# Patient Record
Sex: FEMALE | Race: WHITE | NOT HISPANIC OR LATINO | Employment: OTHER | URBAN - METROPOLITAN AREA
[De-identification: names, ages, dates, MRNs, and addresses within clinical notes are randomized per-mention and may not be internally consistent; named-entity substitution may affect disease eponyms.]

---

## 2017-01-03 ENCOUNTER — HISTORICAL (OUTPATIENT)
Dept: SURGERY | Facility: HOSPITAL | Age: 72
End: 2017-01-03

## 2017-01-06 ENCOUNTER — HISTORICAL (OUTPATIENT)
Dept: ANESTHESIOLOGY | Facility: HOSPITAL | Age: 72
End: 2017-01-06

## 2017-01-06 ENCOUNTER — HISTORICAL (OUTPATIENT)
Dept: ADMINISTRATIVE | Facility: HOSPITAL | Age: 72
End: 2017-01-06

## 2017-05-12 ENCOUNTER — HISTORICAL (OUTPATIENT)
Dept: INFUSION THERAPY | Facility: HOSPITAL | Age: 72
End: 2017-05-12

## 2017-05-12 LAB
ABS NEUT (OLG): 5.1 X10(3)/MCL (ref 1.5–6.9)
ALBUMIN SERPL-MCNC: 3.4 GM/DL (ref 3.4–5)
ALBUMIN/GLOB SERPL: 0.9 RATIO
ALP SERPL-CCNC: 137 UNIT/L (ref 30–113)
ALT SERPL-CCNC: 19 UNIT/L (ref 10–45)
AST SERPL-CCNC: 12 UNIT/L (ref 15–37)
BASOPHILS # BLD AUTO: 0.1 X10(3)/MCL (ref 0–0.1)
BASOPHILS NFR BLD AUTO: 1 % (ref 0–1)
BILIRUB SERPL-MCNC: 0.3 MG/DL (ref 0.1–0.9)
BILIRUBIN DIRECT+TOT PNL SERPL-MCNC: 0.1 MG/DL (ref 0–0.3)
BILIRUBIN DIRECT+TOT PNL SERPL-MCNC: 0.2 MG/DL
BUN SERPL-MCNC: 24 MG/DL (ref 10–20)
CALCIUM SERPL-MCNC: 8.6 MG/DL (ref 8–10.5)
CHLORIDE SERPL-SCNC: 103 MMOL/L (ref 100–108)
CO2 SERPL-SCNC: 27 MMOL/L (ref 21–35)
CREAT SERPL-MCNC: 1.65 MG/DL (ref 0.7–1.3)
EOSINOPHIL # BLD AUTO: 0.4 X10(3)/MCL (ref 0–0.6)
EOSINOPHIL NFR BLD AUTO: 5 % (ref 0–5)
ERYTHROCYTE [DISTWIDTH] IN BLOOD BY AUTOMATED COUNT: 15.1 % (ref 11.5–17)
GLOBULIN SER-MCNC: 3.7 GM/DL
GLUCOSE SERPL-MCNC: 144 MG/DL (ref 75–116)
HCT VFR BLD AUTO: 34 % (ref 36–48)
HGB BLD-MCNC: 10.7 GM/DL (ref 12–16)
LYMPHOCYTES # BLD AUTO: 2 X10(3)/MCL (ref 0.5–4.1)
LYMPHOCYTES NFR BLD AUTO: 24.8 % (ref 15–40)
MCH RBC QN AUTO: 28 PG (ref 27–34)
MCHC RBC AUTO-ENTMCNC: 32 GM/DL (ref 31–36)
MCV RBC AUTO: 89 FL (ref 80–99)
MONOCYTES # BLD AUTO: 0.5 X10(3)/MCL (ref 0–1.1)
MONOCYTES NFR BLD AUTO: 7 % (ref 4–12)
NEUTROPHILS # BLD AUTO: 5.1 X10(3)/MCL (ref 1.5–6.9)
NEUTROPHILS NFR BLD AUTO: 63 % (ref 43–75)
PLATELET # BLD AUTO: 260 X10(3)/MCL (ref 140–400)
PMV BLD AUTO: 10.3 FL (ref 6.8–10)
POTASSIUM SERPL-SCNC: 3.6 MMOL/L (ref 3.6–5.2)
PROT SERPL-MCNC: 7.1 GM/DL (ref 6.4–8.2)
RBC # BLD AUTO: 3.82 X10(6)/MCL (ref 4.2–5.4)
SODIUM SERPL-SCNC: 140 MMOL/L (ref 135–145)
WBC # SPEC AUTO: 8.2 X10(3)/MCL (ref 4.5–11.5)

## 2017-05-30 ENCOUNTER — HISTORICAL (OUTPATIENT)
Dept: RADIOLOGY | Facility: HOSPITAL | Age: 72
End: 2017-05-30

## 2017-06-13 ENCOUNTER — HISTORICAL (OUTPATIENT)
Dept: NEUROSURGERY | Facility: CLINIC | Age: 72
End: 2017-06-13

## 2017-06-13 ENCOUNTER — HISTORICAL (OUTPATIENT)
Dept: ADMINISTRATIVE | Facility: HOSPITAL | Age: 72
End: 2017-06-13

## 2017-06-13 LAB
ABS NEUT (OLG): 4.09 X10(3)/MCL (ref 2.1–9.2)
APTT PPP: 31.7 SECOND(S) (ref 20.6–36)
BASOPHILS # BLD AUTO: 0.1 X10(3)/MCL (ref 0–0.2)
BASOPHILS NFR BLD AUTO: 1 %
BUN SERPL-MCNC: 18 MG/DL (ref 7–18)
CALCIUM SERPL-MCNC: 8.8 MG/DL (ref 8.5–10.1)
CHLORIDE SERPL-SCNC: 107 MMOL/L (ref 98–107)
CO2 SERPL-SCNC: 26 MMOL/L (ref 21–32)
CREAT SERPL-MCNC: 1.48 MG/DL (ref 0.55–1.02)
EOSINOPHIL # BLD AUTO: 0.3 X10(3)/MCL (ref 0–0.9)
EOSINOPHIL NFR BLD AUTO: 4 %
ERYTHROCYTE [DISTWIDTH] IN BLOOD BY AUTOMATED COUNT: 15.8 % (ref 11.5–17)
GLUCOSE SERPL-MCNC: 107 MG/DL (ref 74–106)
HCT VFR BLD AUTO: 33.8 % (ref 37–47)
HGB BLD-MCNC: 10.4 GM/DL (ref 12–16)
INR PPP: 1.14 (ref 0–1.27)
LYMPHOCYTES # BLD AUTO: 1.5 X10(3)/MCL (ref 0.6–4.6)
LYMPHOCYTES NFR BLD AUTO: 23 %
MCH RBC QN AUTO: 27.8 PG (ref 27–31)
MCHC RBC AUTO-ENTMCNC: 30.8 GM/DL (ref 33–36)
MCV RBC AUTO: 90.4 FL (ref 80–94)
MONOCYTES # BLD AUTO: 0.4 X10(3)/MCL (ref 0.1–1.3)
MONOCYTES NFR BLD AUTO: 7 %
NEUTROPHILS # BLD AUTO: 4.09 X10(3)/MCL (ref 2.1–9.2)
NEUTROPHILS NFR BLD AUTO: 64 %
PLATELET # BLD AUTO: 226 X10(3)/MCL (ref 130–400)
PMV BLD AUTO: 10.5 FL (ref 9.4–12.4)
POTASSIUM SERPL-SCNC: 3.9 MMOL/L (ref 3.5–5.1)
PROTHROMBIN TIME: 14.4 SECOND(S) (ref 12.1–14.2)
RBC # BLD AUTO: 3.74 X10(6)/MCL (ref 4.2–5.4)
SODIUM SERPL-SCNC: 140 MMOL/L (ref 136–145)
WBC # SPEC AUTO: 6.4 X10(3)/MCL (ref 4.5–11.5)

## 2017-07-14 ENCOUNTER — HISTORICAL (OUTPATIENT)
Dept: INFUSION THERAPY | Facility: HOSPITAL | Age: 72
End: 2017-07-14

## 2017-08-08 ENCOUNTER — HISTORICAL (OUTPATIENT)
Dept: NEUROSURGERY | Facility: CLINIC | Age: 72
End: 2017-08-08

## 2017-08-08 ENCOUNTER — HISTORICAL (OUTPATIENT)
Dept: ADMINISTRATIVE | Facility: HOSPITAL | Age: 72
End: 2017-08-08

## 2017-08-08 LAB
ABS NEUT (OLG): 4.13 X10(3)/MCL (ref 2.1–9.2)
APTT PPP: 32.4 SECOND(S) (ref 20.6–36)
BASOPHILS # BLD AUTO: 0.1 X10(3)/MCL (ref 0–0.2)
BASOPHILS NFR BLD AUTO: 1 %
BUN SERPL-MCNC: 29 MG/DL (ref 7–18)
CALCIUM SERPL-MCNC: 8.7 MG/DL (ref 8.5–10.1)
CHLORIDE SERPL-SCNC: 107 MMOL/L (ref 98–107)
CO2 SERPL-SCNC: 29 MMOL/L (ref 21–32)
CREAT SERPL-MCNC: 1.63 MG/DL (ref 0.55–1.02)
EOSINOPHIL # BLD AUTO: 0.2 X10(3)/MCL (ref 0–0.9)
EOSINOPHIL NFR BLD AUTO: 3 %
ERYTHROCYTE [DISTWIDTH] IN BLOOD BY AUTOMATED COUNT: 15.5 % (ref 11.5–17)
GLUCOSE SERPL-MCNC: 108 MG/DL (ref 74–106)
HCT VFR BLD AUTO: 33.7 % (ref 37–47)
HGB BLD-MCNC: 10.4 GM/DL (ref 12–16)
INR PPP: 1.11 (ref 0–1.27)
LYMPHOCYTES # BLD AUTO: 2 X10(3)/MCL (ref 0.6–4.6)
LYMPHOCYTES NFR BLD AUTO: 28 %
MCH RBC QN AUTO: 27.6 PG (ref 27–31)
MCHC RBC AUTO-ENTMCNC: 30.9 GM/DL (ref 33–36)
MCV RBC AUTO: 89.4 FL (ref 80–94)
MONOCYTES # BLD AUTO: 0.5 X10(3)/MCL (ref 0.1–1.3)
MONOCYTES NFR BLD AUTO: 7 %
NEUTROPHILS # BLD AUTO: 4.13 X10(3)/MCL (ref 2.1–9.2)
NEUTROPHILS NFR BLD AUTO: 60 %
PLATELET # BLD AUTO: 233 X10(3)/MCL (ref 130–400)
PMV BLD AUTO: 9.9 FL (ref 9.4–12.4)
POTASSIUM SERPL-SCNC: 4.8 MMOL/L (ref 3.5–5.1)
PROTHROMBIN TIME: 14.1 SECOND(S) (ref 12.1–14.2)
RBC # BLD AUTO: 3.77 X10(6)/MCL (ref 4.2–5.4)
SODIUM SERPL-SCNC: 143 MMOL/L (ref 136–145)
WBC # SPEC AUTO: 6.8 X10(3)/MCL (ref 4.5–11.5)

## 2017-08-23 ENCOUNTER — HISTORICAL (OUTPATIENT)
Dept: SURGERY | Facility: HOSPITAL | Age: 72
End: 2017-08-23

## 2017-08-23 LAB
APTT PPP: 24.4 SECOND(S) (ref 25–35)
INR PPP: 1 (ref 0–1.2)
PROTHROMBIN TIME: 10.7 SECOND(S) (ref 9–12)

## 2017-08-25 ENCOUNTER — HISTORICAL (OUTPATIENT)
Dept: ANESTHESIOLOGY | Facility: HOSPITAL | Age: 72
End: 2017-08-25

## 2017-09-28 ENCOUNTER — HISTORICAL (OUTPATIENT)
Dept: HEMATOLOGY/ONCOLOGY | Facility: CLINIC | Age: 72
End: 2017-09-28

## 2017-09-28 ENCOUNTER — HISTORICAL (OUTPATIENT)
Dept: INFUSION THERAPY | Facility: HOSPITAL | Age: 72
End: 2017-09-28

## 2017-09-28 LAB
ABS NEUT (OLG): 6.5 X10(3)/MCL (ref 1.5–6.9)
ALBUMIN SERPL-MCNC: 3.5 GM/DL (ref 3.4–5)
ALBUMIN/GLOB SERPL: 0.9 RATIO
ALP SERPL-CCNC: 128 UNIT/L (ref 30–113)
ALT SERPL-CCNC: 20 UNIT/L (ref 10–45)
AST SERPL-CCNC: 12 UNIT/L (ref 15–37)
BASOPHILS # BLD AUTO: 0.1 X10(3)/MCL (ref 0–0.1)
BASOPHILS NFR BLD AUTO: 1 % (ref 0–1)
BILIRUB SERPL-MCNC: 0.3 MG/DL (ref 0.1–0.9)
BILIRUBIN DIRECT+TOT PNL SERPL-MCNC: 0.1 MG/DL (ref 0–0.3)
BILIRUBIN DIRECT+TOT PNL SERPL-MCNC: 0.2 MG/DL
BUN SERPL-MCNC: 21 MG/DL (ref 10–20)
CALCIUM SERPL-MCNC: 8.4 MG/DL (ref 8–10.5)
CHLORIDE SERPL-SCNC: 104 MMOL/L (ref 100–108)
CO2 SERPL-SCNC: 29 MMOL/L (ref 21–35)
CREAT SERPL-MCNC: 1.75 MG/DL (ref 0.7–1.3)
EOSINOPHIL # BLD AUTO: 0.3 X10(3)/MCL (ref 0–0.6)
EOSINOPHIL NFR BLD AUTO: 3 % (ref 0–5)
ERYTHROCYTE [DISTWIDTH] IN BLOOD BY AUTOMATED COUNT: 14.6 % (ref 11.5–17)
GLOBULIN SER-MCNC: 3.7 GM/DL
GLUCOSE SERPL-MCNC: 106 MG/DL (ref 75–116)
HCT VFR BLD AUTO: 35.2 % (ref 36–48)
HGB BLD-MCNC: 10.9 GM/DL (ref 12–16)
LDH SERPL-CCNC: 231 UNIT/L (ref 112–240)
LYMPHOCYTES # BLD AUTO: 2.2 X10(3)/MCL (ref 0.5–4.1)
LYMPHOCYTES NFR BLD AUTO: 22.4 % (ref 15–40)
MCH RBC QN AUTO: 28 PG (ref 27–34)
MCHC RBC AUTO-ENTMCNC: 31 GM/DL (ref 31–36)
MCV RBC AUTO: 90 FL (ref 80–99)
MONOCYTES # BLD AUTO: 0.8 X10(3)/MCL (ref 0–1.1)
MONOCYTES NFR BLD AUTO: 8 % (ref 4–12)
NEUTROPHILS # BLD AUTO: 6.5 X10(3)/MCL (ref 1.5–6.9)
NEUTROPHILS NFR BLD AUTO: 66 % (ref 43–75)
PLATELET # BLD AUTO: 292 X10(3)/MCL (ref 140–400)
PMV BLD AUTO: 10 FL (ref 6.8–10)
POTASSIUM SERPL-SCNC: 4 MMOL/L (ref 3.6–5.2)
PROT SERPL-MCNC: 7.2 GM/DL (ref 6.4–8.2)
RBC # BLD AUTO: 3.89 X10(6)/MCL (ref 4.2–5.4)
SODIUM SERPL-SCNC: 141 MMOL/L (ref 135–145)
WBC # SPEC AUTO: 9.8 X10(3)/MCL (ref 4.5–11.5)

## 2017-12-07 ENCOUNTER — HISTORICAL (OUTPATIENT)
Dept: NEUROSURGERY | Facility: CLINIC | Age: 72
End: 2017-12-07

## 2017-12-07 ENCOUNTER — HISTORICAL (OUTPATIENT)
Dept: ADMINISTRATIVE | Facility: HOSPITAL | Age: 72
End: 2017-12-07

## 2017-12-07 LAB
ABS NEUT (OLG): 3.94 X10(3)/MCL (ref 2.1–9.2)
APTT PPP: 30.9 SECOND(S) (ref 24.8–36.9)
BASOPHILS # BLD AUTO: 0.1 X10(3)/MCL (ref 0–0.2)
BASOPHILS NFR BLD AUTO: 1 %
BUN SERPL-MCNC: 23 MG/DL (ref 7–18)
CALCIUM SERPL-MCNC: 8.8 MG/DL (ref 8.5–10.1)
CHLORIDE SERPL-SCNC: 106 MMOL/L (ref 98–107)
CO2 SERPL-SCNC: 23 MMOL/L (ref 21–32)
CREAT SERPL-MCNC: 1.62 MG/DL (ref 0.55–1.02)
EOSINOPHIL # BLD AUTO: 0.2 X10(3)/MCL (ref 0–0.9)
EOSINOPHIL NFR BLD AUTO: 4 %
ERYTHROCYTE [DISTWIDTH] IN BLOOD BY AUTOMATED COUNT: 14.6 % (ref 11.5–17)
GLUCOSE SERPL-MCNC: 88 MG/DL (ref 74–106)
HCT VFR BLD AUTO: 34.9 % (ref 37–47)
HGB BLD-MCNC: 10.5 GM/DL (ref 12–16)
INR PPP: 1.09 (ref 0–1.27)
LYMPHOCYTES # BLD AUTO: 1.6 X10(3)/MCL (ref 0.6–4.6)
LYMPHOCYTES NFR BLD AUTO: 25 %
MCH RBC QN AUTO: 26.7 PG (ref 27–31)
MCHC RBC AUTO-ENTMCNC: 30.1 GM/DL (ref 33–36)
MCV RBC AUTO: 88.8 FL (ref 80–94)
MONOCYTES # BLD AUTO: 0.5 X10(3)/MCL (ref 0.1–1.3)
MONOCYTES NFR BLD AUTO: 7 %
NEUTROPHILS # BLD AUTO: 3.94 X10(3)/MCL (ref 1.4–7.9)
NEUTROPHILS NFR BLD AUTO: 62 %
PLATELET # BLD AUTO: 237 X10(3)/MCL (ref 130–400)
PMV BLD AUTO: 10.5 FL (ref 9.4–12.4)
POTASSIUM SERPL-SCNC: 3.8 MMOL/L (ref 3.5–5.1)
PROTHROMBIN TIME: 14.4 SECOND(S) (ref 12.2–14.7)
RBC # BLD AUTO: 3.93 X10(6)/MCL (ref 4.2–5.4)
SODIUM SERPL-SCNC: 142 MMOL/L (ref 136–145)
WBC # SPEC AUTO: 6.3 X10(3)/MCL (ref 4.5–11.5)

## 2018-05-17 ENCOUNTER — HISTORICAL (OUTPATIENT)
Dept: LAB | Facility: HOSPITAL | Age: 73
End: 2018-05-17

## 2018-05-17 LAB
ABS NEUT (OLG): 4.7 X10(3)/MCL (ref 1.5–6.9)
ALBUMIN SERPL-MCNC: 3.3 GM/DL (ref 3.4–5)
ALBUMIN/GLOB SERPL: 0.9 RATIO
ALP SERPL-CCNC: 140 UNIT/L (ref 30–113)
ALT SERPL-CCNC: 26 UNIT/L (ref 10–45)
AST SERPL-CCNC: 17 UNIT/L (ref 15–37)
BASOPHILS # BLD AUTO: 0.1 X10(3)/MCL (ref 0–0.1)
BASOPHILS NFR BLD AUTO: 1 % (ref 0–1)
BILIRUB SERPL-MCNC: 0.3 MG/DL (ref 0.1–0.9)
BILIRUBIN DIRECT+TOT PNL SERPL-MCNC: 0.1 MG/DL (ref 0–0.3)
BILIRUBIN DIRECT+TOT PNL SERPL-MCNC: 0.2 MG/DL
BUN SERPL-MCNC: 24 MG/DL (ref 10–20)
CALCIUM SERPL-MCNC: 8.8 MG/DL (ref 8–10.5)
CHLORIDE SERPL-SCNC: 107 MMOL/L (ref 100–108)
CO2 SERPL-SCNC: 28 MMOL/L (ref 21–35)
CREAT SERPL-MCNC: 1.54 MG/DL (ref 0.7–1.3)
EOSINOPHIL # BLD AUTO: 0.3 X10(3)/MCL (ref 0–0.6)
EOSINOPHIL NFR BLD AUTO: 5 % (ref 0–5)
ERYTHROCYTE [DISTWIDTH] IN BLOOD BY AUTOMATED COUNT: 15.6 % (ref 11.5–17)
GLOBULIN SER-MCNC: 3.5 GM/DL
GLUCOSE SERPL-MCNC: 109 MG/DL (ref 75–116)
HCT VFR BLD AUTO: 33.8 % (ref 36–48)
HGB BLD-MCNC: 10.3 GM/DL (ref 12–16)
LDH SERPL-CCNC: 197 UNIT/L (ref 112–240)
LYMPHOCYTES # BLD AUTO: 1.6 X10(3)/MCL (ref 0.5–4.1)
LYMPHOCYTES NFR BLD AUTO: 21.9 % (ref 15–40)
MCH RBC QN AUTO: 27 PG (ref 27–34)
MCHC RBC AUTO-ENTMCNC: 30 GM/DL (ref 31–36)
MCV RBC AUTO: 89 FL (ref 80–99)
MONOCYTES # BLD AUTO: 0.4 X10(3)/MCL (ref 0–1.1)
MONOCYTES NFR BLD AUTO: 6 % (ref 4–12)
NEUTROPHILS # BLD AUTO: 4.7 X10(3)/MCL (ref 1.5–6.9)
NEUTROPHILS NFR BLD AUTO: 66 % (ref 43–75)
PLATELET # BLD AUTO: 273 X10(3)/MCL (ref 140–400)
PMV BLD AUTO: 10.3 FL (ref 6.8–10)
POTASSIUM SERPL-SCNC: 4 MMOL/L (ref 3.6–5.2)
PROT SERPL-MCNC: 6.8 GM/DL (ref 6.4–8.2)
RBC # BLD AUTO: 3.81 X10(6)/MCL (ref 4.2–5.4)
SODIUM SERPL-SCNC: 143 MMOL/L (ref 135–145)
WBC # SPEC AUTO: 7.1 X10(3)/MCL (ref 4.5–11.5)

## 2018-06-04 ENCOUNTER — HISTORICAL (OUTPATIENT)
Dept: SURGERY | Facility: HOSPITAL | Age: 73
End: 2018-06-04

## 2018-06-04 LAB
APTT PPP: 25.7 SECOND(S) (ref 25–35)
INR PPP: 1 (ref 0–1.2)
PROTHROMBIN TIME: 10.7 SECOND(S) (ref 9–12)

## 2018-06-08 ENCOUNTER — HISTORICAL (OUTPATIENT)
Dept: ANESTHESIOLOGY | Facility: HOSPITAL | Age: 73
End: 2018-06-08

## 2018-06-08 LAB — CRC RECOMMENDATION EXT: NORMAL

## 2018-06-18 ENCOUNTER — HISTORICAL (OUTPATIENT)
Dept: RADIOLOGY | Facility: HOSPITAL | Age: 73
End: 2018-06-18

## 2018-07-11 ENCOUNTER — HISTORICAL (OUTPATIENT)
Dept: CARDIOLOGY | Facility: HOSPITAL | Age: 73
End: 2018-07-11

## 2018-07-11 LAB
ABS NEUT (OLG): 4.01 X10(3)/MCL (ref 2.1–9.2)
APTT PPP: 33.1 SECOND(S) (ref 24.8–36.9)
BASOPHILS # BLD AUTO: 0.1 X10(3)/MCL (ref 0–0.2)
BASOPHILS NFR BLD AUTO: 1 %
EOSINOPHIL # BLD AUTO: 0.3 X10(3)/MCL (ref 0–0.9)
EOSINOPHIL NFR BLD AUTO: 5 %
ERYTHROCYTE [DISTWIDTH] IN BLOOD BY AUTOMATED COUNT: 15.5 % (ref 11.5–17)
HCT VFR BLD AUTO: 32.1 % (ref 37–47)
HGB BLD-MCNC: 9.9 GM/DL (ref 12–16)
INR PPP: 1.09 (ref 0–1.27)
LYMPHOCYTES # BLD AUTO: 1.7 X10(3)/MCL (ref 0.6–4.6)
LYMPHOCYTES NFR BLD AUTO: 26 %
MCH RBC QN AUTO: 27.8 PG (ref 27–31)
MCHC RBC AUTO-ENTMCNC: 30.8 GM/DL (ref 33–36)
MCV RBC AUTO: 90.2 FL (ref 80–94)
MONOCYTES # BLD AUTO: 0.6 X10(3)/MCL (ref 0.1–1.3)
MONOCYTES NFR BLD AUTO: 8 %
NEUTROPHILS # BLD AUTO: 4.01 X10(3)/MCL (ref 1.4–7.9)
NEUTROPHILS NFR BLD AUTO: 60 %
PLATELET # BLD AUTO: 259 X10(3)/MCL (ref 130–400)
PMV BLD AUTO: 10.4 FL (ref 9.4–12.4)
PROTHROMBIN TIME: 14.4 SECOND(S) (ref 12.2–14.7)
RBC # BLD AUTO: 3.56 X10(6)/MCL (ref 4.2–5.4)
WBC # SPEC AUTO: 6.7 X10(3)/MCL (ref 4.5–11.5)

## 2018-09-26 ENCOUNTER — HISTORICAL (OUTPATIENT)
Dept: RESPIRATORY THERAPY | Facility: HOSPITAL | Age: 73
End: 2018-09-26

## 2018-10-22 ENCOUNTER — HISTORICAL (OUTPATIENT)
Dept: LAB | Facility: HOSPITAL | Age: 73
End: 2018-10-22

## 2018-10-22 LAB
ABS NEUT (OLG): 4.1 X10(3)/MCL (ref 1.5–6.9)
ALBUMIN SERPL-MCNC: 3.4 GM/DL (ref 3.4–5)
ALBUMIN/GLOB SERPL: 0.9 RATIO
ALP SERPL-CCNC: 126 UNIT/L (ref 30–113)
ALT SERPL-CCNC: 23 UNIT/L (ref 10–45)
ANISOCYTOSIS BLD QL SMEAR: ABNORMAL
AST SERPL-CCNC: 15 UNIT/L (ref 15–37)
BASOPHILS # BLD AUTO: 0.1 X10(3)/MCL (ref 0–0.1)
BASOPHILS NFR BLD AUTO: 1 % (ref 0–1)
BILIRUB SERPL-MCNC: 0.4 MG/DL (ref 0.1–0.9)
BILIRUBIN DIRECT+TOT PNL SERPL-MCNC: 0.1 MG/DL (ref 0–0.3)
BILIRUBIN DIRECT+TOT PNL SERPL-MCNC: 0.3 MG/DL
BUN SERPL-MCNC: 37 MG/DL (ref 10–20)
CALCIUM SERPL-MCNC: 8.7 MG/DL (ref 8–10.5)
CHLORIDE SERPL-SCNC: 104 MMOL/L (ref 100–108)
CO2 SERPL-SCNC: 32 MMOL/L (ref 21–35)
CREAT SERPL-MCNC: 1.83 MG/DL (ref 0.7–1.3)
CREAT UR-MCNC: 17 MG/DL
EOSINOPHIL # BLD AUTO: 0.3 X10(3)/MCL (ref 0–0.6)
EOSINOPHIL NFR BLD AUTO: 4 % (ref 0–5)
ERYTHROCYTE [DISTWIDTH] IN BLOOD BY AUTOMATED COUNT: 15.8 % (ref 11.5–17)
FERRITIN SERPL-MCNC: 13 NG/ML (ref 3–252)
GLOBULIN SER-MCNC: 3.6 GM/DL
GLUCOSE SERPL-MCNC: 126 MG/DL (ref 75–116)
HCT VFR BLD AUTO: 37.2 % (ref 36–48)
HGB BLD-MCNC: 11.1 GM/DL (ref 12–16)
HYPOCHROMIA BLD QL SMEAR: ABNORMAL
IMM GRANULOCYTES # BLD AUTO: 0.01 10*3/UL (ref 0–0.02)
IMM GRANULOCYTES NFR BLD AUTO: 0.1 % (ref 0–0.43)
IRON SATN MFR SERPL: 12 % (ref 15–55)
IRON SERPL-MCNC: 44 MCG/DL (ref 50–170)
LDH SERPL-CCNC: 198 UNIT/L (ref 112–240)
LYMPHOCYTES # BLD AUTO: 2 X10(3)/MCL (ref 0.5–4.1)
LYMPHOCYTES NFR BLD AUTO: 28 % (ref 15–40)
MCH RBC QN AUTO: 26 PG (ref 27–34)
MCHC RBC AUTO-ENTMCNC: 30 GM/DL (ref 31–36)
MCV RBC AUTO: 89 FL (ref 80–99)
MONOCYTES # BLD AUTO: 0.5 X10(3)/MCL (ref 0–1.1)
MONOCYTES NFR BLD AUTO: 8 % (ref 4–12)
NEUTROPHILS # BLD AUTO: 4.1 X10(3)/MCL (ref 1.5–6.9)
NEUTROPHILS NFR BLD AUTO: 59 % (ref 43–75)
PHOSPHATE SERPL-MCNC: 4.1 MG/DL (ref 2.6–4.7)
PLATELET # BLD AUTO: 312 X10(3)/MCL (ref 140–400)
PLATELET # BLD EST: ADEQUATE 10*3/UL
PMV BLD AUTO: 10.2 FL (ref 6.8–10)
POTASSIUM SERPL-SCNC: 4.3 MMOL/L (ref 3.6–5.2)
PROT SERPL-MCNC: 7 GM/DL (ref 6.4–8.2)
PROT UR STRIP-MCNC: 10 MG/DL (ref 0–10)
PTH-INTACT SERPL-MCNC: 243.4 PG/ML (ref 18.4–80.1)
RBC # BLD AUTO: 4.2 X10(6)/MCL (ref 4.2–5.4)
RBC MORPH BLD: ABNORMAL
SODIUM SERPL-SCNC: 143 MMOL/L (ref 135–145)
TIBC SERPL-MCNC: 330 MCG/DL (ref 150–375)
TIBC SERPL-MCNC: 374 MCG/DL (ref 250–450)
WBC # SPEC AUTO: 7 X10(3)/MCL (ref 4.5–11.5)

## 2018-11-30 ENCOUNTER — HISTORICAL (OUTPATIENT)
Dept: RADIOLOGY | Facility: HOSPITAL | Age: 73
End: 2018-11-30

## 2018-12-07 ENCOUNTER — HISTORICAL (OUTPATIENT)
Dept: RADIOLOGY | Facility: HOSPITAL | Age: 73
End: 2018-12-07

## 2019-04-23 ENCOUNTER — HISTORICAL (OUTPATIENT)
Dept: LAB | Facility: HOSPITAL | Age: 74
End: 2019-04-23

## 2019-04-23 LAB
ABS NEUT (OLG): 5.8 X10(3)/MCL (ref 1.5–6.9)
ALBUMIN SERPL-MCNC: 3.7 GM/DL (ref 3.4–5)
ALBUMIN/GLOB SERPL: 1 RATIO
ALP SERPL-CCNC: 124 UNIT/L (ref 30–113)
ALT SERPL-CCNC: 21 UNIT/L (ref 10–45)
AST SERPL-CCNC: 16 UNIT/L (ref 15–37)
BASOPHILS # BLD AUTO: 0.1 X10(3)/MCL (ref 0–0.1)
BASOPHILS NFR BLD AUTO: 1 % (ref 0–1)
BILIRUB SERPL-MCNC: 0.3 MG/DL (ref 0.1–0.9)
BILIRUBIN DIRECT+TOT PNL SERPL-MCNC: 0.1 MG/DL (ref 0–0.3)
BILIRUBIN DIRECT+TOT PNL SERPL-MCNC: 0.2 MG/DL
BUN SERPL-MCNC: 60 MG/DL (ref 10–20)
CALCIUM SERPL-MCNC: 9.2 MG/DL (ref 8–10.5)
CHLORIDE SERPL-SCNC: 100 MMOL/L (ref 100–108)
CO2 SERPL-SCNC: 33 MMOL/L (ref 21–35)
CREAT SERPL-MCNC: 1.86 MG/DL (ref 0.7–1.3)
EOSINOPHIL # BLD AUTO: 0.6 X10(3)/MCL (ref 0–0.6)
EOSINOPHIL NFR BLD AUTO: 6 % (ref 0–5)
ERYTHROCYTE [DISTWIDTH] IN BLOOD BY AUTOMATED COUNT: 15.9 % (ref 11.5–17)
GLOBULIN SER-MCNC: 3.8 GM/DL
GLUCOSE SERPL-MCNC: 110 MG/DL (ref 75–116)
HCT VFR BLD AUTO: 38.8 % (ref 36–48)
HGB BLD-MCNC: 11.7 GM/DL (ref 12–16)
IMM GRANULOCYTES # BLD AUTO: 0.03 10*3/UL (ref 0–0.02)
IMM GRANULOCYTES NFR BLD AUTO: 0.3 % (ref 0–0.43)
LDH SERPL-CCNC: 237 UNIT/L (ref 112–240)
LYMPHOCYTES # BLD AUTO: 2.8 X10(3)/MCL (ref 0.5–4.1)
LYMPHOCYTES NFR BLD AUTO: 27 % (ref 15–40)
MCH RBC QN AUTO: 28 PG (ref 27–34)
MCHC RBC AUTO-ENTMCNC: 30 GM/DL (ref 31–36)
MCV RBC AUTO: 91 FL (ref 80–99)
MONOCYTES # BLD AUTO: 0.8 X10(3)/MCL (ref 0–1.1)
MONOCYTES NFR BLD AUTO: 8 % (ref 4–12)
NEUTROPHILS # BLD AUTO: 5.8 X10(3)/MCL (ref 1.5–6.9)
NEUTROPHILS NFR BLD AUTO: 57 % (ref 43–75)
PLATELET # BLD AUTO: 306 X10(3)/MCL (ref 140–400)
PMV BLD AUTO: 10.2 FL (ref 6.8–10)
POTASSIUM SERPL-SCNC: 4 MMOL/L (ref 3.6–5.2)
PROT SERPL-MCNC: 7.5 GM/DL (ref 6.4–8.2)
RBC # BLD AUTO: 4.25 X10(6)/MCL (ref 4.2–5.4)
SODIUM SERPL-SCNC: 141 MMOL/L (ref 135–145)
WBC # SPEC AUTO: 10.1 X10(3)/MCL (ref 4.5–11.5)

## 2019-07-26 ENCOUNTER — HISTORICAL (OUTPATIENT)
Dept: RADIOLOGY | Facility: HOSPITAL | Age: 74
End: 2019-07-26

## 2019-07-26 LAB
ABS NEUT (OLG): 5.93 X10(3)/MCL (ref 2.1–9.2)
ALBUMIN SERPL-MCNC: 3.8 GM/DL (ref 3.4–5)
ALBUMIN/GLOB SERPL: 1.2 {RATIO}
ALP SERPL-CCNC: 133 UNIT/L (ref 38–126)
ALT SERPL-CCNC: 20 UNIT/L (ref 12–78)
AST SERPL-CCNC: 16 UNIT/L (ref 15–37)
BASOPHILS # BLD AUTO: 0.1 X10(3)/MCL (ref 0–0.2)
BASOPHILS NFR BLD AUTO: 1 %
BILIRUB SERPL-MCNC: 0.4 MG/DL (ref 0.2–1)
BILIRUBIN DIRECT+TOT PNL SERPL-MCNC: 0.1 MG/DL (ref 0–0.2)
BILIRUBIN DIRECT+TOT PNL SERPL-MCNC: 0.3 MG/DL (ref 0–0.8)
BUN SERPL-MCNC: 61 MG/DL (ref 7–18)
CALCIUM SERPL-MCNC: 9.4 MG/DL (ref 8.5–10.1)
CHLORIDE SERPL-SCNC: 97 MMOL/L (ref 98–107)
CO2 SERPL-SCNC: 34 MMOL/L (ref 21–32)
CREAT SERPL-MCNC: 2.43 MG/DL (ref 0.55–1.02)
EOSINOPHIL # BLD AUTO: 0.3 X10(3)/MCL (ref 0–0.9)
EOSINOPHIL NFR BLD AUTO: 4 %
ERYTHROCYTE [DISTWIDTH] IN BLOOD BY AUTOMATED COUNT: 15.1 % (ref 11.5–17)
GLOBULIN SER-MCNC: 3.1 GM/DL (ref 2.4–3.5)
GLUCOSE SERPL-MCNC: 104 MG/DL (ref 74–106)
HCT VFR BLD AUTO: 37.2 % (ref 37–47)
HGB BLD-MCNC: 11.2 GM/DL (ref 12–16)
LDH SERPL-CCNC: 232 UNIT/L (ref 84–246)
LYMPHOCYTES # BLD AUTO: 2 X10(3)/MCL (ref 0.6–4.6)
LYMPHOCYTES NFR BLD AUTO: 22 %
MCH RBC QN AUTO: 26.7 PG (ref 27–31)
MCHC RBC AUTO-ENTMCNC: 30.1 GM/DL (ref 33–36)
MCV RBC AUTO: 88.6 FL (ref 80–94)
MONOCYTES # BLD AUTO: 0.7 X10(3)/MCL (ref 0.1–1.3)
MONOCYTES NFR BLD AUTO: 8 %
NEUTROPHILS # BLD AUTO: 5.93 X10(3)/MCL (ref 2.1–9.2)
NEUTROPHILS NFR BLD AUTO: 65 %
PLATELET # BLD AUTO: 306 X10(3)/MCL (ref 130–400)
PMV BLD AUTO: 10.5 FL (ref 9.4–12.4)
POTASSIUM SERPL-SCNC: 3.6 MMOL/L (ref 3.5–5.1)
PROT SERPL-MCNC: 6.9 GM/DL (ref 6.4–8.2)
RBC # BLD AUTO: 4.2 X10(6)/MCL (ref 4.2–5.4)
SODIUM SERPL-SCNC: 140 MMOL/L (ref 136–145)
WBC # SPEC AUTO: 9.1 X10(3)/MCL (ref 4.5–11.5)

## 2020-01-06 ENCOUNTER — HISTORICAL (OUTPATIENT)
Dept: RADIOLOGY | Facility: HOSPITAL | Age: 75
End: 2020-01-06

## 2020-01-06 LAB
ABS NEUT (OLG): 7.12 X10(3)/MCL (ref 2.1–9.2)
ALBUMIN SERPL-MCNC: 3.7 GM/DL (ref 3.4–5)
ALBUMIN/GLOB SERPL: 1.1 {RATIO}
ALP SERPL-CCNC: 116 UNIT/L (ref 38–126)
ALT SERPL-CCNC: 23 UNIT/L (ref 12–78)
AST SERPL-CCNC: 15 UNIT/L (ref 15–37)
BASOPHILS # BLD AUTO: 0.1 X10(3)/MCL (ref 0–0.2)
BASOPHILS NFR BLD AUTO: 1 %
BILIRUB SERPL-MCNC: 0.7 MG/DL (ref 0.2–1)
BILIRUBIN DIRECT+TOT PNL SERPL-MCNC: 0.1 MG/DL (ref 0–0.2)
BILIRUBIN DIRECT+TOT PNL SERPL-MCNC: 0.6 MG/DL (ref 0–0.8)
BUN SERPL-MCNC: 74 MG/DL (ref 7–18)
CALCIUM SERPL-MCNC: 9.4 MG/DL (ref 8.5–10.1)
CHLORIDE SERPL-SCNC: 100 MMOL/L (ref 98–107)
CO2 SERPL-SCNC: 30 MMOL/L (ref 21–32)
CREAT SERPL-MCNC: 2.36 MG/DL (ref 0.55–1.02)
EOSINOPHIL # BLD AUTO: 0.3 X10(3)/MCL (ref 0–0.9)
EOSINOPHIL NFR BLD AUTO: 3 %
ERYTHROCYTE [DISTWIDTH] IN BLOOD BY AUTOMATED COUNT: 15.2 % (ref 11.5–17)
GLOBULIN SER-MCNC: 3.4 GM/DL (ref 2.4–3.5)
GLUCOSE SERPL-MCNC: 92 MG/DL (ref 74–106)
HCT VFR BLD AUTO: 40.6 % (ref 37–47)
HGB BLD-MCNC: 12.1 GM/DL (ref 12–16)
LDH SERPL-CCNC: 235 UNIT/L (ref 84–246)
LYMPHOCYTES # BLD AUTO: 2.2 X10(3)/MCL (ref 0.6–4.6)
LYMPHOCYTES NFR BLD AUTO: 22 %
MCH RBC QN AUTO: 28 PG (ref 27–31)
MCHC RBC AUTO-ENTMCNC: 29.8 GM/DL (ref 33–36)
MCV RBC AUTO: 94 FL (ref 80–94)
MONOCYTES # BLD AUTO: 0.6 X10(3)/MCL (ref 0.1–1.3)
MONOCYTES NFR BLD AUTO: 6 %
NEUTROPHILS # BLD AUTO: 7.12 X10(3)/MCL (ref 2.1–9.2)
NEUTROPHILS NFR BLD AUTO: 68 %
PLATELET # BLD AUTO: 299 X10(3)/MCL (ref 130–400)
PMV BLD AUTO: 10.2 FL (ref 9.4–12.4)
POTASSIUM SERPL-SCNC: 3.8 MMOL/L (ref 3.5–5.1)
PROT SERPL-MCNC: 7.1 GM/DL (ref 6.4–8.2)
RBC # BLD AUTO: 4.32 X10(6)/MCL (ref 4.2–5.4)
SODIUM SERPL-SCNC: 141 MMOL/L (ref 136–145)
WBC # SPEC AUTO: 10.4 X10(3)/MCL (ref 4.5–11.5)

## 2020-05-13 ENCOUNTER — HISTORICAL (OUTPATIENT)
Dept: RADIOLOGY | Facility: HOSPITAL | Age: 75
End: 2020-05-13

## 2020-07-22 ENCOUNTER — HISTORICAL (OUTPATIENT)
Dept: LAB | Facility: HOSPITAL | Age: 75
End: 2020-07-22

## 2020-07-22 LAB
ABS NEUT (OLG): 8.6 X10(3)/MCL (ref 1.5–6.9)
ALBUMIN SERPL-MCNC: 3.2 GM/DL (ref 3.4–4.8)
ALBUMIN/GLOB SERPL: 0.9 RATIO (ref 1.1–2)
ALP SERPL-CCNC: 87 UNIT/L (ref 40–150)
ALT SERPL-CCNC: 24 UNIT/L (ref 0–55)
AST SERPL-CCNC: 21 UNIT/L (ref 5–34)
BASOPHILS # BLD AUTO: 0.1 X10(3)/MCL (ref 0–0.1)
BASOPHILS NFR BLD AUTO: 1 % (ref 0–1)
BILIRUB SERPL-MCNC: 0.2 MG/DL
BILIRUBIN DIRECT+TOT PNL SERPL-MCNC: 0.1 MG/DL (ref 0–0.5)
BILIRUBIN DIRECT+TOT PNL SERPL-MCNC: 0.1 MG/DL (ref 0–0.8)
BUN SERPL-MCNC: 113 MG/DL (ref 9.8–20.1)
CALCIUM SERPL-MCNC: 9.5 MG/DL (ref 8.4–10.2)
CHLORIDE SERPL-SCNC: 99 MMOL/L (ref 98–107)
CO2 SERPL-SCNC: 30 MMOL/L (ref 23–31)
CREAT SERPL-MCNC: 3.75 MG/DL (ref 0.55–1.02)
EOSINOPHIL # BLD AUTO: 0.4 X10(3)/MCL (ref 0–0.6)
EOSINOPHIL NFR BLD AUTO: 3 % (ref 0–5)
ERYTHROCYTE [DISTWIDTH] IN BLOOD BY AUTOMATED COUNT: 14.9 % (ref 11.5–17)
GLOBULIN SER-MCNC: 3.4 GM/DL (ref 2.4–3.5)
GLUCOSE SERPL-MCNC: 168 MG/DL (ref 82–115)
HCT VFR BLD AUTO: 27.4 % (ref 36–48)
HGB BLD-MCNC: 8.4 GM/DL (ref 12–16)
IMM GRANULOCYTES # BLD AUTO: 0.06 10*3/UL (ref 0–0.02)
IMM GRANULOCYTES NFR BLD AUTO: 0.5 % (ref 0–0.43)
LDH SERPL-CCNC: 271 UNIT/L (ref 140–271)
LYMPHOCYTES # BLD AUTO: 1.9 X10(3)/MCL (ref 0.5–4.1)
LYMPHOCYTES NFR BLD AUTO: 16 % (ref 15–40)
MCH RBC QN AUTO: 28 PG (ref 27–34)
MCHC RBC AUTO-ENTMCNC: 31 GM/DL (ref 31–36)
MCV RBC AUTO: 91 FL (ref 80–99)
MONOCYTES # BLD AUTO: 0.8 X10(3)/MCL (ref 0–1.1)
MONOCYTES NFR BLD AUTO: 7 % (ref 4–12)
NEUTROPHILS # BLD AUTO: 8.6 X10(3)/MCL (ref 1.5–6.9)
NEUTROPHILS NFR BLD AUTO: 73 % (ref 43–75)
PLATELET # BLD AUTO: 270 X10(3)/MCL (ref 140–400)
PMV BLD AUTO: 10.7 FL (ref 6.8–10)
POTASSIUM SERPL-SCNC: 4.2 MMOL/L (ref 3.5–5.1)
PROT SERPL-MCNC: 6.6 GM/DL (ref 5.8–7.6)
RBC # BLD AUTO: 3 X10(6)/MCL (ref 4.2–5.4)
SODIUM SERPL-SCNC: 143 MMOL/L (ref 136–145)
WBC # SPEC AUTO: 11.8 X10(3)/MCL (ref 4.5–11.5)

## 2020-09-29 ENCOUNTER — HISTORICAL (OUTPATIENT)
Dept: RADIOLOGY | Facility: HOSPITAL | Age: 75
End: 2020-09-29

## 2020-10-19 ENCOUNTER — HISTORICAL (OUTPATIENT)
Dept: RADIOLOGY | Facility: HOSPITAL | Age: 75
End: 2020-10-19

## 2020-11-22 ENCOUNTER — HISTORICAL (OUTPATIENT)
Dept: ADMINISTRATIVE | Facility: HOSPITAL | Age: 75
End: 2020-11-22

## 2020-11-22 LAB
BUN SERPL-MCNC: 71 MG/DL (ref 9.8–20.1)
CALCIUM SERPL-MCNC: 8.7 MG/DL (ref 8.4–10.2)
CHLORIDE SERPL-SCNC: 103 MMOL/L (ref 98–107)
CO2 SERPL-SCNC: 27 MMOL/L (ref 23–31)
CREAT SERPL-MCNC: 2.04 MG/DL (ref 0.55–1.02)
CREAT/UREA NIT SERPL: 35
GLUCOSE SERPL-MCNC: 125 MG/DL (ref 82–115)
POTASSIUM SERPL-SCNC: 3.9 MMOL/L (ref 3.5–5.1)
SODIUM SERPL-SCNC: 145 MMOL/L (ref 136–145)

## 2021-01-07 ENCOUNTER — HOSPITAL ENCOUNTER (OUTPATIENT)
Dept: MEDSURG UNIT | Facility: HOSPITAL | Age: 76
End: 2021-01-12
Attending: FAMILY MEDICINE | Admitting: FAMILY MEDICINE

## 2021-01-07 LAB
ABS NEUT (OLG): 6.3 X10(3)/MCL (ref 1.5–6.9)
ALBUMIN SERPL-MCNC: 3.5 GM/DL (ref 3.4–4.8)
ALBUMIN/GLOB SERPL: 1.1 RATIO (ref 1.1–2)
ALP SERPL-CCNC: 114 UNIT/L (ref 40–150)
ALT SERPL-CCNC: 17 UNIT/L (ref 0–55)
APPEARANCE, UA: CLEAR
AST SERPL-CCNC: 14 UNIT/L (ref 5–34)
BASOPHILS # BLD AUTO: 0 X10(3)/MCL (ref 0–0.1)
BASOPHILS NFR BLD AUTO: 0 % (ref 0–1)
BILIRUB SERPL-MCNC: 0.4 MG/DL
BILIRUB UR QL STRIP: NEGATIVE
BILIRUBIN DIRECT+TOT PNL SERPL-MCNC: 0.2 MG/DL (ref 0–0.5)
BILIRUBIN DIRECT+TOT PNL SERPL-MCNC: 0.2 MG/DL (ref 0–0.8)
BUN SERPL-MCNC: 134 MG/DL (ref 9.8–20.1)
CALCIUM SERPL-MCNC: 10.3 MG/DL (ref 8.4–10.2)
CHLORIDE SERPL-SCNC: 100 MMOL/L (ref 98–107)
CO2 SERPL-SCNC: 28 MMOL/L (ref 23–31)
COLOR UR: YELLOW
CREAT SERPL-MCNC: 3.38 MG/DL (ref 0.55–1.02)
EOSINOPHIL # BLD AUTO: 0.3 X10(3)/MCL (ref 0–0.6)
EOSINOPHIL NFR BLD AUTO: 3 % (ref 0–5)
ERYTHROCYTE [DISTWIDTH] IN BLOOD BY AUTOMATED COUNT: 18.9 % (ref 11.5–17)
GLOBULIN SER-MCNC: 3.2 GM/DL (ref 2.4–3.5)
GLUCOSE (UA): NEGATIVE MG/DL
GLUCOSE SERPL-MCNC: 103 MG/DL (ref 82–115)
HCT VFR BLD AUTO: 31.7 % (ref 36–48)
HGB BLD-MCNC: 9.5 GM/DL (ref 12–16)
HGB UR QL STRIP: NEGATIVE
IMM GRANULOCYTES # BLD AUTO: 0.02 10*3/UL (ref 0–0.02)
IMM GRANULOCYTES NFR BLD AUTO: 0.2 % (ref 0–0.43)
KETONES UR QL STRIP: NEGATIVE MG/DL
LEUKOCYTE ESTERASE UR QL STRIP: NEGATIVE
LYMPHOCYTES # BLD AUTO: 1.6 X10(3)/MCL (ref 0.5–4.1)
LYMPHOCYTES NFR BLD AUTO: 18 % (ref 15–40)
MCH RBC QN AUTO: 24 PG (ref 27–34)
MCHC RBC AUTO-ENTMCNC: 30 GM/DL (ref 31–36)
MCV RBC AUTO: 79 FL (ref 80–99)
MONOCYTES # BLD AUTO: 0.7 X10(3)/MCL (ref 0–1.1)
MONOCYTES NFR BLD AUTO: 8 % (ref 4–12)
NEUTROPHILS # BLD AUTO: 6.3 X10(3)/MCL (ref 1.5–6.9)
NEUTROPHILS NFR BLD AUTO: 70 % (ref 43–75)
NITRITE UR QL STRIP: NEGATIVE
PH UR STRIP: 6 [PH] (ref 4.6–8)
PLATELET # BLD AUTO: 299 X10(3)/MCL (ref 140–400)
PMV BLD AUTO: 9.2 FL (ref 6.8–10)
POTASSIUM SERPL-SCNC: 4.3 MMOL/L (ref 3.5–5.1)
PROT SERPL-MCNC: 6.7 GM/DL (ref 5.8–7.6)
PROT UR QL STRIP: NEGATIVE MG/DL
RBC # BLD AUTO: 3.99 X10(6)/MCL (ref 4.2–5.4)
SODIUM SERPL-SCNC: 141 MMOL/L (ref 136–145)
SP GR UR STRIP: 1.01 (ref 1–1.03)
UROBILINOGEN UR STRIP-ACNC: 0.2 EU/DL
WBC # SPEC AUTO: 9 X10(3)/MCL (ref 4.5–11.5)

## 2021-01-08 LAB
ALBUMIN SERPL-MCNC: 3.3 GM/DL (ref 3.4–4.8)
BUN SERPL-MCNC: 118 MG/DL (ref 9.8–20.1)
CALCIUM SERPL-MCNC: 9.8 MG/DL (ref 8.4–10.2)
CHLORIDE SERPL-SCNC: 101 MMOL/L (ref 98–107)
CO2 SERPL-SCNC: 30 MMOL/L (ref 23–31)
CREAT SERPL-MCNC: 3.37 MG/DL (ref 0.55–1.02)
GLUCOSE SERPL-MCNC: 154 MG/DL (ref 82–115)
PHOSPHATE SERPL-MCNC: 4.8 MG/DL (ref 2.3–4.7)
POTASSIUM SERPL-SCNC: 3.9 MMOL/L (ref 3.5–5.1)
SODIUM SERPL-SCNC: 142 MMOL/L (ref 136–145)

## 2021-01-09 LAB
ABS NEUT (OLG): 7.4 X10(3)/MCL (ref 1.5–6.9)
ALBUMIN SERPL-MCNC: 3.5 GM/DL (ref 3.4–4.8)
ALBUMIN/GLOB SERPL: 1.2 RATIO (ref 1.1–2)
ALP SERPL-CCNC: 102 UNIT/L (ref 40–150)
ALT SERPL-CCNC: 15 UNIT/L (ref 0–55)
AST SERPL-CCNC: 13 UNIT/L (ref 5–34)
BASOPHILS # BLD AUTO: 0 X10(3)/MCL (ref 0–0.1)
BASOPHILS NFR BLD AUTO: 0 % (ref 0–1)
BILIRUB SERPL-MCNC: 0.5 MG/DL
BILIRUBIN DIRECT+TOT PNL SERPL-MCNC: 0.2 MG/DL (ref 0–0.8)
BILIRUBIN DIRECT+TOT PNL SERPL-MCNC: 0.3 MG/DL (ref 0–0.5)
BUN SERPL-MCNC: 108 MG/DL (ref 9.8–20.1)
CALCIUM SERPL-MCNC: 9.5 MG/DL (ref 8.4–10.2)
CHLORIDE SERPL-SCNC: 97 MMOL/L (ref 98–107)
CO2 SERPL-SCNC: 29 MMOL/L (ref 23–31)
CREAT SERPL-MCNC: 2.78 MG/DL (ref 0.55–1.02)
EOSINOPHIL # BLD AUTO: 0.4 X10(3)/MCL (ref 0–0.6)
EOSINOPHIL NFR BLD AUTO: 3 % (ref 0–5)
ERYTHROCYTE [DISTWIDTH] IN BLOOD BY AUTOMATED COUNT: 19.1 % (ref 11.5–17)
GLOBULIN SER-MCNC: 2.9 GM/DL (ref 2.4–3.5)
GLUCOSE SERPL-MCNC: 145 MG/DL (ref 82–115)
HCT VFR BLD AUTO: 29.4 % (ref 36–48)
HGB BLD-MCNC: 9 GM/DL (ref 12–16)
IMM GRANULOCYTES # BLD AUTO: 0.01 10*3/UL (ref 0–0.02)
IMM GRANULOCYTES NFR BLD AUTO: 0.1 % (ref 0–0.43)
LYMPHOCYTES # BLD AUTO: 1.7 X10(3)/MCL (ref 0.5–4.1)
LYMPHOCYTES NFR BLD AUTO: 17 % (ref 15–40)
MAGNESIUM SERPL-MCNC: 2.1 MG/DL (ref 1.6–2.6)
MCH RBC QN AUTO: 24 PG (ref 27–34)
MCHC RBC AUTO-ENTMCNC: 31 GM/DL (ref 31–36)
MCV RBC AUTO: 77 FL (ref 80–99)
MONOCYTES # BLD AUTO: 0.9 X10(3)/MCL (ref 0–1.1)
MONOCYTES NFR BLD AUTO: 8 % (ref 4–12)
NEUTROPHILS # BLD AUTO: 7.4 X10(3)/MCL (ref 1.5–6.9)
NEUTROPHILS NFR BLD AUTO: 71 % (ref 43–75)
PHOSPHATE SERPL-MCNC: 4.1 MG/DL (ref 2.3–4.7)
PLATELET # BLD AUTO: 285 X10(3)/MCL (ref 140–400)
PMV BLD AUTO: 9.8 FL (ref 6.8–10)
POTASSIUM SERPL-SCNC: 3.5 MMOL/L (ref 3.5–5.1)
PROT SERPL-MCNC: 6.4 GM/DL (ref 5.8–7.6)
RBC # BLD AUTO: 3.8 X10(6)/MCL (ref 4.2–5.4)
SODIUM SERPL-SCNC: 139 MMOL/L (ref 136–145)
WBC # SPEC AUTO: 10.5 X10(3)/MCL (ref 4.5–11.5)

## 2021-01-10 LAB
ABS NEUT (OLG): 8.7 X10(3)/MCL (ref 1.5–6.9)
ALBUMIN SERPL-MCNC: 3.9 GM/DL (ref 3.4–4.8)
ALBUMIN/GLOB SERPL: 1.1 RATIO (ref 1.1–2)
ALP SERPL-CCNC: 119 UNIT/L (ref 40–150)
ALT SERPL-CCNC: 18 UNIT/L (ref 0–55)
AST SERPL-CCNC: 15 UNIT/L (ref 5–34)
BASOPHILS # BLD AUTO: 0 X10(3)/MCL (ref 0–0.1)
BASOPHILS NFR BLD AUTO: 0 % (ref 0–1)
BILIRUB SERPL-MCNC: 0.7 MG/DL
BILIRUBIN DIRECT+TOT PNL SERPL-MCNC: 0.3 MG/DL (ref 0–0.5)
BILIRUBIN DIRECT+TOT PNL SERPL-MCNC: 0.4 MG/DL (ref 0–0.8)
BUN SERPL-MCNC: 99 MG/DL (ref 9.8–20.1)
CALCIUM SERPL-MCNC: 9.9 MG/DL (ref 8.4–10.2)
CHLORIDE SERPL-SCNC: 94 MMOL/L (ref 98–107)
CO2 SERPL-SCNC: 31 MMOL/L (ref 23–31)
CREAT SERPL-MCNC: 3 MG/DL (ref 0.55–1.02)
EOSINOPHIL # BLD AUTO: 0.4 X10(3)/MCL (ref 0–0.6)
EOSINOPHIL NFR BLD AUTO: 3 % (ref 0–5)
ERYTHROCYTE [DISTWIDTH] IN BLOOD BY AUTOMATED COUNT: 19 % (ref 11.5–17)
GLOBULIN SER-MCNC: 3.4 GM/DL (ref 2.4–3.5)
GLUCOSE SERPL-MCNC: 192 MG/DL (ref 82–115)
HCT VFR BLD AUTO: 33 % (ref 36–48)
HGB BLD-MCNC: 10 GM/DL (ref 12–16)
IMM GRANULOCYTES # BLD AUTO: 0.02 10*3/UL (ref 0–0.02)
IMM GRANULOCYTES NFR BLD AUTO: 0.2 % (ref 0–0.43)
LYMPHOCYTES # BLD AUTO: 1.7 X10(3)/MCL (ref 0.5–4.1)
LYMPHOCYTES NFR BLD AUTO: 14 % (ref 15–40)
MAGNESIUM SERPL-MCNC: 2.06 MG/DL (ref 1.6–2.6)
MCH RBC QN AUTO: 24 PG (ref 27–34)
MCHC RBC AUTO-ENTMCNC: 30 GM/DL (ref 31–36)
MCV RBC AUTO: 78 FL (ref 80–99)
MONOCYTES # BLD AUTO: 1 X10(3)/MCL (ref 0–1.1)
MONOCYTES NFR BLD AUTO: 8 % (ref 4–12)
NEUTROPHILS # BLD AUTO: 8.7 X10(3)/MCL (ref 1.5–6.9)
NEUTROPHILS NFR BLD AUTO: 74 % (ref 43–75)
PHOSPHATE SERPL-MCNC: 3.5 MG/DL (ref 2.3–4.7)
PLATELET # BLD AUTO: 328 X10(3)/MCL (ref 140–400)
PMV BLD AUTO: 9.9 FL (ref 6.8–10)
POTASSIUM SERPL-SCNC: 3.2 MMOL/L (ref 3.5–5.1)
PROT SERPL-MCNC: 7.3 GM/DL (ref 5.8–7.6)
RBC # BLD AUTO: 4.25 X10(6)/MCL (ref 4.2–5.4)
SODIUM SERPL-SCNC: 140 MMOL/L (ref 136–145)
WBC # SPEC AUTO: 11.8 X10(3)/MCL (ref 4.5–11.5)

## 2021-01-12 LAB
ABS NEUT (OLG): 7.6 X10(3)/MCL (ref 1.5–6.9)
ALBUMIN SERPL-MCNC: 4.1 GM/DL (ref 3.4–4.8)
ALBUMIN/GLOB SERPL: 1.4 RATIO (ref 1.1–2)
ALP SERPL-CCNC: 103 UNIT/L (ref 40–150)
ALT SERPL-CCNC: 18 UNIT/L (ref 0–55)
ANISOCYTOSIS BLD QL SMEAR: ABNORMAL
AST SERPL-CCNC: 16 UNIT/L (ref 5–34)
BASOPHILS # BLD AUTO: 0.1 X10(3)/MCL (ref 0–0.1)
BASOPHILS NFR BLD AUTO: 1 % (ref 0–1)
BILIRUB SERPL-MCNC: 0.8 MG/DL
BILIRUBIN DIRECT+TOT PNL SERPL-MCNC: 0.3 MG/DL (ref 0–0.5)
BILIRUBIN DIRECT+TOT PNL SERPL-MCNC: 0.5 MG/DL (ref 0–0.8)
BUN SERPL-MCNC: 106 MG/DL (ref 9.8–20.1)
CALCIUM SERPL-MCNC: 9.7 MG/DL (ref 8.4–10.2)
CHLORIDE SERPL-SCNC: 95 MMOL/L (ref 98–107)
CO2 SERPL-SCNC: 29 MMOL/L (ref 23–31)
CREAT SERPL-MCNC: 3.5 MG/DL (ref 0.55–1.02)
EOSINOPHIL # BLD AUTO: 0.4 X10(3)/MCL (ref 0–0.6)
EOSINOPHIL NFR BLD AUTO: 4 % (ref 0–5)
ERYTHROCYTE [DISTWIDTH] IN BLOOD BY AUTOMATED COUNT: 19.5 % (ref 11.5–17)
GLOBULIN SER-MCNC: 2.9 GM/DL (ref 2.4–3.5)
GLUCOSE SERPL-MCNC: 73 MG/DL (ref 82–115)
HCT VFR BLD AUTO: 34.1 % (ref 36–48)
HGB BLD-MCNC: 9.5 GM/DL (ref 12–16)
HYPOCHROMIA BLD QL SMEAR: ABNORMAL
IMM GRANULOCYTES # BLD AUTO: 0.02 10*3/UL (ref 0–0.02)
IMM GRANULOCYTES NFR BLD AUTO: 0.2 % (ref 0–0.43)
LYMPHOCYTES # BLD AUTO: 1.6 X10(3)/MCL (ref 0.5–4.1)
LYMPHOCYTES NFR BLD AUTO: 15 % (ref 15–40)
MAGNESIUM SERPL-MCNC: 2.28 MG/DL (ref 1.6–2.6)
MCH RBC QN AUTO: 23 PG (ref 27–34)
MCHC RBC AUTO-ENTMCNC: 28 GM/DL (ref 31–36)
MCV RBC AUTO: 82 FL (ref 80–99)
MONOCYTES # BLD AUTO: 0.9 X10(3)/MCL (ref 0–1.1)
MONOCYTES NFR BLD AUTO: 9 % (ref 4–12)
NEUTROPHILS # BLD AUTO: 7.6 X10(3)/MCL (ref 1.5–6.9)
NEUTROPHILS NFR BLD AUTO: 71 % (ref 43–75)
OVALOCYTES BLD QL SMEAR: ABNORMAL
PHOSPHATE SERPL-MCNC: 3.7 MG/DL (ref 2.3–4.7)
PLATELET # BLD AUTO: 315 X10(3)/MCL (ref 140–400)
PLATELET # BLD EST: ADEQUATE 10*3/UL
PMV BLD AUTO: 11 FL (ref 6.8–10)
POIKILOCYTOSIS BLD QL SMEAR: ABNORMAL
POTASSIUM SERPL-SCNC: 4.3 MMOL/L (ref 3.5–5.1)
PROT SERPL-MCNC: 7 GM/DL (ref 5.8–7.6)
RBC # BLD AUTO: 4.16 X10(6)/MCL (ref 4.2–5.4)
RBC MORPH BLD: ABNORMAL
SODIUM SERPL-SCNC: 143 MMOL/L (ref 136–145)
WBC # SPEC AUTO: 10.7 X10(3)/MCL (ref 4.5–11.5)

## 2021-02-04 ENCOUNTER — HISTORICAL (OUTPATIENT)
Dept: RADIOLOGY | Facility: HOSPITAL | Age: 76
End: 2021-02-04

## 2021-02-04 LAB
ABS NEUT (OLG): 5.8 X10(3)/MCL (ref 1.5–6.9)
ALBUMIN SERPL-MCNC: 3.5 GM/DL (ref 3.4–4.8)
ALBUMIN/GLOB SERPL: 1.2 RATIO (ref 1.1–2)
ALP SERPL-CCNC: 111 UNIT/L (ref 40–150)
ALT SERPL-CCNC: 36 UNIT/L (ref 0–55)
AST SERPL-CCNC: 22 UNIT/L (ref 5–34)
BASOPHILS # BLD AUTO: 0.2 X10(3)/MCL (ref 0–0.1)
BASOPHILS NFR BLD AUTO: 2 % (ref 0–1)
BILIRUB SERPL-MCNC: 0.7 MG/DL
BILIRUBIN DIRECT+TOT PNL SERPL-MCNC: 0.3 MG/DL (ref 0–0.8)
BILIRUBIN DIRECT+TOT PNL SERPL-MCNC: 0.4 MG/DL (ref 0–0.5)
BUN SERPL-MCNC: 47 MG/DL (ref 9.8–20.1)
CALCIUM SERPL-MCNC: 10.5 MG/DL (ref 8.4–10.2)
CHLORIDE SERPL-SCNC: 103 MMOL/L (ref 98–107)
CO2 SERPL-SCNC: 28 MMOL/L (ref 23–31)
CREAT SERPL-MCNC: 2.41 MG/DL (ref 0.55–1.02)
EOSINOPHIL # BLD AUTO: 0.3 X10(3)/MCL (ref 0–0.6)
EOSINOPHIL NFR BLD AUTO: 4 % (ref 0–5)
ERYTHROCYTE [DISTWIDTH] IN BLOOD BY AUTOMATED COUNT: 19.3 % (ref 11.5–17)
GLOBULIN SER-MCNC: 3 GM/DL (ref 2.4–3.5)
GLUCOSE SERPL-MCNC: 77 MG/DL (ref 82–115)
HCT VFR BLD AUTO: 34.6 % (ref 36–48)
HGB BLD-MCNC: 10.2 GM/DL (ref 12–16)
IMM GRANULOCYTES # BLD AUTO: 0.04 10*3/UL (ref 0–0.02)
IMM GRANULOCYTES NFR BLD AUTO: 0.4 % (ref 0–0.43)
LDH SERPL-CCNC: 295 UNIT/L (ref 140–271)
LYMPHOCYTES # BLD AUTO: 1.9 X10(3)/MCL (ref 0.5–4.1)
LYMPHOCYTES NFR BLD AUTO: 21 % (ref 15–40)
MCH RBC QN AUTO: 22 PG (ref 27–34)
MCHC RBC AUTO-ENTMCNC: 30 GM/DL (ref 31–36)
MCV RBC AUTO: 76 FL (ref 80–99)
MONOCYTES # BLD AUTO: 0.8 X10(3)/MCL (ref 0–1.1)
MONOCYTES NFR BLD AUTO: 9 % (ref 4–12)
NEUTROPHILS # BLD AUTO: 5.8 X10(3)/MCL (ref 1.5–6.9)
NEUTROPHILS NFR BLD AUTO: 64 % (ref 43–75)
PLATELET # BLD AUTO: 317 X10(3)/MCL (ref 140–400)
PMV BLD AUTO: 10 FL (ref 6.8–10)
POC CREATININE: 2.7 MG/DL (ref 0.6–1.3)
POTASSIUM SERPL-SCNC: 4.5 MMOL/L (ref 3.5–5.1)
PROT SERPL-MCNC: 6.5 GM/DL (ref 5.8–7.6)
RBC # BLD AUTO: 4.53 X10(6)/MCL (ref 4.2–5.4)
SODIUM SERPL-SCNC: 143 MMOL/L (ref 136–145)
WBC # SPEC AUTO: 9 X10(3)/MCL (ref 4.5–11.5)

## 2021-03-06 ENCOUNTER — HISTORICAL (OUTPATIENT)
Dept: ADMINISTRATIVE | Facility: HOSPITAL | Age: 76
End: 2021-03-06

## 2021-03-06 LAB
APPEARANCE, UA: CLEAR
BILIRUB UR QL STRIP: NEGATIVE
COLOR UR: YELLOW
GLUCOSE (UA): NEGATIVE MG/DL
HGB UR QL STRIP: NEGATIVE
KETONES UR QL STRIP: NEGATIVE MG/DL
LEUKOCYTE ESTERASE UR QL STRIP: NEGATIVE
NITRITE UR QL STRIP: NEGATIVE
PH UR STRIP: 6.5 [PH] (ref 4.6–8)
PROT UR QL STRIP: NEGATIVE MG/DL
SP GR UR STRIP: 1.02 (ref 1–1.03)
UROBILINOGEN UR STRIP-ACNC: 0.2 EU/DL

## 2021-03-08 ENCOUNTER — HISTORICAL (OUTPATIENT)
Dept: ADMINISTRATIVE | Facility: HOSPITAL | Age: 76
End: 2021-03-08

## 2021-03-08 LAB
ABS NEUT (OLG): 6 X10(3)/MCL (ref 1.5–6.9)
ALBUMIN SERPL-MCNC: 3.7 GM/DL (ref 3.4–4.8)
ALBUMIN/GLOB SERPL: 1.3 RATIO (ref 1.1–2)
ALP SERPL-CCNC: 121 UNIT/L (ref 40–150)
ALT SERPL-CCNC: 26 UNIT/L (ref 0–55)
ANISOCYTOSIS BLD QL SMEAR: ABNORMAL
AST SERPL-CCNC: 22 UNIT/L (ref 5–34)
BILIRUB SERPL-MCNC: 0.9 MG/DL
BILIRUBIN DIRECT+TOT PNL SERPL-MCNC: 0.4 MG/DL (ref 0–0.8)
BILIRUBIN DIRECT+TOT PNL SERPL-MCNC: 0.5 MG/DL (ref 0–0.5)
BUN SERPL-MCNC: 69 MG/DL (ref 9.8–20.1)
CALCIUM SERPL-MCNC: 9.6 MG/DL (ref 8.4–10.2)
CHLORIDE SERPL-SCNC: 100 MMOL/L (ref 98–107)
CO2 SERPL-SCNC: 30 MMOL/L (ref 23–31)
CREAT SERPL-MCNC: 2.6 MG/DL (ref 0.55–1.02)
ERYTHROCYTE [DISTWIDTH] IN BLOOD BY AUTOMATED COUNT: 23.5 % (ref 11.5–17)
GLOBULIN SER-MCNC: 2.9 GM/DL (ref 2.4–3.5)
GLUCOSE SERPL-MCNC: 139 MG/DL (ref 82–115)
HCT VFR BLD AUTO: 37.8 % (ref 36–48)
HGB BLD-MCNC: 10.5 GM/DL (ref 12–16)
HYPOCHROMIA BLD QL SMEAR: ABNORMAL
MCH RBC QN AUTO: 22 PG (ref 27–34)
MCHC RBC AUTO-ENTMCNC: 28 GM/DL (ref 31–36)
MCV RBC AUTO: 80 FL (ref 80–99)
PLATELET # BLD AUTO: 327 X10(3)/MCL (ref 140–400)
PLATELET # BLD EST: ADEQUATE 10*3/UL
PMV BLD AUTO: 10.7 FL (ref 6.8–10)
POTASSIUM SERPL-SCNC: 3.2 MMOL/L (ref 3.5–5.1)
PROT SERPL-MCNC: 6.6 GM/DL (ref 5.8–7.6)
RBC # BLD AUTO: 4.74 X10(6)/MCL (ref 4.2–5.4)
RBC MORPH BLD: ABNORMAL
SODIUM SERPL-SCNC: 146 MMOL/L (ref 136–145)
WBC # SPEC AUTO: 8.9 X10(3)/MCL (ref 4.5–11.5)

## 2021-04-08 ENCOUNTER — HISTORICAL (OUTPATIENT)
Dept: ADMINISTRATIVE | Facility: HOSPITAL | Age: 76
End: 2021-04-08

## 2021-04-08 LAB
APPEARANCE, UA: CLEAR
BILIRUB UR QL STRIP: NEGATIVE
COLOR UR: YELLOW
GLUCOSE (UA): NEGATIVE MG/DL
HGB UR QL STRIP: NEGATIVE
KETONES UR QL STRIP: NEGATIVE MG/DL
LEUKOCYTE ESTERASE UR QL STRIP: NEGATIVE
NITRITE UR QL STRIP: NEGATIVE
PH UR STRIP: 5 [PH] (ref 4.6–8)
PROT UR QL STRIP: NEGATIVE MG/DL
SP GR UR STRIP: 1.02 (ref 1–1.03)
UROBILINOGEN UR STRIP-ACNC: 0.2 EU/DL

## 2021-04-11 LAB — FINAL CULTURE: NO GROWTH

## 2021-07-27 ENCOUNTER — HISTORICAL (OUTPATIENT)
Dept: RADIOLOGY | Facility: HOSPITAL | Age: 76
End: 2021-07-27

## 2021-10-04 ENCOUNTER — HISTORICAL (OUTPATIENT)
Dept: ADMINISTRATIVE | Facility: HOSPITAL | Age: 76
End: 2021-10-04

## 2021-10-04 LAB
APPEARANCE, UA: ABNORMAL
BACTERIA SPEC CULT: ABNORMAL /HPF
BILIRUB UR QL STRIP: ABNORMAL
COLOR UR: ABNORMAL
GLUCOSE (UA): NEGATIVE MG/DL
HGB UR QL STRIP: NEGATIVE
KETONES UR QL STRIP: ABNORMAL MG/DL
LEUKOCYTE ESTERASE UR QL STRIP: NEGATIVE
NITRITE UR QL STRIP: NEGATIVE
PH UR STRIP: 6.5 [PH] (ref 4.6–8)
PROT UR QL STRIP: 30 MG/DL
RBC #/AREA URNS HPF: ABNORMAL /HPF
SP GR UR STRIP: 1.02 (ref 1–1.03)
SQUAMOUS EPITHELIAL, UA: ABNORMAL /LPF
UROBILINOGEN UR STRIP-ACNC: 0.2 EU/DL
WBC #/AREA URNS HPF: ABNORMAL /HPF

## 2021-12-03 ENCOUNTER — HISTORICAL (OUTPATIENT)
Dept: ANESTHESIOLOGY | Facility: HOSPITAL | Age: 76
End: 2021-12-03

## 2022-02-11 ENCOUNTER — HISTORICAL (OUTPATIENT)
Dept: ANESTHESIOLOGY | Facility: HOSPITAL | Age: 77
End: 2022-02-11

## 2022-02-11 LAB — CBG: 123 (ref 70–115)

## 2022-02-25 ENCOUNTER — HISTORICAL (OUTPATIENT)
Dept: ANESTHESIOLOGY | Facility: HOSPITAL | Age: 77
End: 2022-02-25

## 2022-02-25 LAB — CBG: 108 (ref 70–115)

## 2022-03-11 ENCOUNTER — HISTORICAL (OUTPATIENT)
Dept: ANESTHESIOLOGY | Facility: HOSPITAL | Age: 77
End: 2022-03-11

## 2022-03-11 LAB — CBG: 101 (ref 70–115)

## 2022-04-11 ENCOUNTER — HISTORICAL (OUTPATIENT)
Dept: ADMINISTRATIVE | Facility: HOSPITAL | Age: 77
End: 2022-04-11
Payer: MEDICAID

## 2022-04-13 ENCOUNTER — HISTORICAL (OUTPATIENT)
Dept: ADMINISTRATIVE | Facility: HOSPITAL | Age: 77
End: 2022-04-13
Payer: MEDICAID

## 2022-04-13 LAB
CREAT UR-MCNC: 168.4 MG/DL (ref 45–106)
MICROALBUMIN UR-MCNC: 130.3
MICROALBUMIN/CREAT RATIO PNL UR: 77.4 (ref 0–30)
PROT UR STRIP-MCNC: 54 MG/DL

## 2022-04-27 VITALS
WEIGHT: 293 LBS | DIASTOLIC BLOOD PRESSURE: 69 MMHG | HEIGHT: 67 IN | SYSTOLIC BLOOD PRESSURE: 129 MMHG | OXYGEN SATURATION: 97 % | BODY MASS INDEX: 45.99 KG/M2

## 2022-04-30 NOTE — OP NOTE
Patient:   Danae Sanchez            MRN: [id number]            FIN: [id number]               Age:   73 years     Sex:  Female     :  1945   Associated Diagnoses:   Anemia; Benign lipomatous neoplasm of other sites   Author:   Blanquita George MD      Operative Note   Operative Information   Date/ Time:  2018 08:30:00.     Procedures Performed: Procedure Code   Colonoscopy, flexible; diagnostic, including collection of specimen(s) by brushing or washing, when performed (separate procedure) (46835).  Colonoscopy, flexible; with biopsy, single or multiple (73156).  Colonoscopy, flexible; with directed submucosal injection(s), any substance (48287)..     Indications: 73-year-old female with anemia history of non-Hodgkin's lymphoma and colonoscopy is not up-to-date.     Preoperative Diagnosis: Anemia (EQM97-DW D64.9).     Postoperative Diagnosis: Benign lipomatous neoplasm of other sites (ENW88-UZ D17.79).     Surgeon: Blanquita George MD.     Anesthesia: intravenous Mac.     Speciman Removed: submucosal nodule ascending colon.     Description of Procedure/Findings/    Complications: procedure in detail: Patient was brought to the endoscopy suite laid in the left lateral decubitus position right side up. Intravenous anesthesia was provided. Digital rectal exam performed exhibiting good anal rectal tone and no masses. An endoscope was then passed through the anus into being the rectum with gentle insufflation reaching the cecum. Upon reaching the cecum pictures were taken. Scope was slowly withdrawn. The cecum appeared to be grossly normal. Within the mid ascending a small submucosal nodule was identified. This region was marked with submucosal injection of ink for later identification. Biopsy was then performed of the nodule and pictures are taken. It had the gross appearance of a likely submucosal lipoma however given the patient's history of a nodular lymphoma biopsy was performed.  The remainder  of the ascending transverse descending and rectosigmoid colon all appear to be grossly normal otherwise. There were no nodules masses or polyps identified elsewhere. Scope was retroflexed and the distal rectum revealing small internal hemorrhoids without signs thrombosis ulceration or bleeding. The scope was returned to neutral position the colon was evacuated of air. The patient was relieved anesthesia stable condition and transferred to postanesthesia care unit..     Findings: submucosal nodule ascending colon.     Complications: None.     Notes: review final path results.

## 2022-04-30 NOTE — OP NOTE
Patient:   Danae Sanchez            MRN: [id number]            FIN: [id number]               Age:   72 years     Sex:  Female     :  1945   Associated Diagnoses:   Lymphoma, non-Hodgkin's   Author:   Blanquita George MD      Operative Note   Operative Information   Date/ Time:  2017 18:37:00.     Procedures Performed: Procedure Code   Removal of tunneled central venous access device, with subcutaneous port or pump, central or peripheral insertion (89000)..     Indications: 72-year-old female with previous history of lymphoma undergoing appropriate chemotherapy with remission and no use for Port-A-Cath.     Preoperative Diagnosis: Lymphoma, non-Hodgkin's (CEK04-QD C85.90).     Postoperative Diagnosis: Lymphoma, non-Hodgkin's (IQD29-CN C85.90).     Surgeon: Blanquita George MD.     Anesthesia: intravenous Mac.     Speciman Removed: subcutaneous port with tunneled subcutaneous catheter.     Description of Procedure/Findings/    Complications: procedure in detail: Patient brought to the operating theater laid in the supine position and general endotracheal intubation anesthesia was provided. There the right neck and chest were sterilely prepped and draped in normal surgical fashion using chlorhexidine. Preoperative antibiotics were administered. Port catheter was noted within the right anterior chest. Previous incision site was used to demarcate the region. Quadrant percent Marcaine and epinephrine used to infiltrate the subcutaneous tissues. #15 blade was used to dissect down to the subcutaneous tissues and fat reaching the subcutaneous port. Upon reaching the port and the cavity was then opened. Scar tissue was incised and the port was extracted by transecting 3 Ethibond sutures holding it in position within the chest wall.  The tunnel catheter site was then ligated using 3-0 Vicryl figure-of-eight fashion. The catheter was then extracted from its insertion point without difficulty and the suture  ligating the tract. The catheter along with the subcutaneous port was inserted in a specimen to pathology for identification. The subcutaneous port cavity was then excised using Bovie cauterization. The cavity was made hemostatic. The wound was then closed in layered fashion using 3-0 Vicryl running 4-0 subcuticular Monocryl. Sterile dressing was then placed over the wound. The patient was then relieved anesthesia stable condition and transferred to postanesthesia care unit..     Esimated blood loss: loss  3  cc.

## 2022-04-30 NOTE — H&P
She is a nursing home patient of [facility], is being directly admitted secondary to acute worsening of her chronic renal insufficiency.  She is being followed by Dr. Mckinnon and has a fistula in her left arm in preparation of dialysis which has yet to be initiated.  At the nursing home, she has been diuresed secondary to CHF and lower extremity venous insufficiency.    PAST MEDICAL HISTORY:  Significant for atrial fibrillation, anxiety, chronic renal insufficiency, atherosclerosis of the coronary arteries, previous basal cell carcinoma of her nose removed, hypertension, chronic kidney disease as stated above, depression diabetes 2.  She has a low ejection fraction, less than 50%, hyperlipidemia, hypothyroidism, history of lymphoma, morbid obesity, obstructive sleep apnea, osteoporosis, peptic ulcer in the past, increased risk for falls, bilateral lower extremity edema secondary to venous insufficiency and vitamin D deficiency.    MEDICATIONS:  On the chart and reconciled.    PAST SURGICAL HISTORY:  Include carpal tunnel release surgery, cholecystectomy, colonoscopy, hemorrhoid surgery, hysterectomy which was partial, mole removal which was the basal cell as previously stated, and a left arm fistula placement for dialysis.    FAMILY MEDICAL HISTORY:  Father had a heart attack.  Mother had congestive heart failure, diabetes type 2 and brother had colon cancer, lung neoplasm.    SOCIAL HISTORY:  No tobacco use.  No alcohol use.  No drug use.  She is .    ALLERGIES:  INCLUDE CODEINE, LISINOPRIL, WHICH CAUSES A COUGH, MORPHINE AND CORTISONE WHICH CAUSES VOMITING.      REVIEW OF SYSTEMS:  Otherwise negative except for increased fatigue.      PHYSICAL EXAMINATION:  GENERAL:  Patient is morbidly obese, in no acute distress, lying in bed.     HEENT:  Examination is with within normal limits.     HEART:  Regular rate and rhythm.     LUNGS:  Clear to auscultation bilaterally with minimal crackles in the bases.      ABDOMEN:  Nontender, obese.   EXTREMITIES:  No significant edema.  Bilateral lower extremities have 2+ edema.     NECK:  Full range of motion.  No significant adenopathy.      RECTAL BREASTS:  Deferred at this time.     NEUROLOGIC:  She is alert and oriented x3.  Moves all 4 extremities.    ASSESSMENT:    1. Acute worsening of chronic renal insufficiency.  2. Continued edema bilateral lower extremities secondary to venous ulceration.  3. CHF with decreased systolic function.  4. Morbid obesity.  5. Multiple other medical problems as stated above.    PLAN:  Diurese patient.  Give albumin Lasix drip with D5W slowly.  Continue other medications, except hold her Lasix.  Follow up in the morning.        PBS/MODL   DD: 01/08/2021 1836   DT: 01/08/2021 1858  Job # [id number]

## 2022-04-30 NOTE — OP NOTE
DATE OF SURGERY:    06/13/2017    SURGEON:  Victor M Acosta MD    PREOPERATIVE DIAGNOSIS:  Cervicalgia.    POSTOPERATIVE DIAGNOSIS:  Cervicalgia.    PROCEDURE:  Fluoroscopically guided intralaminar cervical epidural injection at C4-5.    EQUIPMENT USED:  Epidural tray.    DETAILED DESCRIPTION:  Following informed consent, the patient was prepped and draped in the usual sterile fashion.  I infiltrated the tissue overlying C4-5 with local anesthetic.  Under fluoroscopic guidance, I advanced a 25-gauge 3.5-inch BD spinal needle into the epidural space.  Positioning was confirmed with administration of contrast.  I then instilled a combination of 80 mg Depo-Medrol and 1 mL of 5% dextrose in water.  I removed the needle and applied sterile dressing.  The patient tolerated the procedure well without apparent complication.    IMPRESSION:  Successful fluoroscopically guided intralaminar cervical epidural injection at C4-5.        ______________________________  Victor M Acosta MD    DP/CD  DD:  06/13/2017  Time:  11:00AM  DT:  06/13/2017  Time:  12:58PM  Job #:  [id number]

## 2022-04-30 NOTE — OP NOTE
DATE OF SURGERY:    08/08/2017    SURGEON:  Victor M Acosta MD    PREOPERATIVE DIAGNOSIS:  Lumbar radiculopathy.    POSTOPERATIVE DIAGNOSIS:  Lumbar radiculopathy.    PROCEDURE:  Fluoroscopically guided transforaminal lumbar epidural injection at right L2-3.    EQUIPMENT USED:  Epidural tray.    PROCEDURE IN DETAIL:  Following informed consent, the patient was prepped and draped in the usual sterile fashion.  I infiltrated the tissue overlying L2-3 with local anesthetic.  Under fluoroscopic guidance, I advanced a 22-gauge, 5 inch BD spinal needle into the epidural space via a right transforaminal approach.  Positioning was confirmed with administration of contrast.  I then instilled a combination of 160 mg of Depo-Medrol and 1 ml of 5% dextrose in water.  I removed the needle and applied a sterile dressing.  The patient tolerated the procedure well without apparent complication.    IMPRESSION:  Successful fluoroscopically guided transforaminal lumbar epidural injection at right L2-3.        ______________________________  Victor M Acosta MD    DP/CRUZ  DD:  08/08/2017  Time:  01:48PM  DT:  08/09/2017  Time:  06:46AM  Job #:  [id number]

## 2022-04-30 NOTE — OP NOTE
DATE OF SURGERY:    12/07/2017    SURGEON:  Victor M Acosta MD    PREOPERATIVE DIAGNOSIS:  Lumbar radiculopathy.    POSTOPERATIVE DIAGNOSE:  Lumbar radiculopathy.    PROCEDURE PERFORMED:  Fluoroscopically-guided transforaminal lumbar epidural injection at right L2-3.    EQUIPMENT USED:  Epidural tray.    DETAILED DESCRIPTION:  Following informed consent, the patient was prepped and draped in the usual sterile fashion.  I infiltrated the tissue overlying L2-3 with local anesthetic.  Under fluoroscopic guidance, I advanced a 22-gauge 3.5 inch BD spinal needle into the epidural space via right transforaminal approach.  Positioning was confirmed with administration of contrast.  I then instilled a combination of 160 mg Depo-Medrol and 1 mL of 5% dextrose in water.  I removed the needle, applied a sterile dressing.  The patient tolerated the procedure well without apparent complication.    IMPRESSION:  Successful fluoroscopically-guided transforaminal lumbar epidural injection at right L2-3.        ______________________________  MD GAUDENCIO Perry/[facility]  DD:  12/07/2017  Time:  08:55AM  DT:  12/08/2017  Time:  07:14AM  Job #:  [id number]

## 2022-04-30 NOTE — DISCHARGE SUMMARY
HOSPITAL COURSE:  She was admitted secondary to acute on chronic renal insufficiency.  She also had mild CHF with underlying decreased systolic function.  She has morbid obesity and bilateral lower extremity edema secondary to her venous insufficiency with a venous ulceration.  After hospitalization, patient was placed on an albumin/Lasix drip.  She had good output, and her breathing improved.  She was on oxygen at first and was weaned.  She has severe debility secondary to her underlying medical problems including her obesity, and she also had low potassium.  She was not sleeping very well, and we gave her Benadryl.  Dr. Mckinnon was consulted and agreed with our plan.  Recommended continuing the Lasix/albumin drip.     Chest x-ray on admit showed mild cardiomegaly with increased vascular markings of the lower lungs bilaterally.  Labs were done on admit, showing a BUN and creatinine of 134 and 3.78; by time of discharge, this had improved to 106 and 3.5.  Her breathing improved, and her venous insufficiency improved.  She does have underlying anemia, as well, and her H and H at time of discharge was stable at 9.5 and 34.1.  She was allowed to be discharged back to the nursing home on 01/12/2021.    PLAN:  Discharge patient to nursing home.  Continue Tylenol p.r.n., Eliquis 2.5 mg b.i.d., vitamin C 500 mg daily, Lipitor 80 mg taking 1/2 pill q.h.s., calcitriol 0.5 __________ daily, chlorthalidone 50 mg daily, diltiazem 120 mg b.i.d., docusate 1 p.o. daily, Prozac 40 mg 1 p.o. daily, metoprolol 25 mg daily, metolazone 2.5 mg 1 p.o. daily, levothyroxine 200 mcg daily, 70/30 Humulin insulin 10 units in the a.m. and 7 units in the p.m. prior to eating, omeprazole 20 mg 1 p.o. daily, multivitamin 1 p.o. daily.  Activity ad kia as tolerated with fall precautions.  Diet is low-protein, cardiac, diabetic diet.  Elevate legs as tolerated.    ASSESSMENT:    1. Acute on chronic renal insufficiency.  2. Morbid  obesity.  3. Underlying debility.  4. Venous insufficiency of bilateral lower extremities with edema.  5.   Early congestive heart failure with underlying systolic dysfunction.  6. Atrial fibrillation with rapid ventricular response, improved.        CHRISTINE/EDMUNDO   DD: 01/28/2021 1459   DT: 01/28/2021 1517  Job # [id number]

## 2022-04-30 NOTE — PROGRESS NOTES
Patient:   Danae Sanchez            MRN: [id number]            FIN: [id number]               Age:   72 years     Sex:  Female     :  1945   Associated Diagnoses:   None   Author:   Gina SAGE, Brook WALDEN      Visit Information      Self referral, pt previously followed by Dr. Sanchez    Problem List:   1.  History of follicular lymphoma, diagnosed     2.  Anemia    3.  CKD, stage III    Current Treatment: Observation    Treatment Completed:    Rituxan and 2 years of maintenance    Clinical History:  Mrs. Sanchez is a 72 WF with multiple medical problems; her list includes HTN, DM, CKD, hypothyroidism, gout, TIFFANY, and disc problems in her neck and back.  She was diagnosed with follicular lymphoma in  when CT abdomen done to evaluate a suspected ventral hernia revealed an abnormal peripancreatic mass.  CT guided biopsy performed on 08 confirmed diagnosis of low grade follicular lymphoma.  She was treated with Rituxan and had a good response to treatment; follow up scans were unremarkable.  For the past few years, she has been following up with Dr. Sanchez q 6 months with labs.  She also has anemia secondary to chronic kidney disease for which she was previously treated with Aranesp.  Per pt, though, it's been several years since she has received an Aranesp injection.  Because of the changes in Dr. Sanchez's practice, pt decided to transfer her care to [facility] at [facility].      Pt presents today to establish care.  She feels relatively well and denies new or recent problems. She reports intermittent frontal headaches, chronic sinus/allergy problems, BENTON and LE edema.  She denies fevers/chills, NS or weight loss.  She also denies cough, GI problems,  complaints or bleeding.  She developed a rash at the site of her MP after a recent flush which she thinks is a reaction to the antiseptic used.  She admits to being stressed/depressed secondary to her family situation.  She is the primary caregiver for  her  who has dementia and her 51 yo son who is morbidly obese and in a NH in [place].        Chief Complaint   9/28/2017 13:35 CDT      NPH: Anemia        Interval History   Initial visit      Review of Systems   Constitutional:  Fatigue, No fever, No chills, No loss of appetite, No weight loss.    Eye:  Recent visual problem (she is being followed by optometrist for cataracts; she has appt on Monday).    Ear/Nose/Mouth/Throat:  Hoarse voice, Chronic allergy/sinus problems.  , No sore throat.    Respiratory:  Shortness of breath (with exertion), No cough, No sputum production.    Cardiovascular:  Peripheral edema (chronic), No chest pain.    Gastrointestinal:  Negative.    Genitourinary:  Negative.    Hematology/Lymphatics:  Negative.    Endocrine:  Negative.    Immunologic:  Negative.    Musculoskeletal:  Neck pain, Joint pain.         Back pain: In the upper region.    Integumentary:  Rash (R upper chest wall since last MP flush).    Neurologic:  Alert and oriented X4, Headache (occasional frontal headaches).    Psychiatric:  Anxiety, Depression.       Health Status   Allergies:    Allergic Reactions (Selected)  Severity Not Documented  Codeine- Vomiting.  Cortisone- Nausea.  Morphine- Itching.,    Allergies (3) Active Reaction  codeine Vomiting  cortisone Nausea  morphine Itching     Current medications:  (Selected)   Documented Medications  Documented  ALLOPURINOL 100 MG TABS: 100 mg = 1 tab(s), Oral, Daily  ALLOPURINOL 300 MG TABS:   ALPRAZOLAM .25 MG TABS:   AMITRIPTYLINE HCL 75 MG TABS: 75 mg = 1 tab(s), Oral, qPM  ATORVASTATIN CALCIUM 80 MG TABS: 80 mg = 1 tab(s), Oral, Daily  DOXAZOSIN 4 MG TABS: 4 mg = 1 tab(s), Oral, Daily  FLUOXETINE HCL 40 MG CAPS: 40 mg = 1 cap(s), Oral, Daily  FLUTICASONE PROPIONATE 50 MCG/ACT SUSP:   HUMULIN      INJ 70/30KWP:   IRBESARTAN 150 MG TABS: 150 mg = 1 tab(s), Oral, Daily  SMZ/TMP DS   -160: 1 tab(s), Oral, BID  TRAMADOL HCL 50 MG TABS:   VITAMIN D 66517  UNIT CAPS: 09824 International unit = 1 cap(s), Oral, qWeek  amLODIPine 5 mg oral tablet: 5 mg = 1 tab(s), Oral, Daily, 0 Refill(s)  aspirin 81 mg oral tablet: 81 mg = 1 tab(s), Oral, Daily, 0 Refill(s)  cetirizine 10 mg oral tablet: 10 mg = 1 tab(s), Oral, Daily, PRN PRN allergy symptoms, 0 Refill(s)  furosemide 80 mg oral tablet: 80 mg = 1 tab(s), Oral, BID, 0 Refill(s)  levothyroxine 175 mcg (0.175 mg) oral tablet: 175 mcg = 1 tab(s), Oral, Daily, 0 Refill(s)  omeprazole 20 mg oral DR capsule: 20 mg = 1 cap(s), Oral, Daily, 0 Refill(s)  potassium chloride 20 mEq oral TABLET extended release: 20 mEq = 1 tab(s), Oral, Daily, 0 Refill(s)   Problem list:    Active Problems (13)  Anemia   Diabetes   Dizziness   Hyperlipidemia   Hypertension   Knee pain   Lymphoma, follicular   Lymphoma, non-Hodgkin's   Neck pain   Osteoarthritis   Removal of implantable venous access port   Renal failure   Stage 3 chronic kidney disease         Histories   Past Medical History:    Active  Removal of implantable venous access port ([id number])   Family History:    Heart disease                                                                                                                                                                                                                           09-AUG-2016 15:25:48<$>  Father  Mother  , Father  at 64 from MI  Mother  at 76 from CHF, also had lymphoma  Brother with colon cancer   Procedure history:    Catheter Removal/MP (None) on 2017 at 72 Years.  Comments:  2017 10:06 - Ros Layne RN  auto-populated from documented surgical case  Injection Lumbar Epidural Steroid (., Posterior) on 2017 at 72 Years.  Comments:  2017 14:04 - Grace De RN  auto-populated from documented surgical case  Injection Cervical Epidural Steroid (.) on 2017 at 72 Years.  Comments:  2017 12:13 - Jaylin Hassan RN  auto-populated from documented surgical  case  Injection Cervical Epidural Steroid (None) on 1/6/2017 at 72 Years.  Comments:  1/6/2017 13:43 - Leslye Hayes RN  auto-populated from documented surgical case  Injection Cervical Epidural Steroid (None) on 12/23/2016 at 71 Years.  Comments:  12/23/2016 14:37 - Leslye Hayes RN  auto-populated from documented surgical case  Injection Cervical Epidural Steroid (None) on 11/11/2016 at 71 Years.  Comments:  11/11/2016 14:54 - Gina Muñoz RN  auto-populated from documented surgical case  angio of heart in 2015 at 70 Years.  hysterectomy.  left knee surgery.  Cholecysectomy.  Carpal Tunnel Bilateral Surgery.  Hernia Surgery.   Social History        Social & Psychosocial Habits    Alcohol  10/14/2016  Use: Never    Employment/School  08/21/2017  Status: Unemployed    Description: Homemaker    Highest education: High school    Exercise    Comment: As tolerated - 08/21/2017 10:46 - Ruthann Camarena LPN    Home/Environment  08/21/2017  Lives with: Spouse    Living situation: Home/Independent    Home equipment: CPAP/BiPAP, Glucose monitoring    Alcohol abuse in household: No    Substance abuse in household: No    Smoker in household: No    Injuries/Abuse/Neglect in household: No    Feels unsafe at home: No    Safe place to go: Yes    Agency(s)/Others notified: No    Family/Friends available to help: Yes    Concern for family members at home: No    Major illness in household: No    Financial concerns: No    Concerns over TV/Computer/Game use: No    Nutrition/Health  08/21/2017  Type of diet: Regular    Substance Abuse  10/14/2016  Use: Never    Tobacco  10/14/2016  Use: Never smoker  .        Physical Examination   Vital Signs   9/28/2017 13:35 CDT      Temperature Oral          37.8 DegC                             Peripheral Pulse Rate     88 bpm                             Respiratory Rate          18 br/min                             SpO2                      95 %                             Systolic Blood  Pressure   141 mmHg  HI                             Diastolic Blood Pressure  64 mmHg     Measurements from flowsheet : Measurements   9/28/2017 13:35 CDT      Weight Dosing             149.8 kg                             Weight Measured           149.8 kg                             Weight Measured and Calculated in Lbs     330.25 lb                             Height/Length Dosing      170.0 cm                             Height/Length Measured    170.0 cm                             BSA Measured              2.66 m2                             Body Mass Index Measured  51.83 kg/m2     General:  Alert and oriented, No acute distress.    Eye:  Pupils are equal, round and reactive to light, Extraocular movements are intact, Normal conjunctiva.    HENT:  Normocephalic, Oral mucosa is moist.    Neck:  Supple, Non-tender, No lymphadenopathy.    Respiratory:  Lungs are clear to auscultation, Respirations are non-labored.    Cardiovascular:  Normal rate, Regular rhythm, No murmur.         Edema: Bilateral, Lower extremity, 2+.    Gastrointestinal:  Soft, Non-tender, Normal bowel sounds.         Abdomen: Obese.    Genitourinary:  No costovertebral angle tenderness.    Lymphatics:  No axillary adenopathy noted.  .    Musculoskeletal:  Normal range of motion, Normal strength, No tenderness, No deformity, Normal gait.    Integumentary:  Warm, Dry, Pink, Mild erythema to skin R upper chest wall.  Prev MP site healed.  .    Neurologic:  Alert, Oriented, No focal deficits, Cranial Nerves II-XII are grossly intact.    Cognition and Speech:  Oriented, Speech clear and coherent.    Psychiatric:  Cooperative, Appropriate mood & affect.    ECOG Performance Scale: 1- Strenuous physical activity restricted; fully ambulatory and able to carry out light work.      Impression and Plan   1.  History of low grade follicular lymphoma, diag in 6/2008.  Pt was treated with Rituxan and per notes, had a nice response to treatment.  Her disease  has been quiescent since.  Today she is clinically stable and has no signs or symptoms to suggest disease progression.  Will check CBC, CMP and LDH today.  If okay, will arrange for follow up q 6 months with labs.    2.  Anemia secondary to CKD.  She was treated with Aranesp in the past but seems that she hasn't had it in years.  Will follow up CBC from today.  With regards to her CKD, she is followed by Dr. Mckinnon.      PLAN:  Labs today  RTC 6 months with labs    All of her questions were answered.  Pt was instructed to return to clinic sooner should she develop concerning signs or symptoms.      Brook Fuentes MD

## 2022-05-04 NOTE — HISTORICAL [facility]
This is a historical note converted from [employer]. Formatting and pictures may have been removed.  Please reference [employer] for original formatting and attached multimedia. PRE-PROCEDURE H&P  ?  HPI:  The patient denies any change in pain history since their last office visit. No new numbness or weakness reported. No new bowel or bladder incontinence. The patient denies any recent use of anticoagulation. No recent infections or fevers.  ?  PAST MEDICAL AND SURGICAL HISTORY:  Reviewed.  ?  ALLERGIES:  Reviewed in chart and confirmed no allergy to local anesthetic, steroids, or contrast.  ?  MEDICATIONS:  Reviewed.  ?  PHYSICAL EXAM:  General: Not in acute distress.  ? ?? Appearance: Normal appearance. Well-developed.  HEENT:  ? ?? Head: Normocephalic and atraumatic.  ? ?? Eyes: Extraocular movements intact.  Pulmonary:  ? ?Effort: Pulmonary effort is normal. No respiratory distress.  Musculoskeletal: Grossly normal.? ??  ? ?General: No deformity.  Skin:  ? ?General: Skin is warm and dry.  Neurological:  ? ?Mental Status: Alert and oriented x3.  ? ?Sensory: Grossly intact.  ? ?Motor: Moves all 4 extremities to gravity.  Psychiatric:? ?  ? ?Mood and Affect: Mood normal.? ?  ? ?Behavior: Behavior normal.? ?  ? ?Thought Content: Thought content normal.? ?  ? ?Judgment: Judgment normal.  ?  ASSESSMENT:  76-year-old female presents for cervical TAMIKA. ?She has?radicular symptoms. ?She has held blood thinners appropriately. ?No change in medical history since last office visit.? Risks and benefits discussed extensively, especially bleeding risk. ?Patient agrees to proceed.  ?  PLAN:  Proceed with above procedure.  ?

## 2022-05-04 NOTE — HISTORICAL [facility]
This is a historical note converted from [employer]. Formatting and pictures may have been removed.  Please reference [employer] for original formatting and attached multimedia. Type of Procedure: Fluoroscopically Guided Cervical Interlaminar Epidural Steroid Injection  ?  Physician: Diego George III, MD  ?  Diagnosis: Cervical radiculopathy  ?  Description of Procedure:  After appropriate informed consent discussing the risks, benefits and possible complications, the patient was taken to the procedure suite. NIBP, pulse rate, and oxygen saturation were monitored throughout the procedure.?  ?  The patient was positioned prone and prepped and draped in a sterile fashion. The C6-7 interspace was identified fluoroscopically. The skin was anesthetized via 25-gauge 1.5 needle with approximately 3 cc of 1% lidocaine. A 20-gauge Tuohy needle was atraumatically introduced and advanced under fluoroscopic guidance. Using loss of resistance technique with 0.9 % preservative free normal saline the epidural space was entered without difficulties. Following negative aspiration for blood and CSF and confirming the absence of paresthesias, injection of approximately 1.5 cc of Omnipaque 300 demonstrated excellent epidural spread without vascular or intrathecal uptake. At this point, 80mg of DepoMedrol followed by 1ml of PF NS was injected without complication.  ?  The needle was re-styletted and removed. Adhesive dressing was applied over the site. Patient tolerated the procedure well without any apparent complications. The patient was transferred back to the recovery area and then discharged home.

## 2022-05-14 NOTE — H&P
Patient:   Danae Sanchez            MRN: [id number]            FIN: [id number]               Age:   77 years     Sex:  Female     :  1945   Associated Diagnoses:   None   Author:   Doris FARRAR MD, Jake      PRE-PROCEDURE H&P    HPI:  The patient denies any change in pain history since their last office visit. No new numbness or weakness reported. No new bowel or bladder incontinence. The patient denies any recent use of anticoagulation. No recent infections or fevers.    PAST MEDICAL AND SURGICAL HISTORY:  Reviewed.    ALLERGIES:  Reviewed in chart and confirmed no allergy to local anesthetic, steroids, or contrast.     MEDICATIONS:  Reviewed.     PHYSICAL EXAM:  General: Not in acute distress.       Appearance: Normal appearance. Well-developed.  HEENT:       Head: Normocephalic and atraumatic.       Eyes: Extraocular movements intact.  Pulmonary:     Effort: Pulmonary effort is normal. No respiratory distress.  Musculoskeletal: Grossly normal.          General: No deformity.  Skin:     General: Skin is warm and dry.  Neurological:     Mental Status: Alert and oriented x3.      Sensory: Grossly intact.      Motor: Moves all 4 extremities to gravity.   Psychiatric:         Mood and Affect: Mood normal.         Behavior: Behavior normal.         Thought Content: Thought content normal.         Judgment: Judgment normal.    ASSESSMENT:  77-year-old female presents for right C2-4 MB RFA..    PLAN:  Proceed with above procedure.

## 2022-05-14 NOTE — H&P
Patient:   Danae Sanchez            MRN: [id number]            FIN: [id number]               Age:   77 years     Sex:  Female     :  1945   Associated Diagnoses:   None   Author:   Doris FARRAR MD, Jake      PRE-PROCEDURE H&P    HPI:  The patient denies any change in pain history since their last office visit. No new numbness or weakness reported. No new bowel or bladder incontinence. The patient denies any recent use of anticoagulation. No recent infections or fevers.    PAST MEDICAL AND SURGICAL HISTORY:  Reviewed.    ALLERGIES:  Reviewed in chart and confirmed no allergy to local anesthetic, steroids, or contrast.     MEDICATIONS:  Reviewed.     PHYSICAL EXAM:  General: Not in acute distress.       Appearance: Normal appearance. Well-developed.  HEENT:       Head: Normocephalic and atraumatic.       Eyes: Extraocular movements intact.  Pulmonary:     Effort: Pulmonary effort is normal. No respiratory distress.  Musculoskeletal: Grossly normal.          General: No deformity.  Skin:     General: Skin is warm and dry.  Neurological:     Mental Status: Alert and oriented x3.      Sensory: Grossly intact.      Motor: Moves all 4 extremities to gravity.   Psychiatric:         Mood and Affect: Mood normal.         Behavior: Behavior normal.         Thought Content: Thought content normal.         Judgment: Judgment normal.    ASSESSMENT:  77-year-old female presents for bilateral C2-4 MBB #2.    PLAN:  Proceed with above procedure.

## 2022-05-14 NOTE — H&P
Patient:   Danae Sanchez            MRN: [id number]            FIN: [id number]               Age:   77 years     Sex:  Female     :  1945   Associated Diagnoses:   None   Author:   Doris FARRAR MD, Jake      PRE-PROCEDURE H&P    HPI:  The patient denies any change in pain history since their last office visit. No new numbness or weakness reported. No new bowel or bladder incontinence. The patient denies any recent use of anticoagulation. No recent infections or fevers.    PAST MEDICAL AND SURGICAL HISTORY:  Reviewed.    ALLERGIES:  Reviewed in chart and confirmed no allergy to local anesthetic, steroids, or contrast.     MEDICATIONS:  Reviewed.     PHYSICAL EXAM:  General: Not in acute distress.       Appearance: Normal appearance. Well-developed.  HEENT:       Head: Normocephalic and atraumatic.       Eyes: Extraocular movements intact.  Pulmonary:     Effort: Pulmonary effort is normal. No respiratory distress.  Musculoskeletal: Grossly normal.          General: No deformity.  Skin:     General: Skin is warm and dry.  Neurological:     Mental Status: Alert and oriented x3.      Sensory: Grossly intact.      Motor: Moves all 4 extremities to gravity.   Psychiatric:         Mood and Affect: Mood normal.         Behavior: Behavior normal.         Thought Content: Thought content normal.         Judgment: Judgment normal.    ASSESSMENT:  77-year-old female presents for a bilateral C2-4 MBB #1.    PLAN:  Proceed with above procedure.

## 2022-06-24 ENCOUNTER — LAB REQUISITION (OUTPATIENT)
Dept: LAB | Facility: HOSPITAL | Age: 77
End: 2022-06-24
Payer: MEDICARE

## 2022-06-24 DIAGNOSIS — N18.6 END STAGE RENAL DISEASE: ICD-10-CM

## 2022-06-24 DIAGNOSIS — R60.9 EDEMA, UNSPECIFIED: ICD-10-CM

## 2022-06-24 DIAGNOSIS — I95.9 HYPOTENSION, UNSPECIFIED: ICD-10-CM

## 2022-06-24 LAB
ALBUMIN SERPL-MCNC: 3.9 GM/DL (ref 3.4–4.8)
ALBUMIN/GLOB SERPL: 1.4 RATIO (ref 1.1–2)
ALP SERPL-CCNC: 261 UNIT/L (ref 40–150)
ALT SERPL-CCNC: 24 UNIT/L (ref 0–55)
AST SERPL-CCNC: 22 UNIT/L (ref 5–34)
BILIRUBIN DIRECT+TOT PNL SERPL-MCNC: 0.6 MG/DL
BUN SERPL-MCNC: 53 MG/DL (ref 9.8–20.1)
CALCIUM SERPL-MCNC: 9.6 MG/DL (ref 8.4–10.2)
CHLORIDE SERPL-SCNC: 98 MMOL/L (ref 98–107)
CO2 SERPL-SCNC: 29 MMOL/L (ref 23–31)
CREAT SERPL-MCNC: 5.05 MG/DL (ref 0.55–1.02)
ERYTHROCYTE [DISTWIDTH] IN BLOOD BY AUTOMATED COUNT: 14 % (ref 11.5–17)
GLOBULIN SER-MCNC: 2.7 GM/DL (ref 2.4–3.5)
GLUCOSE SERPL-MCNC: 80 MG/DL (ref 82–115)
HCT VFR BLD AUTO: 43.7 % (ref 37–47)
HGB BLD-MCNC: 13.4 GM/DL (ref 12–16)
MCH RBC QN AUTO: 29.1 PG (ref 27–31)
MCHC RBC AUTO-ENTMCNC: 30.7 MG/DL (ref 33–36)
MCV RBC AUTO: 95 FL (ref 80–94)
NRBC BLD AUTO-RTO: 0 %
PLATELET # BLD AUTO: 213 X10(3)/MCL (ref 130–400)
PMV BLD AUTO: 10.7 FL (ref 9.4–12.4)
POTASSIUM SERPL-SCNC: 5 MMOL/L (ref 3.5–5.1)
PROT SERPL-MCNC: 6.6 GM/DL (ref 5.8–7.6)
RBC # BLD AUTO: 4.6 X10(6)/MCL (ref 4.2–5.4)
SODIUM SERPL-SCNC: 142 MMOL/L (ref 136–145)
WBC # SPEC AUTO: 6.9 X10(3)/MCL (ref 4.5–11.5)

## 2022-06-24 PROCEDURE — 80053 COMPREHEN METABOLIC PANEL: CPT | Mod: AY | Performed by: FAMILY MEDICINE

## 2022-06-24 PROCEDURE — 85027 COMPLETE CBC AUTOMATED: CPT | Mod: AY | Performed by: FAMILY MEDICINE

## 2022-08-11 ENCOUNTER — CLINICAL SUPPORT (OUTPATIENT)
Dept: RESPIRATORY THERAPY | Facility: HOSPITAL | Age: 77
End: 2022-08-11
Attending: SURGERY
Payer: MEDICAID

## 2022-08-11 ENCOUNTER — HOSPITAL ENCOUNTER (OUTPATIENT)
Dept: RADIOLOGY | Facility: HOSPITAL | Age: 77
Discharge: HOME | End: 2022-08-11
Attending: SURGERY
Payer: MEDICAID

## 2022-08-11 DIAGNOSIS — T85.590A: ICD-10-CM

## 2022-08-11 DIAGNOSIS — Z01.811 PRE-OP CHEST EXAM: ICD-10-CM

## 2022-08-11 DIAGNOSIS — T85.590A: Primary | ICD-10-CM

## 2022-08-11 PROCEDURE — 93005 ELECTROCARDIOGRAM TRACING: CPT

## 2022-08-11 PROCEDURE — 71046 X-RAY EXAM CHEST 2 VIEWS: CPT | Mod: TC

## 2022-08-27 ENCOUNTER — HOSPITAL ENCOUNTER (INPATIENT)
Facility: HOSPITAL | Age: 77
LOS: 5 days | DRG: 291 | End: 2022-09-01
Attending: INTERNAL MEDICINE | Admitting: FAMILY MEDICINE
Payer: MEDICARE

## 2022-08-27 DIAGNOSIS — I50.9 CHF (CONGESTIVE HEART FAILURE), NYHA CLASS III: ICD-10-CM

## 2022-08-27 DIAGNOSIS — I50.9 ACUTE CONGESTIVE HEART FAILURE, UNSPECIFIED HEART FAILURE TYPE: ICD-10-CM

## 2022-08-27 DIAGNOSIS — R06.02 SHORTNESS OF BREATH: ICD-10-CM

## 2022-08-27 DIAGNOSIS — I48.91 ATRIAL FIBRILLATION WITH RAPID VENTRICULAR RESPONSE: ICD-10-CM

## 2022-08-27 DIAGNOSIS — Z99.2 CHRONIC KIDNEY DISEASE REQUIRING CHRONIC DIALYSIS: ICD-10-CM

## 2022-08-27 DIAGNOSIS — I48.91 RAPID ATRIAL FIBRILLATION: ICD-10-CM

## 2022-08-27 DIAGNOSIS — I50.21 ACUTE SYSTOLIC CONGESTIVE HEART FAILURE: ICD-10-CM

## 2022-08-27 DIAGNOSIS — N18.6 CHRONIC KIDNEY DISEASE REQUIRING CHRONIC DIALYSIS: ICD-10-CM

## 2022-08-27 DIAGNOSIS — I50.9 CONGESTIVE HEART FAILURE, UNSPECIFIED HF CHRONICITY, UNSPECIFIED HEART FAILURE TYPE: Primary | ICD-10-CM

## 2022-08-27 LAB
ALBUMIN SERPL-MCNC: 3.6 GM/DL (ref 3.4–4.8)
ALBUMIN/GLOB SERPL: 1.1 RATIO (ref 1.1–2)
ALP SERPL-CCNC: 264 UNIT/L (ref 40–150)
ALT SERPL-CCNC: 17 UNIT/L (ref 0–55)
AST SERPL-CCNC: 20 UNIT/L (ref 5–34)
BASOPHILS # BLD AUTO: 0.11 X10(3)/MCL (ref 0–0.2)
BASOPHILS NFR BLD AUTO: 1 %
BILIRUBIN DIRECT+TOT PNL SERPL-MCNC: 0.4 MG/DL
BNP BLD-MCNC: 390.4 PG/ML
BUN SERPL-MCNC: 36 MG/DL (ref 9.8–20.1)
CALCIUM SERPL-MCNC: 9.2 MG/DL (ref 8.4–10.2)
CHLORIDE SERPL-SCNC: 102 MMOL/L (ref 98–107)
CO2 SERPL-SCNC: 22 MMOL/L (ref 23–31)
CREAT SERPL-MCNC: 3.3 MG/DL (ref 0.55–1.02)
EOSINOPHIL # BLD AUTO: 0.43 X10(3)/MCL (ref 0–0.9)
EOSINOPHIL NFR BLD AUTO: 3.9 %
ERYTHROCYTE [DISTWIDTH] IN BLOOD BY AUTOMATED COUNT: 14.6 % (ref 11.5–17)
GFR SERPLBLD CREATININE-BSD FMLA CKD-EPI: 14 MLS/MIN/1.73/M2
GLOBULIN SER-MCNC: 3.4 GM/DL (ref 2.4–3.5)
GLUCOSE SERPL-MCNC: 181 MG/DL (ref 82–115)
HCT VFR BLD AUTO: 38.2 % (ref 37–47)
HGB BLD-MCNC: 11.9 GM/DL (ref 12–16)
IMM GRANULOCYTES # BLD AUTO: 0.04 X10(3)/MCL (ref 0–0.04)
IMM GRANULOCYTES NFR BLD AUTO: 0.4 %
LACTATE SERPL-SCNC: 2.4 MMOL/L (ref 0.5–2.2)
LACTATE SERPL-SCNC: 2.4 MMOL/L (ref 0.5–2.2)
LYMPHOCYTES # BLD AUTO: 2.46 X10(3)/MCL (ref 0.6–4.6)
LYMPHOCYTES NFR BLD AUTO: 22.1 %
MCH RBC QN AUTO: 29.5 PG (ref 27–31)
MCHC RBC AUTO-ENTMCNC: 31.2 MG/DL (ref 33–36)
MCV RBC AUTO: 94.8 FL (ref 80–94)
MONOCYTES # BLD AUTO: 1.07 X10(3)/MCL (ref 0.1–1.3)
MONOCYTES NFR BLD AUTO: 9.6 %
NEUTROPHILS # BLD AUTO: 7 X10(3)/MCL (ref 2.1–9.2)
NEUTROPHILS NFR BLD AUTO: 63 %
PLATELET # BLD AUTO: 201 X10(3)/MCL (ref 130–400)
PMV BLD AUTO: 10.2 FL (ref 7.4–10.4)
POTASSIUM SERPL-SCNC: 3.8 MMOL/L (ref 3.5–5.1)
PROT SERPL-MCNC: 7 GM/DL (ref 5.8–7.6)
RBC # BLD AUTO: 4.03 X10(6)/MCL (ref 4.2–5.4)
SARS-COV-2 RDRP RESP QL NAA+PROBE: NEGATIVE
SODIUM SERPL-SCNC: 143 MMOL/L (ref 136–145)
TROPONIN I SERPL-MCNC: <0.01 NG/ML (ref 0–0.04)
WBC # SPEC AUTO: 11.1 X10(3)/MCL (ref 4.5–11.5)

## 2022-08-27 PROCEDURE — 36415 COLL VENOUS BLD VENIPUNCTURE: CPT | Performed by: INTERNAL MEDICINE

## 2022-08-27 PROCEDURE — 21400001 HC TELEMETRY ROOM

## 2022-08-27 PROCEDURE — 11000001 HC ACUTE MED/SURG PRIVATE ROOM

## 2022-08-27 PROCEDURE — 25000003 PHARM REV CODE 250: Performed by: INTERNAL MEDICINE

## 2022-08-27 PROCEDURE — 94660 CPAP INITIATION&MGMT: CPT

## 2022-08-27 PROCEDURE — 87040 BLOOD CULTURE FOR BACTERIA: CPT | Performed by: INTERNAL MEDICINE

## 2022-08-27 PROCEDURE — 27000190 HC CPAP FULL FACE MASK W/VALVE

## 2022-08-27 PROCEDURE — 84484 ASSAY OF TROPONIN QUANT: CPT | Performed by: INTERNAL MEDICINE

## 2022-08-27 PROCEDURE — 94640 AIRWAY INHALATION TREATMENT: CPT

## 2022-08-27 PROCEDURE — 94761 N-INVAS EAR/PLS OXIMETRY MLT: CPT

## 2022-08-27 PROCEDURE — 63600175 PHARM REV CODE 636 W HCPCS: Performed by: INTERNAL MEDICINE

## 2022-08-27 PROCEDURE — 93005 ELECTROCARDIOGRAM TRACING: CPT

## 2022-08-27 PROCEDURE — 27000221 HC OXYGEN, UP TO 24 HOURS

## 2022-08-27 PROCEDURE — 96365 THER/PROPH/DIAG IV INF INIT: CPT

## 2022-08-27 PROCEDURE — 87635 SARS-COV-2 COVID-19 AMP PRB: CPT | Performed by: INTERNAL MEDICINE

## 2022-08-27 PROCEDURE — 99291 CRITICAL CARE FIRST HOUR: CPT | Mod: 25

## 2022-08-27 PROCEDURE — 96375 TX/PRO/DX INJ NEW DRUG ADDON: CPT

## 2022-08-27 PROCEDURE — 83880 ASSAY OF NATRIURETIC PEPTIDE: CPT | Performed by: INTERNAL MEDICINE

## 2022-08-27 PROCEDURE — 99900035 HC TECH TIME PER 15 MIN (STAT)

## 2022-08-27 PROCEDURE — 99900031 HC PATIENT EDUCATION (STAT)

## 2022-08-27 PROCEDURE — 80053 COMPREHEN METABOLIC PANEL: CPT | Performed by: INTERNAL MEDICINE

## 2022-08-27 PROCEDURE — 83605 ASSAY OF LACTIC ACID: CPT | Performed by: INTERNAL MEDICINE

## 2022-08-27 PROCEDURE — 85025 COMPLETE CBC W/AUTO DIFF WBC: CPT | Performed by: INTERNAL MEDICINE

## 2022-08-27 PROCEDURE — 25000242 PHARM REV CODE 250 ALT 637 W/ HCPCS: Performed by: INTERNAL MEDICINE

## 2022-08-27 RX ORDER — AZITHROMYCIN 500 MG/1
500 TABLET, FILM COATED ORAL DAILY
Status: ON HOLD | COMMUNITY
Start: 2022-08-27 | End: 2022-09-01 | Stop reason: SDUPTHER

## 2022-08-27 RX ORDER — FUROSEMIDE 10 MG/ML
40 INJECTION INTRAMUSCULAR; INTRAVENOUS ONCE
Status: COMPLETED | OUTPATIENT
Start: 2022-08-27 | End: 2022-08-27

## 2022-08-27 RX ORDER — IPRATROPIUM BROMIDE AND ALBUTEROL SULFATE 2.5; .5 MG/3ML; MG/3ML
3 SOLUTION RESPIRATORY (INHALATION)
Status: COMPLETED | OUTPATIENT
Start: 2022-08-27 | End: 2022-08-27

## 2022-08-27 RX ORDER — ZALEPLON 10 MG/1
10 CAPSULE ORAL NIGHTLY
COMMUNITY
Start: 2022-07-29

## 2022-08-27 RX ORDER — AZITHROMYCIN 250 MG/1
500 TABLET, FILM COATED ORAL DAILY
Status: DISCONTINUED | OUTPATIENT
Start: 2022-08-28 | End: 2022-09-01 | Stop reason: HOSPADM

## 2022-08-27 RX ORDER — GUAIFENESIN 1200 MG
650 TABLET, EXTENDED RELEASE 12 HR ORAL
Status: ON HOLD | COMMUNITY
Start: 2022-02-07 | End: 2022-09-01 | Stop reason: HOSPADM

## 2022-08-27 RX ORDER — MELATONIN 3 MG
1 CAPSULE ORAL NIGHTLY
Status: ON HOLD | COMMUNITY
End: 2022-08-28 | Stop reason: CLARIF

## 2022-08-27 RX ORDER — LORATADINE 10 MG/1
10 TABLET ORAL DAILY
COMMUNITY
Start: 2021-11-30

## 2022-08-27 RX ORDER — PANTOPRAZOLE SODIUM 40 MG/1
40 TABLET, DELAYED RELEASE ORAL DAILY
COMMUNITY
Start: 2022-08-23

## 2022-08-27 RX ORDER — SODIUM CHLORIDE 0.9 % (FLUSH) 0.9 %
10 SYRINGE (ML) INJECTION EVERY 8 HOURS
Status: DISCONTINUED | OUTPATIENT
Start: 2022-08-28 | End: 2022-09-01 | Stop reason: HOSPADM

## 2022-08-27 RX ORDER — ACETAMINOPHEN 325 MG/1
650 TABLET ORAL EVERY 8 HOURS PRN
Status: DISCONTINUED | OUTPATIENT
Start: 2022-08-27 | End: 2022-08-28

## 2022-08-27 RX ORDER — APIXABAN 2.5 MG/1
2.5 TABLET, FILM COATED ORAL 2 TIMES DAILY
Status: ON HOLD | COMMUNITY
Start: 2022-08-05 | End: 2024-01-20 | Stop reason: HOSPADM

## 2022-08-27 RX ORDER — ROPINIROLE 0.5 MG/1
0.5 TABLET, FILM COATED ORAL NIGHTLY
Status: ON HOLD | COMMUNITY
Start: 2022-08-08 | End: 2023-08-02 | Stop reason: HOSPADM

## 2022-08-27 RX ORDER — TORSEMIDE 20 MG/1
40 TABLET ORAL DAILY
Status: ON HOLD | COMMUNITY
Start: 2022-07-20 | End: 2024-01-20 | Stop reason: HOSPADM

## 2022-08-27 RX ORDER — SODIUM FLUORIDE 5 MG/ML
PASTE, DENTIFRICE DENTAL
Status: ON HOLD | COMMUNITY
Start: 2022-06-11 | End: 2023-08-21

## 2022-08-27 RX ORDER — DILTIAZEM HYDROCHLORIDE 30 MG/1
30 TABLET, FILM COATED ORAL EVERY 8 HOURS
Status: DISCONTINUED | OUTPATIENT
Start: 2022-08-28 | End: 2022-08-28

## 2022-08-27 RX ORDER — DILTIAZEM HYDROCHLORIDE 30 MG/1
30 TABLET, FILM COATED ORAL
Status: COMPLETED | OUTPATIENT
Start: 2022-08-27 | End: 2022-08-27

## 2022-08-27 RX ORDER — ATORVASTATIN CALCIUM 40 MG/1
40 TABLET, FILM COATED ORAL DAILY
Status: ON HOLD | COMMUNITY
Start: 2022-08-23 | End: 2022-08-28 | Stop reason: CLARIF

## 2022-08-27 RX ORDER — LEVOTHYROXINE SODIUM 200 UG/1
200 TABLET ORAL DAILY
COMMUNITY
Start: 2022-08-15

## 2022-08-27 RX ADMIN — DEXTROSE MONOHYDRATE 1 G: 5 INJECTION INTRAVENOUS at 09:08

## 2022-08-27 RX ADMIN — DILTIAZEM HYDROCHLORIDE 30 MG: 30 TABLET, FILM COATED ORAL at 09:08

## 2022-08-27 RX ADMIN — FUROSEMIDE 40 MG: 10 INJECTION, SOLUTION INTRAMUSCULAR; INTRAVENOUS at 08:08

## 2022-08-27 RX ADMIN — IPRATROPIUM BROMIDE AND ALBUTEROL SULFATE 3 ML: 2.5; .5 SOLUTION RESPIRATORY (INHALATION) at 08:08

## 2022-08-28 LAB
ALBUMIN SERPL-MCNC: 3.4 GM/DL (ref 3.4–4.8)
ALBUMIN/GLOB SERPL: 1.4 RATIO (ref 1.1–2)
ALP SERPL-CCNC: 227 UNIT/L (ref 40–150)
ALT SERPL-CCNC: 15 UNIT/L (ref 0–55)
AST SERPL-CCNC: 19 UNIT/L (ref 5–34)
BASOPHILS # BLD AUTO: 0.09 X10(3)/MCL (ref 0–0.2)
BASOPHILS NFR BLD AUTO: 0.9 %
BILIRUBIN DIRECT+TOT PNL SERPL-MCNC: 0.5 MG/DL
BUN SERPL-MCNC: 40 MG/DL (ref 9.8–20.1)
CALCIUM SERPL-MCNC: 8.7 MG/DL (ref 8.4–10.2)
CHLORIDE SERPL-SCNC: 104 MMOL/L (ref 98–107)
CO2 SERPL-SCNC: 25 MMOL/L (ref 23–31)
CREAT SERPL-MCNC: 3.63 MG/DL (ref 0.55–1.02)
EOSINOPHIL # BLD AUTO: 0.41 X10(3)/MCL (ref 0–0.9)
EOSINOPHIL NFR BLD AUTO: 4.1 %
ERYTHROCYTE [DISTWIDTH] IN BLOOD BY AUTOMATED COUNT: 14.4 % (ref 11.5–17)
GFR SERPLBLD CREATININE-BSD FMLA CKD-EPI: 13 MLS/MIN/1.73/M2
GLOBULIN SER-MCNC: 2.5 GM/DL (ref 2.4–3.5)
GLUCOSE SERPL-MCNC: 131 MG/DL (ref 82–115)
HCT VFR BLD AUTO: 34.2 % (ref 37–47)
HGB BLD-MCNC: 10.7 GM/DL (ref 12–16)
IMM GRANULOCYTES # BLD AUTO: 0.03 X10(3)/MCL (ref 0–0.04)
IMM GRANULOCYTES NFR BLD AUTO: 0.3 %
LACTATE SERPL-SCNC: 0.8 MMOL/L (ref 0.5–2.2)
LACTATE SERPL-SCNC: 2.2 MMOL/L (ref 0.5–2.2)
LACTATE SERPL-SCNC: 2.5 MMOL/L (ref 0.5–2.2)
LYMPHOCYTES # BLD AUTO: 1.86 X10(3)/MCL (ref 0.6–4.6)
LYMPHOCYTES NFR BLD AUTO: 18.7 %
MCH RBC QN AUTO: 29.4 PG (ref 27–31)
MCHC RBC AUTO-ENTMCNC: 31.3 MG/DL (ref 33–36)
MCV RBC AUTO: 94 FL (ref 80–94)
MONOCYTES # BLD AUTO: 1.06 X10(3)/MCL (ref 0.1–1.3)
MONOCYTES NFR BLD AUTO: 10.7 %
NEUTROPHILS # BLD AUTO: 6.5 X10(3)/MCL (ref 2.1–9.2)
NEUTROPHILS NFR BLD AUTO: 65.3 %
PLATELET # BLD AUTO: 185 X10(3)/MCL (ref 130–400)
PMV BLD AUTO: 10.2 FL (ref 7.4–10.4)
POCT GLUCOSE: 131 MG/DL (ref 70–110)
POCT GLUCOSE: 137 MG/DL (ref 70–110)
POTASSIUM SERPL-SCNC: 4.1 MMOL/L (ref 3.5–5.1)
PROT SERPL-MCNC: 5.9 GM/DL (ref 5.8–7.6)
RBC # BLD AUTO: 3.64 X10(6)/MCL (ref 4.2–5.4)
SODIUM SERPL-SCNC: 143 MMOL/L (ref 136–145)
TSH SERPL-ACNC: 1.84 UIU/ML (ref 0.35–4.94)
WBC # SPEC AUTO: 10 X10(3)/MCL (ref 4.5–11.5)

## 2022-08-28 PROCEDURE — 21400001 HC TELEMETRY ROOM

## 2022-08-28 PROCEDURE — 84443 ASSAY THYROID STIM HORMONE: CPT | Performed by: FAMILY MEDICINE

## 2022-08-28 PROCEDURE — 80053 COMPREHEN METABOLIC PANEL: CPT | Performed by: INTERNAL MEDICINE

## 2022-08-28 PROCEDURE — 25000242 PHARM REV CODE 250 ALT 637 W/ HCPCS: Performed by: FAMILY MEDICINE

## 2022-08-28 PROCEDURE — 94640 AIRWAY INHALATION TREATMENT: CPT

## 2022-08-28 PROCEDURE — 36415 COLL VENOUS BLD VENIPUNCTURE: CPT | Performed by: INTERNAL MEDICINE

## 2022-08-28 PROCEDURE — 27000221 HC OXYGEN, UP TO 24 HOURS

## 2022-08-28 PROCEDURE — 63600175 PHARM REV CODE 636 W HCPCS: Performed by: INTERNAL MEDICINE

## 2022-08-28 PROCEDURE — 11000001 HC ACUTE MED/SURG PRIVATE ROOM

## 2022-08-28 PROCEDURE — 99900035 HC TECH TIME PER 15 MIN (STAT)

## 2022-08-28 PROCEDURE — 83605 ASSAY OF LACTIC ACID: CPT | Performed by: INTERNAL MEDICINE

## 2022-08-28 PROCEDURE — 25000003 PHARM REV CODE 250: Performed by: INTERNAL MEDICINE

## 2022-08-28 PROCEDURE — 85025 COMPLETE CBC W/AUTO DIFF WBC: CPT | Performed by: INTERNAL MEDICINE

## 2022-08-28 PROCEDURE — 94761 N-INVAS EAR/PLS OXIMETRY MLT: CPT

## 2022-08-28 PROCEDURE — 63700000 PHARM REV CODE 250 ALT 637 W/O HCPCS: Performed by: INTERNAL MEDICINE

## 2022-08-28 PROCEDURE — 25000003 PHARM REV CODE 250: Performed by: FAMILY MEDICINE

## 2022-08-28 PROCEDURE — A4216 STERILE WATER/SALINE, 10 ML: HCPCS | Performed by: INTERNAL MEDICINE

## 2022-08-28 PROCEDURE — 63600175 PHARM REV CODE 636 W HCPCS: Performed by: FAMILY MEDICINE

## 2022-08-28 RX ORDER — GABAPENTIN 100 MG/1
100 CAPSULE ORAL 3 TIMES DAILY
Status: DISCONTINUED | OUTPATIENT
Start: 2022-08-28 | End: 2022-09-01 | Stop reason: HOSPADM

## 2022-08-28 RX ORDER — IBUPROFEN 200 MG
24 TABLET ORAL
Status: DISCONTINUED | OUTPATIENT
Start: 2022-08-28 | End: 2022-09-01 | Stop reason: HOSPADM

## 2022-08-28 RX ORDER — ROPINIROLE 0.5 MG/1
1 TABLET, FILM COATED ORAL DAILY
Status: ON HOLD | COMMUNITY
Start: 2021-11-30 | End: 2023-08-02 | Stop reason: HOSPADM

## 2022-08-28 RX ORDER — SODIUM CHLORIDE 0.9 % (FLUSH) 0.9 %
10 SYRINGE (ML) INJECTION EVERY 12 HOURS PRN
Status: DISCONTINUED | OUTPATIENT
Start: 2022-08-28 | End: 2022-09-01 | Stop reason: HOSPADM

## 2022-08-28 RX ORDER — MULTIVITAMIN
1 TABLET ORAL DAILY
COMMUNITY

## 2022-08-28 RX ORDER — DILTIAZEM HYDROCHLORIDE 30 MG/1
30 TABLET, FILM COATED ORAL EVERY 6 HOURS
Status: DISCONTINUED | OUTPATIENT
Start: 2022-08-28 | End: 2022-09-01 | Stop reason: HOSPADM

## 2022-08-28 RX ORDER — TORSEMIDE 20 MG/1
20 TABLET ORAL DAILY
Status: DISCONTINUED | OUTPATIENT
Start: 2022-08-28 | End: 2022-09-01 | Stop reason: HOSPADM

## 2022-08-28 RX ORDER — IBUPROFEN 200 MG
16 TABLET ORAL
Status: DISCONTINUED | OUTPATIENT
Start: 2022-08-28 | End: 2022-09-01 | Stop reason: HOSPADM

## 2022-08-28 RX ORDER — CEFTRIAXONE 1 G/1
1 INJECTION, POWDER, FOR SOLUTION INTRAMUSCULAR; INTRAVENOUS DAILY
Status: DISCONTINUED | OUTPATIENT
Start: 2022-08-28 | End: 2022-08-28

## 2022-08-28 RX ORDER — LACTOBACILLUS ACIDOPHILUS 500MM CELL
1 CAPSULE ORAL DAILY
COMMUNITY
Start: 2022-02-07

## 2022-08-28 RX ORDER — ATORVASTATIN CALCIUM 40 MG/1
40 TABLET, FILM COATED ORAL DAILY
Status: DISCONTINUED | OUTPATIENT
Start: 2022-08-28 | End: 2022-09-01 | Stop reason: HOSPADM

## 2022-08-28 RX ORDER — ACETAMINOPHEN 500 MG
500 TABLET ORAL EVERY 6 HOURS PRN
Status: DISCONTINUED | OUTPATIENT
Start: 2022-08-28 | End: 2022-09-01 | Stop reason: HOSPADM

## 2022-08-28 RX ORDER — CARVEDILOL 3.12 MG/1
3.12 TABLET ORAL 2 TIMES DAILY
Status: DISCONTINUED | OUTPATIENT
Start: 2022-08-28 | End: 2022-08-31

## 2022-08-28 RX ORDER — GLUCAGON 1 MG
1 KIT INJECTION
Status: DISCONTINUED | OUTPATIENT
Start: 2022-08-28 | End: 2022-09-01 | Stop reason: HOSPADM

## 2022-08-28 RX ORDER — ROPINIROLE 0.25 MG/1
0.5 TABLET, FILM COATED ORAL NIGHTLY
Status: DISCONTINUED | OUTPATIENT
Start: 2022-08-28 | End: 2022-09-01 | Stop reason: HOSPADM

## 2022-08-28 RX ORDER — ZOLPIDEM TARTRATE 5 MG/1
5 TABLET ORAL NIGHTLY
Status: DISCONTINUED | OUTPATIENT
Start: 2022-08-28 | End: 2022-09-01 | Stop reason: HOSPADM

## 2022-08-28 RX ORDER — CEFTRIAXONE 1 G/1
1 INJECTION, POWDER, FOR SOLUTION INTRAMUSCULAR; INTRAVENOUS DAILY
Status: ON HOLD | COMMUNITY
Start: 2022-08-27 | End: 2022-09-01 | Stop reason: HOSPADM

## 2022-08-28 RX ORDER — BUDESONIDE 0.5 MG/2ML
0.5 INHALANT ORAL EVERY 12 HOURS
Status: DISCONTINUED | OUTPATIENT
Start: 2022-08-28 | End: 2022-09-01 | Stop reason: HOSPADM

## 2022-08-28 RX ORDER — IPRATROPIUM BROMIDE AND ALBUTEROL SULFATE 2.5; .5 MG/3ML; MG/3ML
3 SOLUTION RESPIRATORY (INHALATION)
Status: DISCONTINUED | OUTPATIENT
Start: 2022-08-28 | End: 2022-09-01 | Stop reason: HOSPADM

## 2022-08-28 RX ORDER — TALC
6 POWDER (GRAM) TOPICAL DAILY
Status: ON HOLD | COMMUNITY
Start: 2021-11-30 | End: 2023-08-02 | Stop reason: HOSPADM

## 2022-08-28 RX ORDER — FLUOXETINE 10 MG/1
40 CAPSULE ORAL DAILY
Status: DISCONTINUED | OUTPATIENT
Start: 2022-08-28 | End: 2022-09-01 | Stop reason: HOSPADM

## 2022-08-28 RX ORDER — ZALEPLON 10 MG/1
10 CAPSULE ORAL NIGHTLY
Status: ON HOLD | COMMUNITY
End: 2022-09-01 | Stop reason: HOSPADM

## 2022-08-28 RX ORDER — AZITHROMYCIN 250 MG/1
500 TABLET, FILM COATED ORAL DAILY
Status: DISCONTINUED | OUTPATIENT
Start: 2022-08-28 | End: 2022-08-28

## 2022-08-28 RX ORDER — ROPINIROLE 1 MG/1
1 TABLET, FILM COATED ORAL DAILY
Status: DISCONTINUED | OUTPATIENT
Start: 2022-08-28 | End: 2022-09-01 | Stop reason: HOSPADM

## 2022-08-28 RX ORDER — LEVOTHYROXINE SODIUM 100 UG/1
200 TABLET ORAL DAILY
Status: DISCONTINUED | OUTPATIENT
Start: 2022-08-28 | End: 2022-09-01 | Stop reason: HOSPADM

## 2022-08-28 RX ORDER — PANTOPRAZOLE SODIUM 40 MG/1
40 TABLET, DELAYED RELEASE ORAL EVERY MORNING
Status: ON HOLD | COMMUNITY
End: 2022-08-28 | Stop reason: CLARIF

## 2022-08-28 RX ORDER — ATORVASTATIN CALCIUM 40 MG/1
40 TABLET, FILM COATED ORAL DAILY
COMMUNITY
Start: 2021-11-30

## 2022-08-28 RX ORDER — FUROSEMIDE 10 MG/ML
60 INJECTION INTRAMUSCULAR; INTRAVENOUS ONCE
Status: COMPLETED | OUTPATIENT
Start: 2022-08-28 | End: 2022-08-28

## 2022-08-28 RX ORDER — METOLAZONE 2.5 MG/1
5 TABLET ORAL DAILY
Status: DISCONTINUED | OUTPATIENT
Start: 2022-08-28 | End: 2022-09-01 | Stop reason: HOSPADM

## 2022-08-28 RX ORDER — INSULIN HUMAN 100 [IU]/ML
15 INJECTION, SUSPENSION SUBCUTANEOUS 2 TIMES DAILY
COMMUNITY
Start: 2022-08-27

## 2022-08-28 RX ORDER — FLUTICASONE PROPIONATE 50 MCG
2 SPRAY, SUSPENSION (ML) NASAL DAILY
Status: DISCONTINUED | OUTPATIENT
Start: 2022-08-28 | End: 2022-09-01 | Stop reason: HOSPADM

## 2022-08-28 RX ORDER — FLUOXETINE HYDROCHLORIDE 40 MG/1
80 CAPSULE ORAL DAILY
Status: ON HOLD | COMMUNITY
Start: 2022-08-12 | End: 2023-08-22 | Stop reason: HOSPADM

## 2022-08-28 RX ORDER — LEVOTHYROXINE SODIUM 200 UG/1
200 TABLET ORAL DAILY
Status: ON HOLD | COMMUNITY
Start: 2021-11-30 | End: 2022-08-28 | Stop reason: CLARIF

## 2022-08-28 RX ORDER — TORSEMIDE 20 MG/1
20 TABLET ORAL DAILY
Status: ON HOLD | COMMUNITY
Start: 2021-11-30 | End: 2022-09-01 | Stop reason: HOSPADM

## 2022-08-28 RX ORDER — PANTOPRAZOLE SODIUM 40 MG/1
40 TABLET, DELAYED RELEASE ORAL DAILY
Status: DISCONTINUED | OUTPATIENT
Start: 2022-08-28 | End: 2022-09-01 | Stop reason: HOSPADM

## 2022-08-28 RX ADMIN — LACTOBACILLUS TAB 1 TABLET: TAB at 07:08

## 2022-08-28 RX ADMIN — SODIUM CHLORIDE, PRESERVATIVE FREE 10 ML: 5 INJECTION INTRAVENOUS at 02:08

## 2022-08-28 RX ADMIN — ATORVASTATIN CALCIUM 40 MG: 40 TABLET, FILM COATED ORAL at 04:08

## 2022-08-28 RX ADMIN — GABAPENTIN 100 MG: 100 CAPSULE ORAL at 04:08

## 2022-08-28 RX ADMIN — CARVEDILOL 3.12 MG: 3.12 TABLET, FILM COATED ORAL at 09:08

## 2022-08-28 RX ADMIN — DILTIAZEM HYDROCHLORIDE 30 MG: 30 TABLET, FILM COATED ORAL at 07:08

## 2022-08-28 RX ADMIN — FLUOXETINE 40 MG: 10 CAPSULE ORAL at 01:08

## 2022-08-28 RX ADMIN — DILTIAZEM HYDROCHLORIDE 30 MG: 30 TABLET, FILM COATED ORAL at 05:08

## 2022-08-28 RX ADMIN — FUROSEMIDE 60 MG: 10 INJECTION INTRAMUSCULAR; INTRAVENOUS at 04:08

## 2022-08-28 RX ADMIN — LEVOTHYROXINE SODIUM 200 MCG: 0.1 TABLET ORAL at 01:08

## 2022-08-28 RX ADMIN — AZITHROMYCIN MONOHYDRATE 500 MG: 250 TABLET ORAL at 08:08

## 2022-08-28 RX ADMIN — DILTIAZEM HYDROCHLORIDE 30 MG: 30 TABLET, FILM COATED ORAL at 11:08

## 2022-08-28 RX ADMIN — ZOLPIDEM TARTRATE 5 MG: 5 TABLET ORAL at 09:08

## 2022-08-28 RX ADMIN — APIXABAN 5 MG: 5 TABLET, FILM COATED ORAL at 09:08

## 2022-08-28 RX ADMIN — GABAPENTIN 100 MG: 100 CAPSULE ORAL at 09:08

## 2022-08-28 RX ADMIN — ROPINIROLE HYDROCHLORIDE 1 MG: 1 TABLET, FILM COATED ORAL at 04:08

## 2022-08-28 RX ADMIN — INSULIN DETEMIR 20 UNITS: 100 INJECTION, SOLUTION SUBCUTANEOUS at 09:08

## 2022-08-28 RX ADMIN — METOLAZONE 5 MG: 2.5 TABLET ORAL at 04:08

## 2022-08-28 RX ADMIN — DEXTROSE MONOHYDRATE 1 G: 5 INJECTION INTRAVENOUS at 09:08

## 2022-08-28 RX ADMIN — ROPINIROLE 0.5 MG: 0.25 TABLET, FILM COATED ORAL at 09:08

## 2022-08-28 RX ADMIN — PANTOPRAZOLE SODIUM 40 MG: 40 TABLET, DELAYED RELEASE ORAL at 01:08

## 2022-08-28 RX ADMIN — SODIUM CHLORIDE, PRESERVATIVE FREE 10 ML: 5 INJECTION INTRAVENOUS at 10:08

## 2022-08-28 RX ADMIN — BUDESONIDE INHALATION 0.5 MG: 0.5 SUSPENSION RESPIRATORY (INHALATION) at 07:08

## 2022-08-28 RX ADMIN — ACETAMINOPHEN 325MG 650 MG: 325 TABLET ORAL at 09:08

## 2022-08-28 RX ADMIN — IPRATROPIUM BROMIDE AND ALBUTEROL SULFATE 3 ML: 2.5; .5 SOLUTION RESPIRATORY (INHALATION) at 04:08

## 2022-08-28 RX ADMIN — IPRATROPIUM BROMIDE AND ALBUTEROL SULFATE 3 ML: 2.5; .5 SOLUTION RESPIRATORY (INHALATION) at 07:08

## 2022-08-28 RX ADMIN — ACETAMINOPHEN 500 MG: 500 TABLET, FILM COATED ORAL at 10:08

## 2022-08-28 RX ADMIN — SODIUM CHLORIDE, PRESERVATIVE FREE 10 ML: 5 INJECTION INTRAVENOUS at 05:08

## 2022-08-28 RX ADMIN — TORSEMIDE 20 MG: 20 TABLET ORAL at 01:08

## 2022-08-28 NOTE — ED PROVIDER NOTES
08/27/2022         9:32 PM    Source of History:  History obtained from the patient.       Chief complaint:  From Nurse Triage:  Shortness of Breath (Pt BIBA from [facility] for SOB, pt states diagnosed with PNA recently)    HPI:  Danae Sanchez is a 77 y.o. female presenting with Shortness of Breath (Pt BIBA from [facility] for SOB, pt states diagnosed with PNA recently)       Shortness of breath going on for 24 hours or so, she had a chest x-ray done today which was reported to have pneumonia and the lower lobes, she was started on Rocephin and Zithromax, in the evening she got more short of breath and according to the nurses her oxygen saturation was 70% so they called the ambulance.  Medics says that they were able to take the oxygen off and her O2 sat was a 90s but she is definitely tachypneic and wheezing.  So they brought her to the emergency room on oxygen.  Patient is able to maintain her oxygen up to 100% but she has to be breathing fast for that.    Review of Systems   Constitutional symptoms:  No Fever. No Chills    Skin symptoms:  No Rash.    Eye symptoms:  No Visual disturbance reported.   ENMT symptoms:  No Sore throat,    Respiratory symptoms:  No Shortness of Breath, no Cough, no Wheezing.    Cardiovascular symptoms:  No Chest Pain, No Palpitations, No Tachycardia.    Gastrointestinal symptoms:  No Abdominal Pain, No Nausea, No Vomiting, No Diarrhea, No Constipation.    Genitourinary symptoms:  No Dysuria,    Musculoskeletal symptoms:  No Back pain,    Neurologic symptoms:  No Headache, No Dizziness.    Psychiatric symptoms:  No Anxiety, No Depression, No Substance Abuse.              Additional review of systems information: Patient Denies Any Other Complaints.  All Other Systems Reviewed With Patient And Negative.    ALLEGIES:  Review of patient's allergies indicates:   Allergen Reactions    Codeine Nausea And Vomiting    Cortisone Nausea Only and Nausea And Vomiting     only allergic to oral  only  allergic to oral      Lisinopril Nausea And Vomiting and Rash    Morphine Itching       HOME MEDICINES:    Current Facility-Administered Medications:     cefTRIAXone (ROCEPHIN) 1 g in dextrose 5 % in water (D5W) 5 % 50 mL IVPB (MB+), 1 g, Intravenous, Q24H, Hamilton Bautista MD, Stopped at 08/27/22 9035    Current Outpatient Medications:     acetaminophen 325 mg Cap, Take 650 mg by mouth., Disp: , Rfl:     loratadine (CLARITIN) 10 mg tablet, Take 10 mg by mouth., Disp: , Rfl:     atorvastatin (LIPITOR) 40 MG tablet, Take 40 mg by mouth once daily., Disp: , Rfl:     azithromycin (ZITHROMAX) 500 MG tablet, Take 500 mg by mouth once daily., Disp: , Rfl:     ELIQUIS 2.5 mg Tab, Take 2.5 mg by mouth 2 (two) times daily., Disp: , Rfl:     levothyroxine (SYNTHROID) 200 MCG tablet, Take 200 mcg by mouth once daily., Disp: , Rfl:     melatonin 3 mg Cap, Take 1 capsule by mouth every evening., Disp: , Rfl:     pantoprazole (PROTONIX) 40 MG tablet, Take 40 mg by mouth once daily., Disp: , Rfl:     PREVIDENT 5000 PLUS 1.1 % Crea, , Disp: , Rfl:     rOPINIRole (REQUIP) 0.5 MG tablet, Take 0.5 mg by mouth every evening., Disp: , Rfl:     torsemide (DEMADEX) 20 MG Tab, Take 20 mg by mouth once daily., Disp: , Rfl:     zaleplon (SONATA) 10 MG capsule, Take 10 mg by mouth nightly as needed., Disp: , Rfl:     PMH:  As per HPI and below:    Reviewed and updated in chart.    PAST MEDICAL HISTORY:  Past Medical History:   Diagnosis Date    Anxiety disorder, unspecified     Chronic kidney disease, unspecified     Congestive heart failure (CHF)     Depression     Diabetes mellitus     GERD (gastroesophageal reflux disease)     History of anemia due to chronic kidney disease     Hypercholesterolemia     Hypertension     Hypothyroidism, unspecified     Parkinsonism     Paroxysmal atrial fibrillation         PAST SURGICAL HISTORY:  Past Surgical History:   Procedure Laterality Date    CHOLECYSTECTOMY      Dialysis shunt Left     HYSTERECTOMY          SOCIAL HISTORY:  Social History     Tobacco Use    Smoking status: Never     Passive exposure: Past    Smokeless tobacco: Never   Substance Use Topics    Alcohol use: Not Currently    Drug use: Never       FAMILY HISTORY:  Family History   Problem Relation Age of Onset    Hypertension Mother     Heart disease Mother     Hypertension Father     Heart disease Father     Heart disease Sister     Hypertension Sister         PROBLEM LIST:  Patient Active Problem List   Diagnosis    Chronic kidney disease requiring chronic dialysis          PHYSICAL EXAM:      ED Triage Vitals [08/27/22 2005]   /83   Pulse (!) 129   Resp (!) 27   Temp 98.6 °F (37 °C)   SpO2 99 %        Vital Signs: Reviewed As In Chart.  General:  Alert, tachypneic, wheezing   Skin: Normal For Ethnic Origin  Eye:  Extraocular Movements Are Intact.   Cardiovascular:  Regular Rate And Rhythm, No Murmur, No Pedal Edema.    Respiratory:  Respirations labored, mild Respiratory Distress, decreased Bilateral Air Entry, No Rales, Rhonchi bilateral diffuse.    Musculoskeletal:  No Gross Deformity Noted.   Gastrointestinal:  Soft, Non Distended, Non Tender, Normal Bowel Sounds.    Neurological:  Alert And Oriented To Person, Place, Time, And Situation, Normal Motor Observed, Normal Speech Observed.    Psychiatric:  Cooperative, Appropriate Mood & Affect.    INITIAL IMPRESSION/ DIFFERENTIAL DX:      MEDICAL DECISION MAKING:      Reviewed Nurses Note. Reviewed Vital Signs.     Reviewed Pertinent old records, History and updated as necessary.    77 y.o. female with Shortness of Breath (Pt BIBA from [facility] for SOB, pt states diagnosed with PNA recently)    Patient arrived in tachypnea tachycardic and the short of breath, decided to give her neb treatment also Lasix 40 mg IV push, she has the congestive heart failure, atrial fibrillation, she is in AFib with rapid ventricular response,    ED WORKUP AND COURSE:  ED ORDERS:  Orders Placed This Encounter    Procedures    Critical Care    Blood Culture #1    Blood Culture #2    X-Ray Chest 1 View    CBC Auto Differential    Comprehensive Metabolic Panel    Lactic Acid, Plasma    Troponin I    CBC with Differential    BNP    Lactic Acid, Plasma    COVID-19 Rapid Screening    Lactic Acid, Plasma    Cardiac Monitoring - Adult    Pulse Oximetry Continuous    Oxygen Continuous    EKG 12-lead    Admit to Inpatient       Medications   cefTRIAXone (ROCEPHIN) 1 g in dextrose 5 % in water (D5W) 5 % 50 mL IVPB (MB+) (0 g Intravenous Stopped 8/27/22 2159)   furosemide injection 40 mg (40 mg Intravenous Given 8/27/22 2014)   albuterol-ipratropium 2.5 mg-0.5 mg/3 mL nebulizer solution 3 mL (3 mLs Nebulization Given 8/27/22 2020)   diltiaZEM tablet 30 mg (30 mg Oral Given 8/27/22 2129)            ECG Results              EKG 12-lead (Preliminary result)  Result time 08/27/22 22:53:58      ED Interpretation by Hamilton Bautista MD (08/27/22 22:53:58, [facility] - Emergency Dept, Emergency Medicine)    EKG: Interpreted by Hamilton Bautista MD. independently as Atrial fibrillation, rate 105, left anterior fascicular block, atrial fibrillation with rapid ventricular response                                      ED LABS ORDERED AND REVIEWED:  Admission on 08/27/2022   Component Date Value Ref Range Status    Sodium Level 08/27/2022 143  136 - 145 mmol/L Final    Potassium Level 08/27/2022 3.8  3.5 - 5.1 mmol/L Final    Chloride 08/27/2022 102  98 - 107 mmol/L Final    Carbon Dioxide 08/27/2022 22 (L)  23 - 31 mmol/L Final    Glucose Level 08/27/2022 181 (H)  82 - 115 mg/dL Final    Blood Urea Nitrogen 08/27/2022 36.0 (H)  9.8 - 20.1 mg/dL Final    Creatinine 08/27/2022 3.30 (H)  0.55 - 1.02 mg/dL Final    Calcium Level Total 08/27/2022 9.2  8.4 - 10.2 mg/dL Final    Protein Total 08/27/2022 7.0  5.8 - 7.6 gm/dL Final    Albumin Level 08/27/2022 3.6  3.4 - 4.8 gm/dL Final    Globulin 08/27/2022 3.4  2.4 - 3.5 gm/dL Final     Albumin/Globulin Ratio 08/27/2022 1.1  1.1 - 2.0 ratio Final    Bilirubin Total 08/27/2022 0.4  <=1.5 mg/dL Final    Alkaline Phosphatase 08/27/2022 264 (H)  40 - 150 unit/L Final    Alanine Aminotransferase 08/27/2022 17  0 - 55 unit/L Final    Aspartate Aminotransferase 08/27/2022 20  5 - 34 unit/L Final    eGFR 08/27/2022 14  mls/min/1.73/m2 Final    Lactic Acid Level 08/27/2022 2.4 (H)  0.5 - 2.2 mmol/L Final    Troponin-I 08/27/2022 <0.010  0.000 - 0.045 ng/mL Final    WBC 08/27/2022 11.1  4.5 - 11.5 x10(3)/mcL Final    RBC 08/27/2022 4.03 (L)  4.20 - 5.40 x10(6)/mcL Final    Hgb 08/27/2022 11.9 (L)  12.0 - 16.0 gm/dL Final    Hct 08/27/2022 38.2  37.0 - 47.0 % Final    MCV 08/27/2022 94.8 (H)  80.0 - 94.0 fL Final    MCH 08/27/2022 29.5  27.0 - 31.0 pg Final    MCHC 08/27/2022 31.2 (L)  33.0 - 36.0 mg/dL Final    RDW 08/27/2022 14.6  11.5 - 17.0 % Final    Platelet 08/27/2022 201  130 - 400 x10(3)/mcL Final    MPV 08/27/2022 10.2  7.4 - 10.4 fL Final    Neut % 08/27/2022 63.0  % Final    Lymph % 08/27/2022 22.1  % Final    Mono % 08/27/2022 9.6  % Final    Eos % 08/27/2022 3.9  % Final    Basophil % 08/27/2022 1.0  % Final    Lymph # 08/27/2022 2.46  0.6 - 4.6 x10(3)/mcL Final    Neut # 08/27/2022 7.0  2.1 - 9.2 x10(3)/mcL Final    Mono # 08/27/2022 1.07  0.1 - 1.3 x10(3)/mcL Final    Eos # 08/27/2022 0.43  0 - 0.9 x10(3)/mcL Final    Baso # 08/27/2022 0.11  0 - 0.2 x10(3)/mcL Final    IG# 08/27/2022 0.04  0 - 0.04 x10(3)/mcL Final    IG% 08/27/2022 0.4  % Final    Natriuretic Peptide 08/27/2022 390.4 (H)  <=100.0 pg/mL Final    SARS COV-2 MOLECULAR 08/27/2022 Negative  Negative Final    Lactic Acid Level 08/27/2022 2.4 (H)  0.5 - 2.2 mmol/L Final       RADIOLOGY STUDIES ORDERED AND REVIEWED:  Imaging Results              X-Ray Chest 1 View (Final result)  Result time 08/27/22 21:37:36      Final result by Flex Kirk MD (08/27/22 21:37:36)                   Impression:      No acute cardiopulmonary  process.      Electronically signed by: Flex Kirk MD  Date:    08/27/2022  Time:    21:37               Narrative:    EXAMINATION:  Chest one view    CLINICAL HISTORY:  Shortness of breath    COMPARISON:  08/11/2022    FINDINGS:  Cardiac silhouette is enlarged.  Central vessels are upper limits of normal for size.  There is no confluent airspace disease.  There is no visible pneumothorax or pleural effusion.  There are surgical clips projected over the left axilla.                                      ED COURSE AND REEVALUATIONS:  Vitals:    08/27/22 2248   BP: 98/64   Pulse: 107   Resp: 18   Temp:        PROCEDURES PERFORMED IN ED:  Critical Care    Date/Time: 8/27/2022 10:17 PM  Performed by: Hamilton Bautista MD  Authorized by: Hamilton Bautista MD   Direct patient critical care time: 35 minutes  Total critical care time (exclusive of procedural time) : 35 minutes  Critical care was necessary to treat or prevent imminent or life-threatening deterioration of the following conditions: cardiac failure and renal failure (Rapid atrial fibrillation).  Critical care was time spent personally by me on the following activities: discussions with primary provider, evaluation of patient's response to treatment, examination of patient, obtaining history from patient or surrogate, ordering and performing treatments and interventions, ordering and review of laboratory studies, ordering and review of radiographic studies, pulse oximetry, re-evaluation of patient's condition and review of old charts.        ED Course as of 08/27/22 2254   Sat Aug 27, 2022   2136 Patient is improved, I was able to talk to her, she is able to communicate in 3-4 word sentences, she feels she has may be dealing much better, she is not complaining of palpitations, her although her heart rate is varying between , minimal movement makes her heart rate go up,    Her blood pressure is maintained,  S1, S2 is audible, tachycardia, irregular  rhythm  Better air entry bilaterally, occasional rhonchi  Abdomen soft nondistended nontender  Extremities no pedal edema  Capillary refill is normal    Patient has chronic kidney disease, she does have elevated lactic acid, but I do not want to overload her with IV fluids especially when her chest x-rays were consistent with congestive heart failure with possible left lower lobe pneumonia and she is already in rapid atrial fibrillation, I am going to continue given her Cardizem which I gave her to control the rate she is on metoprolol for rate control, I will continue that as a routine dose,  I have given her Rocephin IV for left lower lobe pneumonia,  Lasix was given to diurese her although she is on dialysis and has not produced any urine in the emergency room yet.    Overall patient has improved significantly she is not tachypneic and her heart rate comes down to 90s to 110, [GQ]   2211 Talked to Dr. Marti Talamantes, he is okay with admission, we are going to diurese her, control her rapid atrial fibrillation, and get dialysis done and see from there.  Although this morning patient's chest x-ray was read as the bilateral pneumonia, eye pressures are more in the left lower lobe, but she had congestive heart failure.  Radiologist looked at the chest x-ray and he does not feel there is any acute give medical problem going on. [GQ]      ED Course User Index  [GQ] Hamilton Bautista MD              DIAGNOSTIC IMPRESSION:      1. Congestive heart failure, unspecified HF chronicity, unspecified heart failure type    2. Shortness of breath    3. Rapid atrial fibrillation    4. Chronic kidney disease requiring chronic dialysis         ED Disposition Condition    Admit                Medication List        ASK your doctor about these medications      acetaminophen 325 mg Cap     atorvastatin 40 MG tablet  Commonly known as: LIPITOR     azithromycin 500 MG tablet  Commonly known as: ZITHROMAX     ELIQUIS 2.5 mg Tab  Generic  drug: apixaban     levothyroxine 200 MCG tablet  Commonly known as: SYNTHROID     loratadine 10 mg tablet  Commonly known as: CLARITIN     melatonin 3 mg Cap     pantoprazole 40 MG tablet  Commonly known as: PROTONIX     PREVIDENT 5000 PLUS 1.1 % Crea  Generic drug: fluoride (sodium)     rOPINIRole 0.5 MG tablet  Commonly known as: REQUIP     torsemide 20 MG Tab  Commonly known as: DEMADEX     zaleplon 10 MG capsule  Commonly known as: STAR Bautista MD  08/27/22 1543       Hamilton Bautista MD  08/27/22 9490

## 2022-08-28 NOTE — PLAN OF CARE
Problem: Adult Inpatient Plan of Care  Goal: Plan of Care Review  Outcome: Ongoing, Progressing  Flowsheets (Taken 8/28/2022 0583)  Plan of Care Reviewed With:   patient   family  Goal: Patient-Specific Goal (Individualized)  Outcome: Ongoing, Progressing  Goal: Absence of Hospital-Acquired Illness or Injury  Outcome: Ongoing, Progressing  Goal: Optimal Comfort and Wellbeing  Outcome: Ongoing, Progressing  Goal: Readiness for Transition of Care  Outcome: Ongoing, Progressing

## 2022-08-29 PROBLEM — I48.91 ATRIAL FIBRILLATION WITH RVR: Status: ACTIVE | Noted: 2022-08-29

## 2022-08-29 PROBLEM — I50.21 ACUTE SYSTOLIC CONGESTIVE HEART FAILURE: Status: ACTIVE | Noted: 2022-08-29

## 2022-08-29 PROBLEM — J18.9 PNEUMONIA DUE TO INFECTIOUS ORGANISM: Status: ACTIVE | Noted: 2022-08-29

## 2022-08-29 LAB
ALBUMIN SERPL-MCNC: 3.3 GM/DL (ref 3.4–4.8)
ALBUMIN/GLOB SERPL: 1.1 RATIO (ref 1.1–2)
ALP SERPL-CCNC: 220 UNIT/L (ref 40–150)
ALT SERPL-CCNC: 15 UNIT/L (ref 0–55)
AST SERPL-CCNC: 19 UNIT/L (ref 5–34)
BASOPHILS # BLD AUTO: 0.08 X10(3)/MCL (ref 0–0.2)
BASOPHILS NFR BLD AUTO: 0.7 %
BILIRUBIN DIRECT+TOT PNL SERPL-MCNC: 0.7 MG/DL
BUN SERPL-MCNC: 53 MG/DL (ref 9.8–20.1)
CALCIUM SERPL-MCNC: 8.9 MG/DL (ref 8.4–10.2)
CHLORIDE SERPL-SCNC: 102 MMOL/L (ref 98–107)
CO2 SERPL-SCNC: 25 MMOL/L (ref 23–31)
CREAT SERPL-MCNC: 3.87 MG/DL (ref 0.55–1.02)
EOSINOPHIL # BLD AUTO: 0.43 X10(3)/MCL (ref 0–0.9)
EOSINOPHIL NFR BLD AUTO: 3.8 %
ERYTHROCYTE [DISTWIDTH] IN BLOOD BY AUTOMATED COUNT: 14.6 % (ref 11.5–17)
GFR SERPLBLD CREATININE-BSD FMLA CKD-EPI: 12 MLS/MIN/1.73/M2
GLOBULIN SER-MCNC: 2.9 GM/DL (ref 2.4–3.5)
GLUCOSE SERPL-MCNC: 142 MG/DL (ref 82–115)
HCT VFR BLD AUTO: 34.1 % (ref 37–47)
HGB BLD-MCNC: 10.7 GM/DL (ref 12–16)
IMM GRANULOCYTES # BLD AUTO: 0.03 X10(3)/MCL (ref 0–0.04)
IMM GRANULOCYTES NFR BLD AUTO: 0.3 %
LYMPHOCYTES # BLD AUTO: 1.63 X10(3)/MCL (ref 0.6–4.6)
LYMPHOCYTES NFR BLD AUTO: 14.5 %
MAGNESIUM SERPL-MCNC: 1.8 MG/DL (ref 1.6–2.6)
MCH RBC QN AUTO: 29.8 PG (ref 27–31)
MCHC RBC AUTO-ENTMCNC: 31.4 MG/DL (ref 33–36)
MCV RBC AUTO: 95 FL (ref 80–94)
MONOCYTES # BLD AUTO: 0.9 X10(3)/MCL (ref 0.1–1.3)
MONOCYTES NFR BLD AUTO: 8 %
NEUTROPHILS # BLD AUTO: 8.2 X10(3)/MCL (ref 2.1–9.2)
NEUTROPHILS NFR BLD AUTO: 72.7 %
PHOSPHATE SERPL-MCNC: 4.1 MG/DL (ref 2.3–4.7)
PLATELET # BLD AUTO: 173 X10(3)/MCL (ref 130–400)
PMV BLD AUTO: 10.3 FL (ref 7.4–10.4)
POCT GLUCOSE: 110 MG/DL (ref 70–110)
POCT GLUCOSE: 134 MG/DL (ref 70–110)
POCT GLUCOSE: 160 MG/DL (ref 70–110)
POCT GLUCOSE: 166 MG/DL (ref 70–110)
POTASSIUM SERPL-SCNC: 4.1 MMOL/L (ref 3.5–5.1)
PROT SERPL-MCNC: 6.2 GM/DL (ref 5.8–7.6)
RBC # BLD AUTO: 3.59 X10(6)/MCL (ref 4.2–5.4)
SODIUM SERPL-SCNC: 143 MMOL/L (ref 136–145)
WBC # SPEC AUTO: 11.3 X10(3)/MCL (ref 4.5–11.5)

## 2022-08-29 PROCEDURE — 85025 COMPLETE CBC W/AUTO DIFF WBC: CPT | Performed by: FAMILY MEDICINE

## 2022-08-29 PROCEDURE — 80100014 HC HEMODIALYSIS 1:1

## 2022-08-29 PROCEDURE — 63600175 PHARM REV CODE 636 W HCPCS: Performed by: FAMILY MEDICINE

## 2022-08-29 PROCEDURE — 25000003 PHARM REV CODE 250: Performed by: INTERNAL MEDICINE

## 2022-08-29 PROCEDURE — 94761 N-INVAS EAR/PLS OXIMETRY MLT: CPT

## 2022-08-29 PROCEDURE — 80053 COMPREHEN METABOLIC PANEL: CPT | Performed by: FAMILY MEDICINE

## 2022-08-29 PROCEDURE — 36415 COLL VENOUS BLD VENIPUNCTURE: CPT | Performed by: FAMILY MEDICINE

## 2022-08-29 PROCEDURE — 27000221 HC OXYGEN, UP TO 24 HOURS

## 2022-08-29 PROCEDURE — 21400001 HC TELEMETRY ROOM

## 2022-08-29 PROCEDURE — 63600175 PHARM REV CODE 636 W HCPCS: Performed by: INTERNAL MEDICINE

## 2022-08-29 PROCEDURE — 97161 PT EVAL LOW COMPLEX 20 MIN: CPT

## 2022-08-29 PROCEDURE — 25000003 PHARM REV CODE 250: Performed by: FAMILY MEDICINE

## 2022-08-29 PROCEDURE — 84100 ASSAY OF PHOSPHORUS: CPT | Performed by: FAMILY MEDICINE

## 2022-08-29 PROCEDURE — 63700000 PHARM REV CODE 250 ALT 637 W/O HCPCS: Performed by: INTERNAL MEDICINE

## 2022-08-29 PROCEDURE — 99900035 HC TECH TIME PER 15 MIN (STAT)

## 2022-08-29 PROCEDURE — 25000242 PHARM REV CODE 250 ALT 637 W/ HCPCS: Performed by: FAMILY MEDICINE

## 2022-08-29 PROCEDURE — 83735 ASSAY OF MAGNESIUM: CPT | Performed by: FAMILY MEDICINE

## 2022-08-29 PROCEDURE — A4216 STERILE WATER/SALINE, 10 ML: HCPCS | Performed by: INTERNAL MEDICINE

## 2022-08-29 PROCEDURE — 94640 AIRWAY INHALATION TREATMENT: CPT

## 2022-08-29 RX ADMIN — ZOLPIDEM TARTRATE 5 MG: 5 TABLET ORAL at 08:08

## 2022-08-29 RX ADMIN — CARVEDILOL 3.12 MG: 3.12 TABLET, FILM COATED ORAL at 11:08

## 2022-08-29 RX ADMIN — APIXABAN 5 MG: 5 TABLET, FILM COATED ORAL at 11:08

## 2022-08-29 RX ADMIN — INSULIN DETEMIR 20 UNITS: 100 INJECTION, SOLUTION SUBCUTANEOUS at 08:08

## 2022-08-29 RX ADMIN — DILTIAZEM HYDROCHLORIDE 30 MG: 30 TABLET, FILM COATED ORAL at 11:08

## 2022-08-29 RX ADMIN — DILTIAZEM HYDROCHLORIDE 30 MG: 30 TABLET, FILM COATED ORAL at 05:08

## 2022-08-29 RX ADMIN — DILTIAZEM HYDROCHLORIDE 30 MG: 30 TABLET, FILM COATED ORAL at 06:08

## 2022-08-29 RX ADMIN — IPRATROPIUM BROMIDE AND ALBUTEROL SULFATE 3 ML: 2.5; .5 SOLUTION RESPIRATORY (INHALATION) at 07:08

## 2022-08-29 RX ADMIN — ACETAMINOPHEN 500 MG: 500 TABLET, FILM COATED ORAL at 09:08

## 2022-08-29 RX ADMIN — GABAPENTIN 100 MG: 100 CAPSULE ORAL at 05:08

## 2022-08-29 RX ADMIN — BUDESONIDE INHALATION 0.5 MG: 0.5 SUSPENSION RESPIRATORY (INHALATION) at 08:08

## 2022-08-29 RX ADMIN — ATORVASTATIN CALCIUM 40 MG: 40 TABLET, FILM COATED ORAL at 05:08

## 2022-08-29 RX ADMIN — METOLAZONE 5 MG: 2.5 TABLET ORAL at 11:08

## 2022-08-29 RX ADMIN — GABAPENTIN 100 MG: 100 CAPSULE ORAL at 11:08

## 2022-08-29 RX ADMIN — LACTOBACILLUS TAB 1 TABLET: TAB at 05:08

## 2022-08-29 RX ADMIN — BUDESONIDE INHALATION 0.5 MG: 0.5 SUSPENSION RESPIRATORY (INHALATION) at 07:08

## 2022-08-29 RX ADMIN — SODIUM CHLORIDE, PRESERVATIVE FREE 10 ML: 5 INJECTION INTRAVENOUS at 08:08

## 2022-08-29 RX ADMIN — PANTOPRAZOLE SODIUM 40 MG: 40 TABLET, DELAYED RELEASE ORAL at 11:08

## 2022-08-29 RX ADMIN — SODIUM CHLORIDE, PRESERVATIVE FREE 10 ML: 5 INJECTION INTRAVENOUS at 09:08

## 2022-08-29 RX ADMIN — LEVOTHYROXINE SODIUM 200 MCG: 0.1 TABLET ORAL at 11:08

## 2022-08-29 RX ADMIN — SODIUM CHLORIDE, PRESERVATIVE FREE 10 ML: 5 INJECTION INTRAVENOUS at 06:08

## 2022-08-29 RX ADMIN — IPRATROPIUM BROMIDE AND ALBUTEROL SULFATE 3 ML: 2.5; .5 SOLUTION RESPIRATORY (INHALATION) at 08:08

## 2022-08-29 RX ADMIN — GABAPENTIN 100 MG: 100 CAPSULE ORAL at 08:08

## 2022-08-29 RX ADMIN — ROPINIROLE HYDROCHLORIDE 1 MG: 1 TABLET, FILM COATED ORAL at 05:08

## 2022-08-29 RX ADMIN — ROPINIROLE 0.5 MG: 0.25 TABLET, FILM COATED ORAL at 08:08

## 2022-08-29 RX ADMIN — APIXABAN 5 MG: 5 TABLET, FILM COATED ORAL at 08:08

## 2022-08-29 RX ADMIN — LACTOBACILLUS TAB 1 TABLET: TAB at 11:08

## 2022-08-29 RX ADMIN — TORSEMIDE 20 MG: 20 TABLET ORAL at 11:08

## 2022-08-29 RX ADMIN — DEXTROSE MONOHYDRATE 1 G: 5 INJECTION INTRAVENOUS at 08:08

## 2022-08-29 RX ADMIN — FLUOXETINE 40 MG: 10 CAPSULE ORAL at 11:08

## 2022-08-29 RX ADMIN — AZITHROMYCIN MONOHYDRATE 500 MG: 250 TABLET ORAL at 11:08

## 2022-08-29 RX ADMIN — CARVEDILOL 3.12 MG: 3.12 TABLET, FILM COATED ORAL at 08:08

## 2022-08-29 RX ADMIN — SODIUM CHLORIDE, PRESERVATIVE FREE 10 ML: 5 INJECTION INTRAVENOUS at 02:08

## 2022-08-29 RX ADMIN — IPRATROPIUM BROMIDE AND ALBUTEROL SULFATE 3 ML: 2.5; .5 SOLUTION RESPIRATORY (INHALATION) at 01:08

## 2022-08-29 NOTE — H&P
[facility]                     [address] [address]    PATIENT NAME:       AVELINO ARCHIBALD  YOB: 1945  CSN:                [id number]   MRN:                [id number]  ADMIT DATE:         2022 20:03:00  PHYSICIAN:          Ludin Velasco MD                        HISTORY AND PHYSICAL      She presented to the emergency room last night with rapid AFib, CHF, and   underlying pneumonia.  She was hypoxic.  She does have end-stage renal disease   and is on hemodialysis.    PAST MEDICAL HISTORY:  Significant for her end-stage renal disease, obesity,   AFib in the past, anxiety, coronary artery disease, previous skin cancer, basal   cell carcinoma, hypertension, depression, diabetes type 2, ejection fraction of   less than 50%, hyperlipidemia, hypothyroidism, history of lymphoma, obstructive   sleep apnea, currently on CPAP, osteoporosis, peptic ulcer disease, cataract,   vitamin D deficiency, venous insufficiency with bilateral lower extremity edema.    She is at risk for falls.    PAST SURGICAL HISTORY:  Include carpal tunnel surgery, cholecystectomy,   colonoscopy, hemorrhoid surgery, hysterectomy partial, mole removal which was   malignant, left arm fistula with placement for dialysis fistulogram of left arm   by Dr. Liriano.    FAMILY HISTORY:  Father had a heart attack, is .  Mother had congestive   heart failure, she is .  She also had diabetes type 2.  Brother had bone   cancer, lung neoplasm.    SOCIAL HISTORY:  She never smoked cigarettes.  She does not abuse alcohol.  No   drugs.  She is .    ALLERGIES:    1. CODEINE.  2. LISINOPRIL.  3. MORPHINE.  4. CORTISONE.       MEDICATIONS:  On the chart and reconciled.    IMMUNIZATIONS:  Up to date.    CODE STATUS:  She is full code.    PHYSICAL EXAMINATION:  VITAL SIGNS:  Patient's blood pressure currently is 98/53.  She is  afebrile.    Pulse is 107, pulse ox 99% on 2.5 L per nasal cannula.   LUNGS:  Congested bilaterally consistent with congestive heart failure.    HEENT:  Grossly within normal limits.    NECK:  Supple, full range of motion.  No significant adenopathy.  HEART:  Regular rate and rhythm.  LUNGS:  Congested, as stated above.    ABDOMEN:  Obese, nontender.  EXTREMITIES:  2+ edema bilateral lower extremities and she has her fistula in   her left upper arm.  There is a thrill.   NEUROLOGIC:  She is alert is alert and oriented x4.  Her sons at bedside.  They   both agree that she is doing much better now than she did last night.   /RECTAL/BREAST:  Deferred at this time.    Labs, x-rays, medications are on the chart and reviewed.    ASSESSMENT:    1. Congestive heart failure with rapid atrial fibrillation.  Rate is now   controlled.  Echocardiogram is pending.  2. End-stage dialysis and she needs dialysis, which will happen tomorrow.    Today, we will give her Zaroxolyn and Lasix.  3. Diabetes.  4. Obstructive sleep apnea.  5. Pneumonia.  She is on antibiotics.  We will give breathing treatments as   well.        ______________________________  MD CHRISTINE Cardenas/LEIGHTON  DD:  08/28/2022  Time:  04:30PM  DT:  08/28/2022  Time:  04:54PM  Job #:  [id number]      HISTORY AND PHYSICAL

## 2022-08-29 NOTE — PT/OT/SLP EVAL
Physical Therapy Evaluation    Patient Name:  Danae Sanchez   MRN:  [id number]    Recommendations:     Discharge Recommendations:  nursing facility, skilled   Discharge Equipment Recommendations:     Barriers to discharge: None    Assessment:     Danae Sanchez is a 77 y.o. female admitted with a medical diagnosis of <principal problem not specified>.  She presents with the following impairments/functional limitations:  weakness, impaired endurance, gait instability, impaired functional mobility, impaired balance, pain .    Pt agreeable to bed mobility and sitting EOB only due to SOB and overall not feeling well. She reported pain in her feet that she currently takes medication for. Pt is a resident at NH and states that she sometimes walks with a rollator. Pt very weak in BLEs and required Min A to perform supine to sit. When moving she demonstrated increased respiratory rate that resolved with rest. She agreed to mobilize OOB tomorrow.     Rehab Prognosis: Good; patient would benefit from acute skilled PT services to address these deficits and reach maximum level of function.    Recent Surgery: * No surgery found *      Plan:     During this hospitalization, patient to be seen daily to address the identified rehab impairments via gait training, therapeutic activities, therapeutic exercises and progress toward the following goals:    Plan of Care Expires:  09/26/22    Subjective     Chief Complaint: weakness/SOB  Patient/Family Comments/goals: to return to NH safely   Pain/Comfort:  Pain Rating 1:  (pt reported neuropathy in both feet)  Pain Addressed 1: Nurse notified    Patients cultural, spiritual, Confucianism conflicts given the current situation:      Living Environment:  [facility]  Prior to admission, patients level of function was SBA-Min A.  Equipment used at home: rollator, walker, rolling.      Objective:     Communicated with nurse prior to session.  Patient found HOB elevated with peripheral IV,  oxygen, telemetry  upon PT entry to room.    General Precautions: Standard, fall   Orthopedic Precautions:    Braces:    Respiratory Status:  nasal cannula    Exams:  RLE ROM: WFL  RLE Strength: Deficits: 3-/5  LLE ROM: WFL  LLE Strength: Deficits: 3-/5    Functional Mobility:  Bed Mobility:     Scooting: minimum assistance  Supine to Sit: minimum assistance  Sit to Supine: moderate assistance  Balance: SBA sitting EOB for 8 minutes    Therapeutic Activities and Exercises:   See above    AM-PAC 6 CLICK MOBILITY  Total Score:      Patient left HOB elevated with all lines intact and call button in reach.    GOALS:   Multidisciplinary Problems       Physical Therapy Goals          Problem: Physical Therapy    Goal Priority Disciplines Outcome Goal Variances Interventions   Physical Therapy Goal     PT, PT/OT Ongoing, Progressing     Description: Goals to be met by: 22     Patient will increase functional independence with mobility by performin. Supine to sit with Modified [place]  2. Sit to stand transfer with Stand-by Assistance  3. Gait  x 150 feet with Stand-by Assistance using Rolling Walker.                          History:     Past Medical History:   Diagnosis Date    Anxiety disorder, unspecified     Chronic kidney disease, unspecified     Congestive heart failure (CHF)     Depression     Diabetes mellitus     GERD (gastroesophageal reflux disease)     History of anemia due to chronic kidney disease     Hypercholesterolemia     Hypertension     Hypothyroidism, unspecified     Parkinsonism     Paroxysmal atrial fibrillation        Past Surgical History:   Procedure Laterality Date    CHOLECYSTECTOMY      Dialysis shunt Left     HYSTERECTOMY         Time Tracking:     PT Received On: 22  PT Start Time: 1302     PT Stop Time: 1320  PT Total Time (min): 18 min     Billable Minutes: Evaluation 18      2022

## 2022-08-29 NOTE — PLAN OF CARE
Pt from [facility]. Anticipate return at [place]. Will send updates in [facility] and follow patient.

## 2022-08-30 LAB
ALBUMIN SERPL-MCNC: 3.4 GM/DL (ref 3.4–4.8)
ALBUMIN/GLOB SERPL: 1.1 RATIO (ref 1.1–2)
ALP SERPL-CCNC: 219 UNIT/L (ref 40–150)
ALT SERPL-CCNC: 15 UNIT/L (ref 0–55)
AORTIC VALVE CUSP SEPERATION: 48.66 CM
AST SERPL-CCNC: 21 UNIT/L (ref 5–34)
AV INDEX (PROSTH): 0.65
AV MEAN GRADIENT: 8 MMHG
AV PEAK GRADIENT: 12 MMHG
AV VALVE AREA: 1.93 CM2
AV VELOCITY RATIO: 0.63
BASOPHILS # BLD AUTO: 0.07 X10(3)/MCL (ref 0–0.2)
BASOPHILS NFR BLD AUTO: 0.9 %
BILIRUBIN DIRECT+TOT PNL SERPL-MCNC: 0.8 MG/DL
BSA FOR ECHO PROCEDURE: 2.58 M2
BUN SERPL-MCNC: 46 MG/DL (ref 9.8–20.1)
CALCIUM SERPL-MCNC: 8.9 MG/DL (ref 8.4–10.2)
CHLORIDE SERPL-SCNC: 101 MMOL/L (ref 98–107)
CO2 SERPL-SCNC: 25 MMOL/L (ref 23–31)
CREAT SERPL-MCNC: 3.65 MG/DL (ref 0.55–1.02)
CV ECHO LV RWT: 0.51 CM
DOP CALC AO PEAK VEL: 1.71 M/S
DOP CALC AO VTI: 37.6 CM
DOP CALC LVOT AREA: 3 CM2
DOP CALC LVOT DIAMETER: 1.94 CM
DOP CALC LVOT PEAK VEL: 1.08 M/S
DOP CALC LVOT STROKE VOLUME: 72.68 CM3
DOP CALCLVOT PEAK VEL VTI: 24.6 CM
E WAVE DECELERATION TIME: 237.54 MSEC
E/A RATIO: 22.6
ECHO LV POSTERIOR WALL: 1.34 CM (ref 0.6–1.1)
EJECTION FRACTION: 53 %
EOSINOPHIL # BLD AUTO: 0.32 X10(3)/MCL (ref 0–0.9)
EOSINOPHIL NFR BLD AUTO: 4 %
ERYTHROCYTE [DISTWIDTH] IN BLOOD BY AUTOMATED COUNT: 14.2 % (ref 11.5–17)
FRACTIONAL SHORTENING: 24 % (ref 28–44)
GFR SERPLBLD CREATININE-BSD FMLA CKD-EPI: 12 MLS/MIN/1.73/M2
GLOBULIN SER-MCNC: 3.2 GM/DL (ref 2.4–3.5)
GLUCOSE SERPL-MCNC: 128 MG/DL (ref 82–115)
HCT VFR BLD AUTO: 35.3 % (ref 37–47)
HGB BLD-MCNC: 11.2 GM/DL (ref 12–16)
IMM GRANULOCYTES # BLD AUTO: 0.03 X10(3)/MCL (ref 0–0.04)
IMM GRANULOCYTES NFR BLD AUTO: 0.4 %
INTERVENTRICULAR SEPTUM: 1.29 CM (ref 0.6–1.1)
LEFT ATRIUM SIZE: 5.11 CM
LEFT INTERNAL DIMENSION IN SYSTOLE: 4.04 CM (ref 2.1–4)
LEFT VENTRICLE DIASTOLIC VOLUME INDEX: 71.69 ML/M2
LEFT VENTRICLE DIASTOLIC VOLUME: 173.49 ML
LEFT VENTRICLE MASS INDEX: 121 G/M2
LEFT VENTRICLE SYSTOLIC VOLUME INDEX: 29.5 ML/M2
LEFT VENTRICLE SYSTOLIC VOLUME: 71.47 ML
LEFT VENTRICULAR INTERNAL DIMENSION IN DIASTOLE: 5.3 CM (ref 3.5–6)
LEFT VENTRICULAR MASS: 291.63 G
LVOT MG: 2.62 MMHG
LVOT MV: 0.77 CM/S
LYMPHOCYTES # BLD AUTO: 1.46 X10(3)/MCL (ref 0.6–4.6)
LYMPHOCYTES NFR BLD AUTO: 18.2 %
MAGNESIUM SERPL-MCNC: 1.8 MG/DL (ref 1.6–2.6)
MCH RBC QN AUTO: 29.9 PG (ref 27–31)
MCHC RBC AUTO-ENTMCNC: 31.7 MG/DL (ref 33–36)
MCV RBC AUTO: 94.1 FL (ref 80–94)
MONOCYTES # BLD AUTO: 0.84 X10(3)/MCL (ref 0.1–1.3)
MONOCYTES NFR BLD AUTO: 10.5 %
MV PEAK A VEL: 0.05 M/S
MV PEAK E VEL: 1.13 M/S
MV STENOSIS PRESSURE HALF TIME: 68.89 MS
MV VALVE AREA P 1/2 METHOD: 3.19 CM2
NEUTROPHILS # BLD AUTO: 5.3 X10(3)/MCL (ref 2.1–9.2)
NEUTROPHILS NFR BLD AUTO: 66 %
PHOSPHATE SERPL-MCNC: 4.3 MG/DL (ref 2.3–4.7)
PISA TR MAX VEL: 2.69 M/S
PLATELET # BLD AUTO: 153 X10(3)/MCL (ref 130–400)
PMV BLD AUTO: 10.3 FL (ref 7.4–10.4)
POCT GLUCOSE: 122 MG/DL (ref 70–110)
POCT GLUCOSE: 125 MG/DL (ref 70–110)
POCT GLUCOSE: 125 MG/DL (ref 70–110)
POCT GLUCOSE: 171 MG/DL (ref 70–110)
POTASSIUM SERPL-SCNC: 3.8 MMOL/L (ref 3.5–5.1)
PROT SERPL-MCNC: 6.6 GM/DL (ref 5.8–7.6)
PV PEAK VELOCITY: 1.21 CM/S
RA PRESSURE: 8 MMHG
RBC # BLD AUTO: 3.75 X10(6)/MCL (ref 4.2–5.4)
RIGHT VENTRICULAR END-DIASTOLIC DIMENSION: 2.95 CM
SODIUM SERPL-SCNC: 142 MMOL/L (ref 136–145)
TR MAX PG: 29 MMHG
TV REST PULMONARY ARTERY PRESSURE: 37 MMHG
WBC # SPEC AUTO: 8 X10(3)/MCL (ref 4.5–11.5)

## 2022-08-30 PROCEDURE — 27000221 HC OXYGEN, UP TO 24 HOURS

## 2022-08-30 PROCEDURE — 25000003 PHARM REV CODE 250: Performed by: INTERNAL MEDICINE

## 2022-08-30 PROCEDURE — 94761 N-INVAS EAR/PLS OXIMETRY MLT: CPT

## 2022-08-30 PROCEDURE — 36415 COLL VENOUS BLD VENIPUNCTURE: CPT | Performed by: FAMILY MEDICINE

## 2022-08-30 PROCEDURE — 99900035 HC TECH TIME PER 15 MIN (STAT)

## 2022-08-30 PROCEDURE — 97530 THERAPEUTIC ACTIVITIES: CPT

## 2022-08-30 PROCEDURE — 83735 ASSAY OF MAGNESIUM: CPT | Performed by: FAMILY MEDICINE

## 2022-08-30 PROCEDURE — 94640 AIRWAY INHALATION TREATMENT: CPT

## 2022-08-30 PROCEDURE — 21400001 HC TELEMETRY ROOM

## 2022-08-30 PROCEDURE — 25000003 PHARM REV CODE 250: Performed by: FAMILY MEDICINE

## 2022-08-30 PROCEDURE — 63700000 PHARM REV CODE 250 ALT 637 W/O HCPCS: Performed by: INTERNAL MEDICINE

## 2022-08-30 PROCEDURE — 25000242 PHARM REV CODE 250 ALT 637 W/ HCPCS: Performed by: FAMILY MEDICINE

## 2022-08-30 PROCEDURE — 63600175 PHARM REV CODE 636 W HCPCS: Performed by: INTERNAL MEDICINE

## 2022-08-30 PROCEDURE — 84100 ASSAY OF PHOSPHORUS: CPT | Performed by: FAMILY MEDICINE

## 2022-08-30 PROCEDURE — 94799 UNLISTED PULMONARY SVC/PX: CPT

## 2022-08-30 PROCEDURE — 85025 COMPLETE CBC W/AUTO DIFF WBC: CPT | Performed by: FAMILY MEDICINE

## 2022-08-30 PROCEDURE — 63600175 PHARM REV CODE 636 W HCPCS: Performed by: FAMILY MEDICINE

## 2022-08-30 PROCEDURE — A4216 STERILE WATER/SALINE, 10 ML: HCPCS | Performed by: INTERNAL MEDICINE

## 2022-08-30 PROCEDURE — 80053 COMPREHEN METABOLIC PANEL: CPT | Performed by: FAMILY MEDICINE

## 2022-08-30 RX ORDER — MUPIROCIN 20 MG/G
OINTMENT TOPICAL 2 TIMES DAILY
Status: DISCONTINUED | OUTPATIENT
Start: 2022-08-30 | End: 2022-09-01 | Stop reason: HOSPADM

## 2022-08-30 RX ADMIN — INSULIN DETEMIR 20 UNITS: 100 INJECTION, SOLUTION SUBCUTANEOUS at 09:08

## 2022-08-30 RX ADMIN — FLUOXETINE 40 MG: 10 CAPSULE ORAL at 09:08

## 2022-08-30 RX ADMIN — MUPIROCIN: 20 OINTMENT TOPICAL at 09:08

## 2022-08-30 RX ADMIN — IPRATROPIUM BROMIDE AND ALBUTEROL SULFATE 3 ML: 2.5; .5 SOLUTION RESPIRATORY (INHALATION) at 01:08

## 2022-08-30 RX ADMIN — SODIUM CHLORIDE, PRESERVATIVE FREE 10 ML: 5 INJECTION INTRAVENOUS at 06:08

## 2022-08-30 RX ADMIN — DILTIAZEM HYDROCHLORIDE 30 MG: 30 TABLET, FILM COATED ORAL at 06:08

## 2022-08-30 RX ADMIN — ZOLPIDEM TARTRATE 5 MG: 5 TABLET ORAL at 09:08

## 2022-08-30 RX ADMIN — ACETAMINOPHEN 500 MG: 500 TABLET, FILM COATED ORAL at 09:08

## 2022-08-30 RX ADMIN — DEXTROSE MONOHYDRATE 1 G: 5 INJECTION INTRAVENOUS at 09:08

## 2022-08-30 RX ADMIN — GABAPENTIN 100 MG: 100 CAPSULE ORAL at 09:08

## 2022-08-30 RX ADMIN — ATORVASTATIN CALCIUM 40 MG: 40 TABLET, FILM COATED ORAL at 05:08

## 2022-08-30 RX ADMIN — PANTOPRAZOLE SODIUM 40 MG: 40 TABLET, DELAYED RELEASE ORAL at 09:08

## 2022-08-30 RX ADMIN — LEVOTHYROXINE SODIUM 200 MCG: 0.1 TABLET ORAL at 09:08

## 2022-08-30 RX ADMIN — IPRATROPIUM BROMIDE AND ALBUTEROL SULFATE 3 ML: 2.5; .5 SOLUTION RESPIRATORY (INHALATION) at 07:08

## 2022-08-30 RX ADMIN — AZITHROMYCIN MONOHYDRATE 500 MG: 250 TABLET ORAL at 09:08

## 2022-08-30 RX ADMIN — LACTOBACILLUS TAB 1 TABLET: TAB at 09:08

## 2022-08-30 RX ADMIN — BUDESONIDE INHALATION 0.5 MG: 0.5 SUSPENSION RESPIRATORY (INHALATION) at 07:08

## 2022-08-30 RX ADMIN — GABAPENTIN 100 MG: 100 CAPSULE ORAL at 03:08

## 2022-08-30 RX ADMIN — APIXABAN 5 MG: 5 TABLET, FILM COATED ORAL at 09:08

## 2022-08-30 RX ADMIN — SODIUM CHLORIDE, PRESERVATIVE FREE 10 ML: 5 INJECTION INTRAVENOUS at 03:08

## 2022-08-30 RX ADMIN — DILTIAZEM HYDROCHLORIDE 30 MG: 30 TABLET, FILM COATED ORAL at 12:08

## 2022-08-30 RX ADMIN — ACETAMINOPHEN 500 MG: 500 TABLET, FILM COATED ORAL at 11:08

## 2022-08-30 RX ADMIN — ROPINIROLE HYDROCHLORIDE 1 MG: 1 TABLET, FILM COATED ORAL at 05:08

## 2022-08-30 RX ADMIN — CARVEDILOL 3.12 MG: 3.12 TABLET, FILM COATED ORAL at 09:08

## 2022-08-30 RX ADMIN — LACTOBACILLUS TAB 1 TABLET: TAB at 05:08

## 2022-08-30 RX ADMIN — SODIUM CHLORIDE, PRESERVATIVE FREE 10 ML: 5 INJECTION INTRAVENOUS at 09:08

## 2022-08-30 RX ADMIN — ROPINIROLE 0.5 MG: 0.25 TABLET, FILM COATED ORAL at 09:08

## 2022-08-30 RX ADMIN — FLUTICASONE PROPIONATE 100 MCG: 50 SPRAY, METERED NASAL at 11:08

## 2022-08-30 RX ADMIN — LACTOBACILLUS TAB 1 TABLET: TAB at 12:08

## 2022-08-30 NOTE — PLAN OF CARE
Problem: Adult Inpatient Plan of Care  Goal: Plan of Care Review  Outcome: Ongoing, Progressing  Goal: Patient-Specific Goal (Individualized)  Outcome: Ongoing, Progressing  Goal: Absence of Hospital-Acquired Illness or Injury  Outcome: Ongoing, Progressing  Goal: Optimal Comfort and Wellbeing  Outcome: Ongoing, Progressing  Goal: Readiness for Transition of Care  Outcome: Ongoing, Progressing     Problem: Bariatric Environmental Safety  Goal: Safety Maintained with Care  Outcome: Ongoing, Progressing     Problem: Device-Related Complication Risk (Hemodialysis)  Goal: Safe, Effective Therapy Delivery  Outcome: Ongoing, Progressing     Problem: Hemodynamic Instability (Hemodialysis)  Goal: Effective Tissue Perfusion  Outcome: Ongoing, Progressing     Problem: Infection (Hemodialysis)  Goal: Absence of Infection Signs and Symptoms  Outcome: Ongoing, Progressing     Problem: Balance Impairment (Functional Deficit)  Goal: Improved Balance and Postural Control  Outcome: Ongoing, Progressing     Problem: Muscle Strength Impairment (Functional Deficit)  Goal: Improved Muscle Strength  Outcome: Ongoing, Progressing     Problem: Fluid Imbalance (Pneumonia)  Goal: Fluid Balance  Outcome: Ongoing, Progressing     Problem: Infection (Pneumonia)  Goal: Resolution of Infection Signs and Symptoms  Outcome: Ongoing, Progressing     Problem: Respiratory Compromise (Pneumonia)  Goal: Effective Oxygenation and Ventilation  Outcome: Ongoing, Progressing

## 2022-08-30 NOTE — PT/OT/SLP PROGRESS
Physical Therapy Treatment    Patient Name:  Danae Sanchez   MRN:  [id number]    Recommendations:     Discharge Recommendations:  nursing facility, skilled   Discharge Equipment Recommendations:     Barriers to discharge: None    Assessment:     Danae Sanchez is a 77 y.o. female admitted with a medical diagnosis of Acute systolic congestive heart failure.  She presents with the following impairments/functional limitations:  weakness, impaired endurance, impaired balance, gait instability, impaired cardiopulmonary response to activity, impaired functional mobility .    Pt found up on couch. Upon completing BLE seated exercises, pt's HR increased to 130. She was given time to rest and HR then lowered to 105. After more rest she was able to ambulate in room with supplemental O2 donned for 80ft before becoming very SOB and needing to sit. Vitals were checked and SpO2 was 96 while HR was 145. She again rested and all symptoms resolved within 5 minutes.    Rehab Prognosis: Good; patient would benefit from acute skilled PT services to address these deficits and reach maximum level of function.    Recent Surgery: * No surgery found *      Plan:     During this hospitalization, patient to be seen daily to address the identified rehab impairments via gait training, therapeutic activities, therapeutic exercises and progress toward the following goals:    Plan of Care Expires:  09/26/22    Subjective     Chief Complaint: SOB  Patient/Family Comments/goals: to return home  Pain/Comfort:  Pain Rating 1: 0/10      Objective:     Communicated with patient prior to session.  Patient found up in chair with peripheral IV, pulse ox (continuous), oxygen, telemetry upon PT entry to room.     General Precautions: Standard, fall   Orthopedic Precautions:    Braces:    Respiratory Status:  nasal cannula     Functional Mobility:  Transfers:     Sit to Stand:  minimum assistance with rolling walker  Bed to Chair: contact guard assistance  with  rolling walker  using  Step Transfer  Gait: Pt ambulated 80ft in room with CGA and RW  Balance: CGA static standing with RW      AM-PAC 6 CLICK MOBILITY          Therapeutic Activities and Exercises:   See above  Seated [place], TKE's, ABD/ADD    Patient left up in chair with all lines intact, call button in reach, and nursing present..    GOALS:   Multidisciplinary Problems       Physical Therapy Goals          Problem: Physical Therapy    Goal Priority Disciplines Outcome Goal Variances Interventions   Physical Therapy Goal     PT, PT/OT Ongoing, Progressing     Description: Goals to be met by: 22     Patient will increase functional independence with mobility by performin. Supine to sit with Modified [place]  2. Sit to stand transfer with Stand-by Assistance  3. Gait  x 150 feet with Stand-by Assistance using Rolling Walker.                          Time Tracking:     PT Received On: 22  PT Start Time: 953     PT Stop Time: 1013  PT Total Time (min): 20 min     Billable Minutes: Therapeutic Activity 20    Treatment Type: Treatment  PT/PTA: PT           2022

## 2022-08-30 NOTE — PROGRESS NOTES
"[facility]  Medical Surgical Unit  Adult Nutrition  Progress Note    SUMMARY       Recommendations    Recommendation/Intervention:     1. Rec'd continue Renal/Diabetic Diet as tolerated.      2. Daily weight and labs.     3. Monitor intake, weight, and labs.      RD following and available as needed.  Thank you.    Communication of RD Recs: discussed on rounds      Service: Nutrition       Malnutrition Assessment                                       Reason for Assessment    Reason For Assessment: identified at risk by screening criteria, other (see comments) (Acute Systolic Congestive Heart Failure. On HD.)  Diagnosis: cardiac disease, diabetes diagnosis/complications, renal disease  Relevant Medical History: Anxiety disorder, unspecified     Chronic kidney disease, unspecified     Congestive heart failure (CHF)     Depression     Diabetes mellitus     GERD (gastroesophageal reflux disease)     History of anemia due to chronic kidney disease     Hypercholesterolemia     Hypertension     Hypothyroidism, unspecified     Parkinsonism     Paroxysmal atrial fibrillation.  General Information Comments: 8/30:  Pt with good appetite and intake.  Consuming 100% of meals per EMR recorded intake.  On HD. Will continue to monitor during stay.    Nutrition Risk Screen    Nutrition Risk Screen: no indicators present    Nutrition/Diet History         Anthropometrics    Temp: 98.1 °F (36.7 °C)  Height Method: Stated  Height: 5' 7.01" (170.2 cm)  Height (inches): 67.01 in  Weight Method: Standard Scale  Weight: 135 kg (297 lb 9.9 oz)  Weight (lb): 297.62 lb  Ideal Body Weight (IBW), Female: 135.05 lb  % Ideal Body Weight, Female (lb): 220.38 %  BMI (Calculated): 46.6  BMI Grade: greater than 40 - morbid obesity       Lab/Procedures/Meds    Pertinent Labs Reviewed: reviewed  Pertinent Labs Comments: 8/30: BUN 46(H); Crea 3.65(H); GFR 12(L); Alk Phos 219(H); (H); H/H 11.2/35.3(L).  Pertinent Medications Reviewed: " reviewed  Pertinent Medications Comments: Insulin.    Physical Findings/Assessment         Estimated/Assessed Needs                                   Nutrition Prescription Ordered    Current Diet Order: Renal/Diabetic.  Nutrition Order Comments: 100% Intake.    Evaluation of Received Nutrient/Fluid Intake    Energy Calories Required: meeting needs  Protein Required: meeting needs  Fluid Required: meeting needs  % Intake of Estimated Energy Needs: 75 - 100 %  % Meal Intake: 75 - 100 %    Nutrition Risk          Monitor and Evaluation    Food and Nutrient Intake: energy intake, food and beverage intake  Food and Nutrient Adminstration: diet order  Anthropometric Measurements: weight change, weight  Biochemical Data, Medical Tests and Procedures: gastrointestinal profile, electrolyte and renal panel, lipid profile, glucose/endocrine profile, inflammatory profile     Nutrition Follow-Up    RD Follow-up?: Yes

## 2022-08-30 NOTE — PROGRESS NOTES
[facility]                     [address] [address]    PATIENT NAME:       AVELINO SANCHEZ  YOB: 1945  CSN:                [id number]   MRN:                [id number]  ADMIT DATE:         08/27/2022 20:03:00  PHYSICIAN:          Ludin Velasco MD                            PROGRESS NOTE    DATE:      Ms. Sanchez was admitted secondary to atrial fibrillation with rapid ventricular   rate.  This caused her to have diastolic dysfunction and CHF.  Patient   continues with AFib, but her rate is in the low 100 and upper 90s.  She also has   end-stage chronic renal disease and is on dialysis.  She was dialyzed today and   feels much better as well.  She remains hypoxic, however, and she is on O2 per   nasal cannula at 2 L.  Chest x-ray showed a possible pneumonia in the bases as   well.  Patient has underlying diabetes and was edematous yesterday as well.    PHYSICAL EXAMINATION:  GENERAL:  Patient is obese lying in bed, in no acute distress.  Echocardiogram   is being performed.    VITAL SIGNS:  She has a heart rate in the low 100.  Her blood pressure last   check was 99/63, pulse ox on 2 L of oxygen per nasal cannula is 95, and she is   much more comfortable than she was yesterday.  Accu-Cheks are stable.  Labs,   x-rays, medications are on the chart and reviewed.    HEART:  Irregularly irregular with a mildly tachycardic rate.    LUNGS:  Crackles continuing, but no wheezing.    ABDOMEN:  Obese, nontender.    EXTREMITIES:  She has a fistula in her left upper arm.  Her edema is   significantly better of bilateral lower extremity.    ASSESSMENT:    1. Congestive heart failure secondary to volume overload from her end-stage   renal disease and also from her rapid atrial fibrillation with known underlying   diastolic and systolic dysfunction.  2. Hypertension, controlled.  3. Diabetes, controlled.  4.  Obesity.  5. End-stage renal disease, currently on hemodialysis.  6. Pneumonia.  7. Elevated white blood cell count.  8. Elevated alkaline phosphatase.  9. Multiple other medical problems as per history and physical/problem list.    PLAN:    1. Continue current care.    2. Follow up the echocardiogram.    3. Continue to dialyze the patient and diurese the patient.        ______________________________  MD CHRISTINE Cardenas/LEIGHTON  DD:  08/29/2022  Time:  07:47PM  DT:  08/29/2022  Time:  07:55PM  Job #:  [id number]      PROGRESS NOTE

## 2022-08-31 LAB
POCT GLUCOSE: 113 MG/DL (ref 70–110)
POCT GLUCOSE: 118 MG/DL (ref 70–110)
POCT GLUCOSE: 121 MG/DL (ref 70–110)
POCT GLUCOSE: 133 MG/DL (ref 70–110)

## 2022-08-31 PROCEDURE — 97530 THERAPEUTIC ACTIVITIES: CPT

## 2022-08-31 PROCEDURE — 25000003 PHARM REV CODE 250: Performed by: FAMILY MEDICINE

## 2022-08-31 PROCEDURE — 63600175 PHARM REV CODE 636 W HCPCS: Performed by: FAMILY MEDICINE

## 2022-08-31 PROCEDURE — 21400001 HC TELEMETRY ROOM

## 2022-08-31 PROCEDURE — 94761 N-INVAS EAR/PLS OXIMETRY MLT: CPT

## 2022-08-31 PROCEDURE — 25000242 PHARM REV CODE 250 ALT 637 W/ HCPCS: Performed by: FAMILY MEDICINE

## 2022-08-31 PROCEDURE — 27000221 HC OXYGEN, UP TO 24 HOURS

## 2022-08-31 PROCEDURE — 94640 AIRWAY INHALATION TREATMENT: CPT

## 2022-08-31 PROCEDURE — 63700000 PHARM REV CODE 250 ALT 637 W/O HCPCS: Performed by: INTERNAL MEDICINE

## 2022-08-31 PROCEDURE — 99900035 HC TECH TIME PER 15 MIN (STAT)

## 2022-08-31 PROCEDURE — 63600175 PHARM REV CODE 636 W HCPCS: Performed by: INTERNAL MEDICINE

## 2022-08-31 PROCEDURE — 93005 ELECTROCARDIOGRAM TRACING: CPT

## 2022-08-31 PROCEDURE — 25000003 PHARM REV CODE 250: Performed by: INTERNAL MEDICINE

## 2022-08-31 PROCEDURE — 94799 UNLISTED PULMONARY SVC/PX: CPT

## 2022-08-31 PROCEDURE — A4216 STERILE WATER/SALINE, 10 ML: HCPCS | Performed by: INTERNAL MEDICINE

## 2022-08-31 PROCEDURE — 80100014 HC HEMODIALYSIS 1:1

## 2022-08-31 RX ORDER — CARVEDILOL 6.25 MG/1
6.25 TABLET ORAL 2 TIMES DAILY
Status: DISCONTINUED | OUTPATIENT
Start: 2022-08-31 | End: 2022-09-01 | Stop reason: HOSPADM

## 2022-08-31 RX ADMIN — LACTOBACILLUS TAB 1 TABLET: TAB at 05:08

## 2022-08-31 RX ADMIN — SODIUM CHLORIDE, PRESERVATIVE FREE 10 ML: 5 INJECTION INTRAVENOUS at 09:08

## 2022-08-31 RX ADMIN — DILTIAZEM HYDROCHLORIDE 30 MG: 30 TABLET, FILM COATED ORAL at 05:08

## 2022-08-31 RX ADMIN — IPRATROPIUM BROMIDE AND ALBUTEROL SULFATE 3 ML: 2.5; .5 SOLUTION RESPIRATORY (INHALATION) at 07:08

## 2022-08-31 RX ADMIN — AZITHROMYCIN MONOHYDRATE 500 MG: 250 TABLET ORAL at 10:08

## 2022-08-31 RX ADMIN — DEXTROSE MONOHYDRATE 1 G: 5 INJECTION INTRAVENOUS at 09:08

## 2022-08-31 RX ADMIN — GABAPENTIN 100 MG: 100 CAPSULE ORAL at 10:08

## 2022-08-31 RX ADMIN — DILTIAZEM HYDROCHLORIDE 30 MG: 30 TABLET, FILM COATED ORAL at 10:08

## 2022-08-31 RX ADMIN — PANTOPRAZOLE SODIUM 40 MG: 40 TABLET, DELAYED RELEASE ORAL at 10:08

## 2022-08-31 RX ADMIN — ATORVASTATIN CALCIUM 40 MG: 40 TABLET, FILM COATED ORAL at 05:08

## 2022-08-31 RX ADMIN — GABAPENTIN 100 MG: 100 CAPSULE ORAL at 05:08

## 2022-08-31 RX ADMIN — ZOLPIDEM TARTRATE 5 MG: 5 TABLET ORAL at 09:08

## 2022-08-31 RX ADMIN — APIXABAN 5 MG: 5 TABLET, FILM COATED ORAL at 09:08

## 2022-08-31 RX ADMIN — LEVOTHYROXINE SODIUM 200 MCG: 0.1 TABLET ORAL at 10:08

## 2022-08-31 RX ADMIN — CARVEDILOL 3.12 MG: 3.12 TABLET, FILM COATED ORAL at 10:08

## 2022-08-31 RX ADMIN — APIXABAN 5 MG: 5 TABLET, FILM COATED ORAL at 10:08

## 2022-08-31 RX ADMIN — FLUTICASONE PROPIONATE 100 MCG: 50 SPRAY, METERED NASAL at 10:08

## 2022-08-31 RX ADMIN — LACTOBACILLUS TAB 1 TABLET: TAB at 10:08

## 2022-08-31 RX ADMIN — ROPINIROLE 0.5 MG: 0.25 TABLET, FILM COATED ORAL at 09:08

## 2022-08-31 RX ADMIN — GABAPENTIN 100 MG: 100 CAPSULE ORAL at 09:08

## 2022-08-31 RX ADMIN — BUDESONIDE INHALATION 0.5 MG: 0.5 SUSPENSION RESPIRATORY (INHALATION) at 07:08

## 2022-08-31 RX ADMIN — INSULIN DETEMIR 20 UNITS: 100 INJECTION, SOLUTION SUBCUTANEOUS at 09:08

## 2022-08-31 RX ADMIN — CARVEDILOL 6.25 MG: 6.25 TABLET, FILM COATED ORAL at 09:08

## 2022-08-31 RX ADMIN — SODIUM CHLORIDE, PRESERVATIVE FREE 10 ML: 5 INJECTION INTRAVENOUS at 05:08

## 2022-08-31 RX ADMIN — METOLAZONE 5 MG: 2.5 TABLET ORAL at 10:08

## 2022-08-31 RX ADMIN — IPRATROPIUM BROMIDE AND ALBUTEROL SULFATE 3 ML: 2.5; .5 SOLUTION RESPIRATORY (INHALATION) at 01:08

## 2022-08-31 RX ADMIN — TORSEMIDE 20 MG: 20 TABLET ORAL at 10:08

## 2022-08-31 RX ADMIN — ROPINIROLE HYDROCHLORIDE 1 MG: 1 TABLET, FILM COATED ORAL at 05:08

## 2022-08-31 RX ADMIN — FLUOXETINE 40 MG: 10 CAPSULE ORAL at 10:08

## 2022-08-31 RX ADMIN — MUPIROCIN: 20 OINTMENT TOPICAL at 09:08

## 2022-08-31 NOTE — PT/OT/SLP PROGRESS
Physical Therapy Treatment    Patient Name:  Danae Sanchez   MRN:  [id number]    Recommendations:     Discharge Recommendations:  nursing facility, skilled   Discharge Equipment Recommendations:     Barriers to discharge: None    Assessment:     Danae Sanchez is a 77 y.o. female admitted with a medical diagnosis of Acute systolic congestive heart failure.  She presents with the following impairments/functional limitations:  weakness, impaired endurance, impaired balance, gait instability, impaired cardiopulmonary response to activity, impaired functional mobility .    Pt found up on couch. During ambulation, HR was checked and was 166bpm. She stated she felt tired and wished to sit down. After resting 5min her HR decreased to 126bpm. Noted SOB and she declined to walk again due to fatigue. She is not limited by lack of strength or will, but by poor endurance and abnormal cardiorespiratory response to exercise.     Rehab Prognosis: Good; patient would benefit from acute skilled PT services to address these deficits and reach maximum level of function.    Recent Surgery: * No surgery found *      Plan:     During this hospitalization, patient to be seen daily to address the identified rehab impairments via gait training, therapeutic activities, therapeutic exercises and progress toward the following goals:    Plan of Care Expires:  09/26/22    Subjective     Chief Complaint: SOB  Patient/Family Comments/goals: to return home  Pain/Comfort:         Objective:     Communicated with patient prior to session.  Patient found up in chair with peripheral IV, pulse ox (continuous), oxygen, telemetry upon PT entry to room.     General Precautions: Standard, fall   Orthopedic Precautions:    Braces:    Respiratory Status:  nasal cannula     Functional Mobility:  Transfers:     Sit to Stand:  minimum assistance with rolling walker  Bed to Chair: contact guard assistance with  rolling walker  using  Step Transfer  Gait: Pt  ambulated 80ft in room with CGA and RW  Balance: CGA static standing with RW      AM-PAC 6 CLICK MOBILITY          Therapeutic Activities and Exercises:   See above    Patient left up in chair with all lines intact, call button in reach, and nursing present..    GOALS:   Multidisciplinary Problems       Physical Therapy Goals          Problem: Physical Therapy    Goal Priority Disciplines Outcome Goal Variances Interventions   Physical Therapy Goal     PT, PT/OT Ongoing, Progressing     Description: Goals to be met by: 22     Patient will increase functional independence with mobility by performin. Supine to sit with Modified [place]  2. Sit to stand transfer with Stand-by Assistance  3. Gait  x 150 feet with Stand-by Assistance using Rolling Walker.                          Time Tracking:     PT Received On: 22  PT Start Time: 1148    PT Stop Time: 1203  PT Total Time (min): 15 min    Billable Minutes: Therapeutic Activity 15    Treatment Type: Treatment  PT/PTA: PT           2022

## 2022-09-01 VITALS
RESPIRATION RATE: 16 BRPM | DIASTOLIC BLOOD PRESSURE: 63 MMHG | TEMPERATURE: 98 F | HEART RATE: 88 BPM | WEIGHT: 293 LBS | BODY MASS INDEX: 45.99 KG/M2 | SYSTOLIC BLOOD PRESSURE: 103 MMHG | OXYGEN SATURATION: 97 % | HEIGHT: 67 IN

## 2022-09-01 PROBLEM — J18.9 PNEUMONIA DUE TO INFECTIOUS ORGANISM: Status: RESOLVED | Noted: 2022-08-29 | Resolved: 2022-09-01

## 2022-09-01 PROBLEM — I50.9 CONGESTIVE HEART FAILURE: Status: ACTIVE | Noted: 2022-09-01

## 2022-09-01 LAB
ALBUMIN SERPL-MCNC: 3.3 GM/DL (ref 3.4–4.8)
ALBUMIN/GLOB SERPL: 1 RATIO (ref 1.1–2)
ALP SERPL-CCNC: 228 UNIT/L (ref 40–150)
ALT SERPL-CCNC: 18 UNIT/L (ref 0–55)
AST SERPL-CCNC: 21 UNIT/L (ref 5–34)
BASOPHILS # BLD AUTO: 0.08 X10(3)/MCL (ref 0–0.2)
BASOPHILS NFR BLD AUTO: 0.9 %
BILIRUBIN DIRECT+TOT PNL SERPL-MCNC: 0.6 MG/DL
BNP BLD-MCNC: 227.3 PG/ML
BUN SERPL-MCNC: 53 MG/DL (ref 9.8–20.1)
CALCIUM SERPL-MCNC: 8.7 MG/DL (ref 8.4–10.2)
CHLORIDE SERPL-SCNC: 100 MMOL/L (ref 98–107)
CO2 SERPL-SCNC: 22 MMOL/L (ref 23–31)
CREAT SERPL-MCNC: 3.78 MG/DL (ref 0.55–1.02)
EOSINOPHIL # BLD AUTO: 0.37 X10(3)/MCL (ref 0–0.9)
EOSINOPHIL NFR BLD AUTO: 4.2 %
ERYTHROCYTE [DISTWIDTH] IN BLOOD BY AUTOMATED COUNT: 13.7 % (ref 11.5–17)
GFR SERPLBLD CREATININE-BSD FMLA CKD-EPI: 12 MLS/MIN/1.73/M2
GLOBULIN SER-MCNC: 3.3 GM/DL (ref 2.4–3.5)
GLUCOSE SERPL-MCNC: 129 MG/DL (ref 82–115)
HCT VFR BLD AUTO: 36.9 % (ref 37–47)
HGB BLD-MCNC: 11.7 GM/DL (ref 12–16)
IMM GRANULOCYTES # BLD AUTO: 0.02 X10(3)/MCL (ref 0–0.04)
IMM GRANULOCYTES NFR BLD AUTO: 0.2 %
LYMPHOCYTES # BLD AUTO: 1.6 X10(3)/MCL (ref 0.6–4.6)
LYMPHOCYTES NFR BLD AUTO: 18.1 %
MAGNESIUM SERPL-MCNC: 1.7 MG/DL (ref 1.6–2.6)
MCH RBC QN AUTO: 29.5 PG (ref 27–31)
MCHC RBC AUTO-ENTMCNC: 31.7 MG/DL (ref 33–36)
MCV RBC AUTO: 92.9 FL (ref 80–94)
MONOCYTES # BLD AUTO: 1.04 X10(3)/MCL (ref 0.1–1.3)
MONOCYTES NFR BLD AUTO: 11.8 %
NEUTROPHILS # BLD AUTO: 5.7 X10(3)/MCL (ref 2.1–9.2)
NEUTROPHILS NFR BLD AUTO: 64.8 %
PHOSPHATE SERPL-MCNC: 5.4 MG/DL (ref 2.3–4.7)
PLATELET # BLD AUTO: 181 X10(3)/MCL (ref 130–400)
PMV BLD AUTO: 10.6 FL (ref 7.4–10.4)
POCT GLUCOSE: 101 MG/DL (ref 70–110)
POCT GLUCOSE: 113 MG/DL (ref 70–110)
POTASSIUM SERPL-SCNC: 3.5 MMOL/L (ref 3.5–5.1)
PROT SERPL-MCNC: 6.6 GM/DL (ref 5.8–7.6)
RBC # BLD AUTO: 3.97 X10(6)/MCL (ref 4.2–5.4)
SARS-COV-2 RNA RESP QL NAA+PROBE: NOT DETECTED
SODIUM SERPL-SCNC: 141 MMOL/L (ref 136–145)
WBC # SPEC AUTO: 8.9 X10(3)/MCL (ref 4.5–11.5)

## 2022-09-01 PROCEDURE — A4216 STERILE WATER/SALINE, 10 ML: HCPCS | Performed by: INTERNAL MEDICINE

## 2022-09-01 PROCEDURE — 80053 COMPREHEN METABOLIC PANEL: CPT | Performed by: FAMILY MEDICINE

## 2022-09-01 PROCEDURE — 25000242 PHARM REV CODE 250 ALT 637 W/ HCPCS: Performed by: FAMILY MEDICINE

## 2022-09-01 PROCEDURE — 83735 ASSAY OF MAGNESIUM: CPT | Performed by: FAMILY MEDICINE

## 2022-09-01 PROCEDURE — 85025 COMPLETE CBC W/AUTO DIFF WBC: CPT | Performed by: FAMILY MEDICINE

## 2022-09-01 PROCEDURE — 99900035 HC TECH TIME PER 15 MIN (STAT)

## 2022-09-01 PROCEDURE — 94640 AIRWAY INHALATION TREATMENT: CPT

## 2022-09-01 PROCEDURE — 36415 COLL VENOUS BLD VENIPUNCTURE: CPT | Performed by: FAMILY MEDICINE

## 2022-09-01 PROCEDURE — 83880 ASSAY OF NATRIURETIC PEPTIDE: CPT | Performed by: FAMILY MEDICINE

## 2022-09-01 PROCEDURE — 94761 N-INVAS EAR/PLS OXIMETRY MLT: CPT

## 2022-09-01 PROCEDURE — 84100 ASSAY OF PHOSPHORUS: CPT | Performed by: FAMILY MEDICINE

## 2022-09-01 PROCEDURE — 87635 SARS-COV-2 COVID-19 AMP PRB: CPT | Performed by: FAMILY MEDICINE

## 2022-09-01 PROCEDURE — 97530 THERAPEUTIC ACTIVITIES: CPT

## 2022-09-01 PROCEDURE — 25000003 PHARM REV CODE 250: Performed by: INTERNAL MEDICINE

## 2022-09-01 PROCEDURE — 63700000 PHARM REV CODE 250 ALT 637 W/O HCPCS: Performed by: INTERNAL MEDICINE

## 2022-09-01 PROCEDURE — 25000003 PHARM REV CODE 250: Performed by: FAMILY MEDICINE

## 2022-09-01 PROCEDURE — 27000221 HC OXYGEN, UP TO 24 HOURS

## 2022-09-01 RX ORDER — GABAPENTIN 100 MG/1
100 CAPSULE ORAL 2 TIMES DAILY
Qty: 60 CAPSULE | Refills: 11 | Status: ON HOLD | OUTPATIENT
Start: 2022-09-01 | End: 2023-08-22 | Stop reason: HOSPADM

## 2022-09-01 RX ORDER — BUDESONIDE 0.5 MG/2ML
0.5 INHALANT ORAL EVERY 12 HOURS
Refills: 0
Start: 2022-09-01 | End: 2023-09-01

## 2022-09-01 RX ORDER — ACETAMINOPHEN 500 MG
500 TABLET ORAL EVERY 6 HOURS PRN
Refills: 0
Start: 2022-09-01

## 2022-09-01 RX ORDER — METOLAZONE 5 MG/1
5 TABLET ORAL DAILY
Qty: 30 TABLET | Refills: 11 | Status: ON HOLD | OUTPATIENT
Start: 2022-09-02 | End: 2023-08-22 | Stop reason: HOSPADM

## 2022-09-01 RX ORDER — DILTIAZEM HYDROCHLORIDE 30 MG/1
30 TABLET, FILM COATED ORAL EVERY 6 HOURS
Qty: 120 TABLET | Refills: 11 | Status: ON HOLD | OUTPATIENT
Start: 2022-09-01 | End: 2023-08-02 | Stop reason: HOSPADM

## 2022-09-01 RX ORDER — AZITHROMYCIN 500 MG/1
500 TABLET, FILM COATED ORAL DAILY
Qty: 2 TABLET | Refills: 0 | Status: SHIPPED | OUTPATIENT
Start: 2022-09-01 | End: 2022-09-03

## 2022-09-01 RX ORDER — FLUTICASONE PROPIONATE 50 MCG
2 SPRAY, SUSPENSION (ML) NASAL DAILY
Refills: 0 | Status: ON HOLD
Start: 2022-09-02 | End: 2023-08-21

## 2022-09-01 RX ORDER — IPRATROPIUM BROMIDE AND ALBUTEROL SULFATE 2.5; .5 MG/3ML; MG/3ML
3 SOLUTION RESPIRATORY (INHALATION)
Qty: 75 ML | Refills: 0 | Status: SHIPPED | OUTPATIENT
Start: 2022-09-01 | End: 2024-01-18

## 2022-09-01 RX ORDER — CARVEDILOL 6.25 MG/1
6.25 TABLET ORAL 2 TIMES DAILY
Qty: 60 TABLET | Refills: 11 | Status: ON HOLD | OUTPATIENT
Start: 2022-09-01 | End: 2022-12-14 | Stop reason: HOSPADM

## 2022-09-01 RX ADMIN — AZITHROMYCIN MONOHYDRATE 500 MG: 250 TABLET ORAL at 09:09

## 2022-09-01 RX ADMIN — GABAPENTIN 100 MG: 100 CAPSULE ORAL at 09:09

## 2022-09-01 RX ADMIN — BUDESONIDE INHALATION 0.5 MG: 0.5 SUSPENSION RESPIRATORY (INHALATION) at 07:09

## 2022-09-01 RX ADMIN — DILTIAZEM HYDROCHLORIDE 30 MG: 30 TABLET, FILM COATED ORAL at 11:09

## 2022-09-01 RX ADMIN — LACTOBACILLUS TAB 1 TABLET: TAB at 09:09

## 2022-09-01 RX ADMIN — METOLAZONE 5 MG: 2.5 TABLET ORAL at 09:09

## 2022-09-01 RX ADMIN — FLUOXETINE 40 MG: 10 CAPSULE ORAL at 09:09

## 2022-09-01 RX ADMIN — LACTOBACILLUS TAB 1 TABLET: TAB at 11:09

## 2022-09-01 RX ADMIN — SODIUM CHLORIDE, PRESERVATIVE FREE 10 ML: 5 INJECTION INTRAVENOUS at 05:09

## 2022-09-01 RX ADMIN — IPRATROPIUM BROMIDE AND ALBUTEROL SULFATE 3 ML: 2.5; .5 SOLUTION RESPIRATORY (INHALATION) at 07:09

## 2022-09-01 RX ADMIN — PANTOPRAZOLE SODIUM 40 MG: 40 TABLET, DELAYED RELEASE ORAL at 09:09

## 2022-09-01 RX ADMIN — TORSEMIDE 20 MG: 20 TABLET ORAL at 09:09

## 2022-09-01 RX ADMIN — CARVEDILOL 6.25 MG: 6.25 TABLET, FILM COATED ORAL at 09:09

## 2022-09-01 RX ADMIN — DILTIAZEM HYDROCHLORIDE 30 MG: 30 TABLET, FILM COATED ORAL at 12:09

## 2022-09-01 RX ADMIN — IPRATROPIUM BROMIDE AND ALBUTEROL SULFATE 3 ML: 2.5; .5 SOLUTION RESPIRATORY (INHALATION) at 01:09

## 2022-09-01 RX ADMIN — APIXABAN 5 MG: 5 TABLET, FILM COATED ORAL at 09:09

## 2022-09-01 RX ADMIN — LEVOTHYROXINE SODIUM 200 MCG: 0.1 TABLET ORAL at 09:09

## 2022-09-01 RX ADMIN — FLUTICASONE PROPIONATE 100 MCG: 50 SPRAY, METERED NASAL at 09:09

## 2022-09-01 RX ADMIN — DILTIAZEM HYDROCHLORIDE 30 MG: 30 TABLET, FILM COATED ORAL at 05:09

## 2022-09-01 NOTE — PROGRESS NOTES
[facility]                     [address] [address]    PATIENT NAME:       AVELINO ARCHIBALD  YOB: 1945  CSN:                [id number]   MRN:                [id number]  ADMIT DATE:         08/27/2022 20:03:00  PHYSICIAN:          Ludin Velasco MD                            PROGRESS NOTE    DATE:  08/30/2022 00:00:00    She remains in the hospital secondary to her CHF.  Prior to hospitalization, it   was felt she had a pneumonia.  However, chest x-ray has shown no such pneumonia.    She does continue with some wheezing and she is on breathing treatments.  We   are diuresing her as she does still urinate some.    PHYSICAL EXAMINATION:  VITAL SIGNS:  Stable.  She is afebrile.  She has had some elevated pulse rate,   however.    CARDIAC:  Heart currently is irregularly irregular with a controlled rate.  LUNGS:  Clear to auscultation bilaterally.    ABDOMEN:  Nontender, nondistended, obese.  EXTREMITIES:  1+ lower extremity edema.    ASSESSMENT:    1. Congestive heart failure.  2. End-stage chronic renal insufficiency, currently on dialysis.  3. Bronchitis.  4. Rapid atrial fibrillation.  5. Echo pending.  6. Obesity.  7. Multiple other medical problems as per H and P and problem list.    PLAN:    1. Continue current care.    2. Follow up in the morning.    3. Will dialyze again tomorrow.      ______________________________  Ludin Velasco MD    PBS/AQS  DD:  08/31/2022  Time:  06:41PM  DT:  08/31/2022  Time:  07:15PM  Job #:  [id number]      PROGRESS NOTE

## 2022-09-01 NOTE — PT/OT/SLP PROGRESS
Physical Therapy Treatment    Patient Name:  Danae Sanchez   MRN:  [id number]    Recommendations:     Discharge Recommendations:  nursing facility, skilled   Discharge Equipment Recommendations:     Barriers to discharge: None    Assessment:     Danae Sanchez is a 77 y.o. female admitted with a medical diagnosis of Acute systolic congestive heart failure.  She presents with the following impairments/functional limitations:    .    Pt sitting on couch, no longer requiring O2. Pt's HR  at rest, SPO2 upper 90's. She ambulated 80' in her room with her HR increasing to 150's, recovered with rest. Pt's SPO2 remained WNL. Encouraged pt in BLE ROM/exercises throughout the day.    Rehab Prognosis: Good; patient would benefit from acute skilled PT services to address these deficits and reach maximum level of function.    Recent Surgery: * No surgery found *      Plan:     During this hospitalization, patient to be seen daily to address the identified rehab impairments via gait training, therapeutic activities, therapeutic exercises and progress toward the following goals:    Plan of Care Expires:  09/26/22    Subjective     Chief Complaint: SOB  Patient/Family Comments/goals: to return home  Pain/Comfort:         Objective:     Communicated with patient prior to session.  Patient found up in chair with   upon PT entry to room.     General Precautions: Standard, fall   Orthopedic Precautions:    Braces:    Respiratory Status: Room air     Functional Mobility:  Transfers:     Sit to Stand:  minimum assistance with rolling walker  Bed to Chair: contact guard assistance with  rolling walker  using  Step Transfer  Gait: Pt ambulated 80ft in room with CGA and RW  Balance: CGA static standing with RW      AM-PAC 6 CLICK MOBILITY          Therapeutic Activities and Exercises:   See above    Patient left up in chair with all lines intact, call button in reach, and nursing present..    GOALS:   Multidisciplinary Problems        Physical Therapy Goals          Problem: Physical Therapy    Goal Priority Disciplines Outcome Goal Variances Interventions   Physical Therapy Goal     PT, PT/OT Ongoing, Progressing     Description: Goals to be met by: 22     Patient will increase functional independence with mobility by performin. Supine to sit with Modified [place]  2. Sit to stand transfer with Stand-by Assistance  3. Gait  x 150 feet with Stand-by Assistance using Rolling Walker.                          Time Tracking:     PT Received On: 2022  PT Start Time: 1110  PT Stop Time: 1130  PT Total Time (min): 20 min    Billable Minutes: Therapeutic Activity 2022

## 2022-09-01 NOTE — PROGRESS NOTES
[facility]                     [address] [address]    PATIENT NAME:       AVELINO ARCHIBALD  YOB: 1945  CSN:                [id number]   MRN:                [id number]  ADMIT DATE:         08/27/2022 20:03:00  PHYSICIAN:          Ludin Velasco MD                            PROGRESS NOTE    DATE:  08/31/2022 00:00:00    She was dialyzed today.  She is feeling better.  Her pulse ox is satisfactory;   however, she has been on nasal cannula, and we will go ahead and D/C that this   afternoon.  She continues with wheezing.  Her chest x-ray was repeated; showed   cardiomegaly, but no pneumonia.  She states she feels much better.    PHYSICAL EXAMINATION:  VITAL SIGNS:  Stable.  She is afebrile, except her pulse is up to 122.  EKG   shows rapid ventricular rate with AFib.  Pulse ox is 97% on 2 L of oxygen per   nasal cannula as stated below.  Her echocardiogram came back showing an EF of   53%.  She has multiple valvular abnormalities and she has a PA pressure of 37.    Labs, x-rays, medications are on the chart and reviewed.    HEART:  Irregularly irregular with a controlled rate.    LUNGS:  Clear to auscultation except for some minimal expiratory wheezes.    ABDOMEN:  Obese, nontender.  EXTREMITIES:  No significant changes since yesterday.    ASSESSMENT:    1. Congestive heart failure with atrial fibrillation and rapid ventricular rate.  2. Elevated pulmonary artery pressure.  3. End-stage chronic renal insufficiency, on dialysis.  4. Obesity.  5. Bronchitis.  6. Multiple other medical problems, as per problem list.    PLAN:  We got a chest x-ray and EKG today.  We will check a BMP in the morning,   and recheck a.m. labs.  Increase Coreg.  Follow up in the morning.      ______________________________  Ludin Velasco MD    PBS/AQS  DD:  08/31/2022  Time:  06:45PM  DT:  08/31/2022  Time:  06:56PM  Job #:   [id number]      PROGRESS NOTE

## 2022-09-02 LAB
BACTERIA BLD CULT: NORMAL
BACTERIA BLD CULT: NORMAL

## 2022-09-09 NOTE — PHYSICIAN QUERY
PT Name: Danae Sanchez  MR #: [id number]     DOCUMENTATION CLARIFICATION     CDS/: Rhonda Lamas               Contact information:[email or web address]  This form is a permanent document in the medical record.     Query Date: September 9, 2022    By submitting this query, we are merely seeking further clarification of documentation.  Please utilize your independent clinical judgment when addressing the question(s) below.    The Medical Record contains the following   Indicators Supporting Clinical Findings Location in Medical Record   x Heart Failure documented CHF HM note 8-29   x BNP BLP=444.4 Lab 8-27   x EF/Echo The left ventricle is normal in size with mild concentric hypertrophy and low normal systolic function.  The estimated ejection fraction is 53%.  Aortic valve sclerosis without stenosis. Peak AV velocity 1.7 m/sec.  Mild mitral regurgitation.  Mild tricuspid regurgitation.  Atrial fibrillation observed.  Moderate left atrial enlargement.  Intermediate central venous pressure (8 mmHg).  The estimated PA systolic pressure is 37 mmHg.   Echo 8-29    Radiology findings      Subjective/Objective Respiratory Conditions      Recent/Current MI      Heart Transplant, LVAD      Edema, JVD      Ascites     x Diuretics/Meds IV Furosemide Mar 8-27 to 8-28    Other Treatment     x Other  Congestive heart failure secondary to volume overload from her end-stage   renal disease and also from her rapid atrial fibrillation with known underlying   diastolic and systolic dysfunction HM note 8-29     Heart failure is a clinical diagnosis which includes symptomatic fluid retention, elevated intracardiac pressures, and/or the inability of the heart to deliver adequate blood flow.    Heart Failure with reduced Ejection Fraction (HFrEF) or Systolic Heart Failure (loses ability to contract normally, EF is <40%)    Heart Failure with preserved Ejection Fraction (HFpEF) or Diastolic Heart Failure (stiff  ventricles, does not relax properly, EF is >50%)     Heart Failure with Combined Systolic and Diastolic Failure (stiff ventricles, does not relax properly and EF is <50%)    Mid-range or mildly reduced ejection fraction (HFmrEF) is classified as systolic heart failure.  Congestive heart failure with a recovered EF is classified as Diastolic Heart Failure.  Common clues to acute exacerbation:  Rapidly progressive symptoms (w/in 2 weeks of presentation), using IV diuretics, using supplemental O2, pulmonary edema on Xray, new or worsening pleural effusion, +JVD or other signs of volume overload, MI w/in 4 weeks, and/or BNP >500  The clinical guidelines noted are only system guidelines, and do not replace the providers clinical judgment.    Provider, please specify the diagnosis associated with the above clinical findings.    [  x ]  Acute Diastolic Heart Failure (HFpEF) - new diagnosis   [   ]  Acute Combined Systolic and Diastolic Heart Failure - new diagnosis   [   ]  Other (please specify): ___________________________________   [  ]  Clinically Undetermined       Present on admission (POA) status:  [ x  ]  Yes (Y)   [   ]  No (N)   [   ]  Documentation insufficient to determine if condition is POA (U)   [  ]  Clinically Undetermined (W)     Please document in your progress notes daily for the duration of treatment until resolved and include in your discharge summary.    References:  American Heart Association editorial staff. (2017, May). Ejection Fraction Heart Failure Measurement. American Heart Association. https://www.heart.org/en/health-topics/heart-failure/diagnosing-heart-failure/ejection-fraction-heart-failure-measurement#:~:text=Ejection%20fraction%20(EF)%20is%20a,pushed%20out%20with%20each%20heartbeat  NIHARIKA Goode (2020, December 15). Heart failure with preserved ejection fraction: Clinical manifestations and diagnosis. UpToDate.  https://www.[employer]/contents/heart-failure-with-preserved-ejection-fraction-clinical-manifestations-and-diagnosis.  ICD-10-CM/PCS Coding Clinic Third Quarter ICD-10, Effective with discharges: September 8, 2020 [facility] [place] § Heart failure with mid-range or mildly reduced ejection fraction (2020).  ICD-10-CM/PCS Coding Clinic Third Quarter ICD-10, Effective with discharges: September 8, 2020 [facility] § Heart failure with recovered ejection fraction (2020).  Form No. [id number]

## 2022-09-09 NOTE — PHYSICIAN QUERY
PT Name: Danae Sanchez  MR #: [id number]     DOCUMENTATION CLARIFICATION      CDS/: Rhonda Lamas               Contact information:[email or web address]    This form is a permanent document in the medical record.     Query Date: September 9, 2022    Dear Provider,  By submitting this query, we are merely seeking further clarification of documentation.  Please utilize your independent clinical judgment when addressing the question(s) below.     The Medical Record contains the following:    Supporting Clinical Findings Location in Medical Record   She remains in the hospital secondary to her CHF.  Prior to hospitalization, it   was felt she had a pneumonia  note 8-31       Shortness of breath going on for 24 hours or so, she had a chest x-ray done today which was reported to have pneumonia and the lower lobes, she was started on Rocephin and Zithromax   ED MD   Prior to hospitalization, it   as felt she had a pneumonia.  However, chest x-ray has shown no such pneumonia.  She does continue with some wheezing and she is on breathing treatments  note 8-30     Please clarify if the Pneumonia diagnosis has been:    [  ] Ruled In   [  x] Ruled Out   [  ] Other/Clarification of findings (please specify): _______________    [   ] Clinically undetermined     Please document in your progress notes daily for the duration of treatment, until resolved, and include in your discharge summary.    Form No. [id number]

## 2022-09-14 NOTE — PROCEDURES
Patient:   Danae Sanchez            MRN: [id number]            FIN: [id number]               Age:   77 years     Sex:  Female     :  1945   Associated Diagnoses:   None   Author:   Doris FARRAR MD, Jake      Type of Procedure: Cervical Medial Branch Block with Conventional Radiofrequency Ablation   Right C2-4 MB RFA    Physician: Diego George III, MD    Diagnosis: Cervical spondylosis without myelopathy    Description of Procedure:  After appropriate informed consent discussing the risks, benefits and possible complications, the patient was taken to the procedure suite. NIBP, pulse rate, and oxygen saturation were monitored throughout the procedure.      Patient was placed in the lateral decubitus position. Skin was prepped and draped in a sterile fashion. Oblique view of the spine was obtained with fluoroscopy. Entry sites were marked over the skin and 1cc of Lidocaine 1% was used to anesthetize the skin and subcutaneous tissues at each level. A 22-gauge 2.5-inch curved tip, insulated, RF needle was introduced at an angle, and the needle was placed onto the lateral aspect of the mid articular pillar at each level. Placement was confirmed with a fluoroscopic view. The final position of the needles followed the path of the medial branch nerve at the mid-articular pillar at the following levels: Right C2-4 MB    No motor stimulation was elicited at any level at 2V with a frequency of 2Hz.  Then 0.5cc of 2% lidocaine was injected at each level. Thermal RF was then conducted at each level at 80 degrees for 1:30 minutes. At the conclusion, 5mg (0.5cc) of dexamethasone was distributed between the needles.     The needles were re-styletted and removed. Adhesive dressing was applied over the site.  Patient tolerated the procedure well without any apparent complications. The patient was transferred back to the recovery area and then discharged home.

## 2022-09-14 NOTE — PROCEDURES
Patient:   Danae Sanchez            MRN: [id number]            FIN: [id number]               Age:   77 years     Sex:  Female     :  1945   Associated Diagnoses:   None   Author:   Doris FARRAR MD, Jake      Type of Procedure: Cervical Medial Branch Block    Physician: Diego George III, MD    Diagnosis: Cervical spondylosis without myelopathy    Description of Procedure:  After appropriate informed consent discussing the risks, benefits and possible complications, the patient was taken to the procedure suite. NIBP, pulse rate, and oxygen saturation were monitored throughout the procedure.     Patient was placed in the lateral decubitus position with the neck exposed. Skin was prepped and draped in a sterile fashion. Lateral view of the cervical spine was obtained with fluoroscopy. Entry sites were marked over the skin. 1cc of Lidocaine 1% was infiltrated locally over each entry site. A 22-gauge spinal needle was introduced at an angle, and the needle was placed onto the lateral aspect of the mid articular pillar. Placement was confirmed with a fluoroscopic view. The final position of the needles followed the path of the medial branch nerve at the mid-articular pillar at the levels specified. Aspiration was performed at each level to confirm needle was not intravascular. 0.3 cc of 2% lidocaine was injected at each level. Procedure performed at the levels indicated: Bilateral C2-4 MB    The needles were re-styletted and removed. Adhesive dressing was applied over the site. Patient tolerated the procedure well without any apparent complications. The patient was transferred back to the recovery area and then discharged home.

## 2022-09-14 NOTE — PROCEDURES
Patient:   Danae Sanchez            MRN: [id number]            FIN: [id number]               Age:   77 years     Sex:  Female     :  1945   Associated Diagnoses:   None   Author:   Doris FARRAR MD, Jake      Type of Procedure: Cervical Medial Branch Block    Physician: Diego George III, MD    Diagnosis: Cervical spondylosis without myelopathy    Description of Procedure:  After appropriate informed consent discussing the risks, benefits and possible complications, the patient was taken to the procedure suite. NIBP, pulse rate, and oxygen saturation were monitored throughout the procedure.     Patient was placed in the lateral decubitus position with the neck exposed. Skin was prepped and draped in a sterile fashion. Lateral view of the cervical spine was obtained with fluoroscopy. Entry sites were marked over the skin. 1cc of Lidocaine 1% was infiltrated locally over each entry site. A 22-gauge spinal needle was introduced at an angle, and the needle was placed onto the lateral aspect of the mid articular pillar. Placement was confirmed with a fluoroscopic view. The final position of the needles followed the path of the medial branch nerve at the mid-articular pillar at the levels specified. Aspiration was performed at each level to confirm needle was not intravascular. 0.3 cc of 2% lidocaine was injected at each level. Procedure performed at the levels indicated: Bilateral C2-4 MB    The needles were re-styletted and removed. Adhesive dressing was applied over the site. Patient tolerated the procedure well without any apparent complications. The patient was transferred back to the recovery area and then discharged home.

## 2022-09-15 NOTE — DISCHARGE SUMMARY
[facility]                     [address] [facility] [address]    PATIENT NAME:       AVELINO ARCHIBALD  YOB: 1945  CSN:                [id number]   MRN:                [id number]  ADMIT DATE:         08/27/2022 20:03:00  PHYSICIAN:          Ludin Velasco MD                          DISCHARGE SUMMARY    DATE OF DISCHARGE:  09/01/2022 17:02:00    HISTORY:  She is a nursing home patient of mine who has been more short of   breath, and they were treating her for pneumonia/bronchitis at the nursing home.    Unfortunately, her condition worsened.  She is a dialysis patient also.    HOSPITAL COURSE:  On admit, she was diagnosed with congestive heart failure with   AFib/rapid ventricular rate.  Pneumonia was ruled out by chest x-ray.  She was   given antibiotics, however, with breathing treatments for her bronchitis.  She   was dialyzed, as stated above, and she also has underlying diabetes, obstructive   sleep apnea.  She did use her CPAP at night.  Echocardiogram was done showing   an elevated PA pressure.  She also had some mild mitral and tricuspid   regurgitation.  She had moderate left atrial enlargement and a sclerotic aortic   valve without stenosis.  Her estimated ejection fraction was 53%.  Diabetes was   controlled with sliding scale.  Hypertension was controlled with dialysis and   her medications, of course.  She did have an elevated alkaline phosphatase and   white blood cell count on admit.  These improved.  The remainder of her hospital   course was uncomplicated, and she was able to be discharged back to the nursing   home on the 1st of September.    ASSESSMENT:    1. Atrial fibrillation with rapid ventricular rate.  2. Congestive heart failure with underlying systolic dysfunction.  3. Pneumonia ruled out.  She had bronchitis with hypoxia.  4. End-stage renal disease requiring dialysis.  5. Diabetes.  6.  Hypertension.  7. Obstructive sleep apnea, using her continuous positive airway pressure.  8. Elevated pulmonary artery pressure.  9. Obesity.  10. Elevated white count.  11. Elevated alkaline phosphatase.    PLAN:    1. Discharge patient to nursing home.    2. Continue outpatient dialysis.    3. Continue:      a. Lipitor 40 mg 1 p.o. daily.     b. Eliquis 2.5 mg twice a day.     c. Fluoxetine 40 mg 1 p.o. daily.     d. Humulin 70/30 insulin 15 units twice a day prior to meals.  Routine   sliding scale at nursing home.     e. Lactobacillus 500 million units daily.     f. Levothyroxine 200 mcg 1 p.o. daily.     g. Claritin 10 mg 1 p.o. daily.     h. Melatonin 6 mg q.h.s.     i. Multivitamin 1 p.o. daily.     j. PreviDent 5000+ as prior to hospitalization.     k. Requip 0.5 mg at night.     l. Demadex 20 mg daily.     m. Sonata 10 mg nightly for sleep.     n. Tylenol 500 mg every 6 hours p.r.n. pain.     o. Neb treatments with DuoNeb every 6 hours while awake.     p. Budesonide neb treatment twice a day.     q. Coreg 6.25 mg twice a day.     r. Diltiazem 30 mg every 6 hours.       s. Fluticasone 2 sprays by nostrils daily.     t. Gabapentin 100 mg twice a day.     u. Metolazone 5 mg once a day.     v. Protonix 40 mg 1 p.o. daily.        4. Activity as tolerated.  She is mainly wheelchair and bed bound.    5. Continue diabetic and cardiac diet, renal diet.    6. Follow up with me in 1 week.        ______________________________  Ludin Velasco MD    PBS/AQS  DD:  09/15/2022  Time:  10:20AM  DT:  09/15/2022  Time:  11:18AM  Job #:  [id number]    cc:   MD Dr. Pratibha Cardenas        DISCHARGE SUMMARY

## 2022-12-12 ENCOUNTER — HOSPITAL ENCOUNTER (OUTPATIENT)
Facility: HOSPITAL | Age: 77
End: 2022-12-14
Attending: FAMILY MEDICINE | Admitting: FAMILY MEDICINE
Payer: MEDICARE

## 2022-12-12 DIAGNOSIS — I48.91 ATRIAL FIBRILLATION: ICD-10-CM

## 2022-12-12 DIAGNOSIS — R07.9 CHEST PAIN: ICD-10-CM

## 2022-12-12 DIAGNOSIS — I50.9 ACUTE CONGESTIVE HEART FAILURE, UNSPECIFIED HEART FAILURE TYPE: ICD-10-CM

## 2022-12-12 DIAGNOSIS — I48.91 ATRIAL FIBRILLATION WITH RVR: Primary | ICD-10-CM

## 2022-12-12 DIAGNOSIS — I48.91 A-FIB: ICD-10-CM

## 2022-12-12 LAB
ALBUMIN SERPL-MCNC: 3.9 GM/DL (ref 3.4–4.8)
ALBUMIN/GLOB SERPL: 1.1 RATIO (ref 1.1–2)
ALP SERPL-CCNC: 215 UNIT/L (ref 40–150)
ALT SERPL-CCNC: 18 UNIT/L (ref 0–55)
AST SERPL-CCNC: 18 UNIT/L (ref 5–34)
BASOPHILS # BLD AUTO: 0.1 X10(3)/MCL (ref 0–0.2)
BASOPHILS NFR BLD AUTO: 1.2 %
BILIRUBIN DIRECT+TOT PNL SERPL-MCNC: 0.7 MG/DL
BUN SERPL-MCNC: 17 MG/DL (ref 9.8–20.1)
CALCIUM SERPL-MCNC: 9.9 MG/DL (ref 8.4–10.2)
CHLORIDE SERPL-SCNC: 103 MMOL/L (ref 98–107)
CO2 SERPL-SCNC: 30 MMOL/L (ref 23–31)
CREAT SERPL-MCNC: 2.45 MG/DL (ref 0.55–1.02)
EOSINOPHIL # BLD AUTO: 0.29 X10(3)/MCL (ref 0–0.9)
EOSINOPHIL NFR BLD AUTO: 3.4 %
ERYTHROCYTE [DISTWIDTH] IN BLOOD BY AUTOMATED COUNT: 14.3 % (ref 11.5–17)
FLUAV AG UPPER RESP QL IA.RAPID: NOT DETECTED
FLUBV AG UPPER RESP QL IA.RAPID: NOT DETECTED
GFR SERPLBLD CREATININE-BSD FMLA CKD-EPI: 20 MLS/MIN/1.73/M2
GLOBULIN SER-MCNC: 3.5 GM/DL (ref 2.4–3.5)
GLUCOSE SERPL-MCNC: 123 MG/DL (ref 82–115)
GLUCOSE SERPL-MCNC: 136 MG/DL (ref 70–110)
HCT VFR BLD AUTO: 40.8 % (ref 37–47)
HGB BLD-MCNC: 12.6 GM/DL (ref 12–16)
IMM GRANULOCYTES # BLD AUTO: 0.02 X10(3)/MCL (ref 0–0.04)
IMM GRANULOCYTES NFR BLD AUTO: 0.2 %
LYMPHOCYTES # BLD AUTO: 1.51 X10(3)/MCL (ref 0.6–4.6)
LYMPHOCYTES NFR BLD AUTO: 17.6 %
MAGNESIUM SERPL-MCNC: 2.6 MG/DL (ref 1.6–2.6)
MCH RBC QN AUTO: 30.7 PG (ref 27–31)
MCHC RBC AUTO-ENTMCNC: 30.9 MG/DL (ref 33–36)
MCV RBC AUTO: 99.3 FL (ref 80–94)
MONOCYTES # BLD AUTO: 0.8 X10(3)/MCL (ref 0.1–1.3)
MONOCYTES NFR BLD AUTO: 9.3 %
NEUTROPHILS # BLD AUTO: 5.9 X10(3)/MCL (ref 2.1–9.2)
NEUTROPHILS NFR BLD AUTO: 68.3 %
PHOSPHATE SERPL-MCNC: 2.2 MG/DL (ref 2.3–4.7)
PLATELET # BLD AUTO: 198 X10(3)/MCL (ref 130–400)
PMV BLD AUTO: 10.2 FL (ref 7.4–10.4)
POCT GLUCOSE: 118 MG/DL (ref 70–110)
POTASSIUM SERPL-SCNC: 4.2 MMOL/L (ref 3.5–5.1)
PROT SERPL-MCNC: 7.4 GM/DL (ref 5.8–7.6)
RBC # BLD AUTO: 4.11 X10(6)/MCL (ref 4.2–5.4)
SARS-COV-2 RNA RESP QL NAA+PROBE: NOT DETECTED
SODIUM SERPL-SCNC: 144 MMOL/L (ref 136–145)
TROPONIN I SERPL-MCNC: 0.02 NG/ML (ref 0–0.04)
TSH SERPL-ACNC: 2.19 UIU/ML (ref 0.35–4.94)
WBC # SPEC AUTO: 8.6 X10(3)/MCL (ref 4.5–11.5)

## 2022-12-12 PROCEDURE — 0240U COVID/FLU A&B PCR: CPT | Performed by: FAMILY MEDICINE

## 2022-12-12 PROCEDURE — 99900031 HC PATIENT EDUCATION (STAT)

## 2022-12-12 PROCEDURE — G0378 HOSPITAL OBSERVATION PER HR: HCPCS

## 2022-12-12 PROCEDURE — 94761 N-INVAS EAR/PLS OXIMETRY MLT: CPT

## 2022-12-12 PROCEDURE — 80053 COMPREHEN METABOLIC PANEL: CPT | Performed by: FAMILY MEDICINE

## 2022-12-12 PROCEDURE — 85025 COMPLETE CBC W/AUTO DIFF WBC: CPT | Performed by: FAMILY MEDICINE

## 2022-12-12 PROCEDURE — 83735 ASSAY OF MAGNESIUM: CPT | Performed by: FAMILY MEDICINE

## 2022-12-12 PROCEDURE — 93010 ELECTROCARDIOGRAM REPORT: CPT | Mod: ,,, | Performed by: STUDENT IN AN ORGANIZED HEALTH CARE EDUCATION/TRAINING PROGRAM

## 2022-12-12 PROCEDURE — 36415 COLL VENOUS BLD VENIPUNCTURE: CPT | Performed by: FAMILY MEDICINE

## 2022-12-12 PROCEDURE — 84443 ASSAY THYROID STIM HORMONE: CPT | Performed by: FAMILY MEDICINE

## 2022-12-12 PROCEDURE — 93005 ELECTROCARDIOGRAM TRACING: CPT

## 2022-12-12 PROCEDURE — 25000242 PHARM REV CODE 250 ALT 637 W/ HCPCS: Performed by: FAMILY MEDICINE

## 2022-12-12 PROCEDURE — 81001 URINALYSIS AUTO W/SCOPE: CPT | Performed by: FAMILY MEDICINE

## 2022-12-12 PROCEDURE — G0379 DIRECT REFER HOSPITAL OBSERV: HCPCS

## 2022-12-12 PROCEDURE — 94640 AIRWAY INHALATION TREATMENT: CPT | Mod: XB

## 2022-12-12 PROCEDURE — 84100 ASSAY OF PHOSPHORUS: CPT | Performed by: FAMILY MEDICINE

## 2022-12-12 PROCEDURE — 84484 ASSAY OF TROPONIN QUANT: CPT | Performed by: FAMILY MEDICINE

## 2022-12-12 PROCEDURE — 93010 EKG 12-LEAD: ICD-10-PCS | Mod: ,,, | Performed by: STUDENT IN AN ORGANIZED HEALTH CARE EDUCATION/TRAINING PROGRAM

## 2022-12-12 PROCEDURE — 25000003 PHARM REV CODE 250: Performed by: FAMILY MEDICINE

## 2022-12-12 PROCEDURE — 94640 AIRWAY INHALATION TREATMENT: CPT

## 2022-12-12 RX ORDER — SODIUM CHLORIDE 0.9 % (FLUSH) 0.9 %
10 SYRINGE (ML) INJECTION EVERY 12 HOURS PRN
Status: DISCONTINUED | OUTPATIENT
Start: 2022-12-12 | End: 2022-12-14 | Stop reason: HOSPADM

## 2022-12-12 RX ORDER — ACETAMINOPHEN 500 MG
500 TABLET ORAL EVERY 6 HOURS PRN
Status: DISCONTINUED | OUTPATIENT
Start: 2022-12-12 | End: 2022-12-14 | Stop reason: HOSPADM

## 2022-12-12 RX ORDER — NALOXONE HCL 0.4 MG/ML
0.02 VIAL (ML) INJECTION
Status: DISCONTINUED | OUTPATIENT
Start: 2022-12-12 | End: 2022-12-14 | Stop reason: HOSPADM

## 2022-12-12 RX ORDER — CARVEDILOL 12.5 MG/1
12.5 TABLET ORAL 2 TIMES DAILY
Status: DISCONTINUED | OUTPATIENT
Start: 2022-12-12 | End: 2022-12-13

## 2022-12-12 RX ORDER — ROPINIROLE 0.25 MG/1
0.5 TABLET, FILM COATED ORAL NIGHTLY
Status: DISCONTINUED | OUTPATIENT
Start: 2022-12-12 | End: 2022-12-14 | Stop reason: HOSPADM

## 2022-12-12 RX ORDER — BUDESONIDE 0.5 MG/2ML
0.5 INHALANT ORAL EVERY 12 HOURS
Status: DISCONTINUED | OUTPATIENT
Start: 2022-12-12 | End: 2022-12-14 | Stop reason: HOSPADM

## 2022-12-12 RX ORDER — TALC
6 POWDER (GRAM) TOPICAL DAILY
Status: DISCONTINUED | OUTPATIENT
Start: 2022-12-12 | End: 2022-12-14 | Stop reason: HOSPADM

## 2022-12-12 RX ORDER — IPRATROPIUM BROMIDE AND ALBUTEROL SULFATE 2.5; .5 MG/3ML; MG/3ML
3 SOLUTION RESPIRATORY (INHALATION)
Status: DISCONTINUED | OUTPATIENT
Start: 2022-12-12 | End: 2022-12-14 | Stop reason: HOSPADM

## 2022-12-12 RX ORDER — IBUPROFEN 200 MG
24 TABLET ORAL
Status: DISCONTINUED | OUTPATIENT
Start: 2022-12-12 | End: 2022-12-14 | Stop reason: HOSPADM

## 2022-12-12 RX ORDER — GLUCAGON 1 MG
1 KIT INJECTION
Status: DISCONTINUED | OUTPATIENT
Start: 2022-12-12 | End: 2022-12-14 | Stop reason: HOSPADM

## 2022-12-12 RX ORDER — ROPINIROLE 1 MG/1
1 TABLET, FILM COATED ORAL DAILY
Status: DISCONTINUED | OUTPATIENT
Start: 2022-12-12 | End: 2022-12-14 | Stop reason: HOSPADM

## 2022-12-12 RX ORDER — METOLAZONE 2.5 MG/1
5 TABLET ORAL DAILY
Status: DISCONTINUED | OUTPATIENT
Start: 2022-12-13 | End: 2022-12-14 | Stop reason: HOSPADM

## 2022-12-12 RX ORDER — LEVOTHYROXINE SODIUM 100 UG/1
200 TABLET ORAL DAILY
Status: DISCONTINUED | OUTPATIENT
Start: 2022-12-13 | End: 2022-12-14 | Stop reason: HOSPADM

## 2022-12-12 RX ORDER — FLUOXETINE 10 MG/1
40 CAPSULE ORAL DAILY
Status: DISCONTINUED | OUTPATIENT
Start: 2022-12-13 | End: 2022-12-14 | Stop reason: HOSPADM

## 2022-12-12 RX ORDER — IBUPROFEN 200 MG
16 TABLET ORAL
Status: DISCONTINUED | OUTPATIENT
Start: 2022-12-12 | End: 2022-12-14 | Stop reason: HOSPADM

## 2022-12-12 RX ORDER — GABAPENTIN 100 MG/1
100 CAPSULE ORAL 2 TIMES DAILY
Status: DISCONTINUED | OUTPATIENT
Start: 2022-12-12 | End: 2022-12-14 | Stop reason: HOSPADM

## 2022-12-12 RX ORDER — PANTOPRAZOLE SODIUM 40 MG/1
40 TABLET, DELAYED RELEASE ORAL DAILY
Status: DISCONTINUED | OUTPATIENT
Start: 2022-12-13 | End: 2022-12-14 | Stop reason: HOSPADM

## 2022-12-12 RX ORDER — INSULIN ASPART 100 [IU]/ML
0-5 INJECTION, SOLUTION INTRAVENOUS; SUBCUTANEOUS
Status: DISCONTINUED | OUTPATIENT
Start: 2022-12-12 | End: 2022-12-14 | Stop reason: HOSPADM

## 2022-12-12 RX ORDER — ZOLPIDEM TARTRATE 5 MG/1
10 TABLET ORAL NIGHTLY
Status: DISCONTINUED | OUTPATIENT
Start: 2022-12-12 | End: 2022-12-14 | Stop reason: HOSPADM

## 2022-12-12 RX ORDER — TORSEMIDE 10 MG/1
20 TABLET ORAL DAILY
Status: DISCONTINUED | OUTPATIENT
Start: 2022-12-13 | End: 2022-12-14 | Stop reason: HOSPADM

## 2022-12-12 RX ORDER — ATORVASTATIN CALCIUM 40 MG/1
40 TABLET, FILM COATED ORAL DAILY
Status: DISCONTINUED | OUTPATIENT
Start: 2022-12-12 | End: 2022-12-14 | Stop reason: HOSPADM

## 2022-12-12 RX ADMIN — GABAPENTIN 100 MG: 100 CAPSULE ORAL at 09:12

## 2022-12-12 RX ADMIN — IPRATROPIUM BROMIDE AND ALBUTEROL SULFATE 3 ML: .5; 3 SOLUTION RESPIRATORY (INHALATION) at 07:12

## 2022-12-12 RX ADMIN — BUDESONIDE INHALATION 0.5 MG: 0.5 SUSPENSION RESPIRATORY (INHALATION) at 07:12

## 2022-12-12 RX ADMIN — ATORVASTATIN CALCIUM 40 MG: 40 TABLET, FILM COATED ORAL at 05:12

## 2022-12-12 RX ADMIN — ROPINIROLE 0.5 MG: 0.25 TABLET, FILM COATED ORAL at 09:12

## 2022-12-12 RX ADMIN — APIXABAN 2.5 MG: 2.5 TABLET, FILM COATED ORAL at 09:12

## 2022-12-12 RX ADMIN — CARVEDILOL 12.5 MG: 12.5 TABLET, FILM COATED ORAL at 09:12

## 2022-12-12 RX ADMIN — Medication 6 MG: at 09:12

## 2022-12-12 RX ADMIN — ZOLPIDEM TARTRATE 10 MG: 5 TABLET ORAL at 09:12

## 2022-12-12 RX ADMIN — ROPINIROLE HYDROCHLORIDE 1 MG: 1 TABLET, FILM COATED ORAL at 05:12

## 2022-12-13 LAB
AORTIC ROOT ANNULUS: 0 CM
AORTIC VALVE CUSP SEPERATION: 0.84 CM
APPEARANCE UR: ABNORMAL
AV INDEX (PROSTH): 0.44
AV MEAN GRADIENT: 8 MMHG
AV PEAK GRADIENT: 12 MMHG
AV VALVE AREA: 1.31 CM2
AV VELOCITY RATIO: 0.46
BACTERIA #/AREA URNS AUTO: ABNORMAL /HPF
BILIRUB UR QL STRIP.AUTO: ABNORMAL MG/DL
BNP BLD-MCNC: 547.7 PG/ML
COLOR UR AUTO: ABNORMAL
CV ECHO LV RWT: 0.5 CM
DOP CALC AO PEAK VEL: 1.71 M/S
DOP CALC AO VTI: 38.1 CM
DOP CALC LVOT AREA: 3 CM2
DOP CALC LVOT DIAMETER: 1.95 CM
DOP CALC LVOT PEAK VEL: 0.79 M/S
DOP CALC LVOT STROKE VOLUME: 49.85 CM3
DOP CALCLVOT PEAK VEL VTI: 16.7 CM
ECHO LV POSTERIOR WALL: 1.45 CM (ref 0.6–1.1)
EJECTION FRACTION: 50 %
FRACTIONAL SHORTENING: 27 % (ref 28–44)
GLUCOSE UR QL STRIP.AUTO: NEGATIVE MG/DL
HYALINE CASTS URNS QL MICRO: ABNORMAL /LPF
INTERVENTRICULAR SEPTUM: 1.29 CM (ref 0.6–1.1)
KETONES UR QL STRIP.AUTO: 15 MG/DL
LEFT ATRIUM SIZE: 5.08 CM
LEFT INTERNAL DIMENSION IN SYSTOLE: 4.2 CM (ref 2.1–4)
LEFT VENTRICLE DIASTOLIC VOLUME: 165.05 ML
LEFT VENTRICLE SYSTOLIC VOLUME: 78.59 ML
LEFT VENTRICULAR INTERNAL DIMENSION IN DIASTOLE: 5.78 CM (ref 3.5–6)
LEFT VENTRICULAR MASS: 354.51 G
LEUKOCYTE ESTERASE UR QL STRIP.AUTO: ABNORMAL UNIT/L
LVOT MG: 1.51 MMHG
LVOT MV: 0.58 CM/S
NITRITE UR QL STRIP.AUTO: NEGATIVE
PH UR STRIP.AUTO: 5 [PH]
PISA MRMAX VEL: 4.09 M/S
PISA TR MAX VEL: 2.83 M/S
POCT GLUCOSE: 108 MG/DL (ref 70–110)
POCT GLUCOSE: 108 MG/DL (ref 70–110)
POCT GLUCOSE: 136 MG/DL (ref 70–110)
POCT GLUCOSE: 160 MG/DL (ref 70–110)
POCT GLUCOSE: 165 MG/DL (ref 70–110)
PROT UR QL STRIP.AUTO: ABNORMAL MG/DL
PV PEAK VELOCITY: 1.14 CM/S
RBC #/AREA URNS AUTO: ABNORMAL /HPF
RBC UR QL AUTO: NEGATIVE UNIT/L
RIGHT VENTRICULAR END-DIASTOLIC DIMENSION: 3 CM
SP GR UR STRIP.AUTO: 1.02
SQUAMOUS #/AREA URNS AUTO: ABNORMAL /HPF
TR MAX PG: 32 MMHG
UROBILINOGEN UR STRIP-ACNC: 0.2 MG/DL
WBC #/AREA URNS AUTO: ABNORMAL /HPF

## 2022-12-13 PROCEDURE — 36415 COLL VENOUS BLD VENIPUNCTURE: CPT | Performed by: FAMILY MEDICINE

## 2022-12-13 PROCEDURE — 25000242 PHARM REV CODE 250 ALT 637 W/ HCPCS: Performed by: FAMILY MEDICINE

## 2022-12-13 PROCEDURE — 96365 THER/PROPH/DIAG IV INF INIT: CPT | Mod: 59

## 2022-12-13 PROCEDURE — 94640 AIRWAY INHALATION TREATMENT: CPT

## 2022-12-13 PROCEDURE — 25000003 PHARM REV CODE 250: Performed by: FAMILY MEDICINE

## 2022-12-13 PROCEDURE — 63600175 PHARM REV CODE 636 W HCPCS: Performed by: FAMILY MEDICINE

## 2022-12-13 PROCEDURE — A4216 STERILE WATER/SALINE, 10 ML: HCPCS | Performed by: FAMILY MEDICINE

## 2022-12-13 PROCEDURE — 90935 HEMODIALYSIS ONE EVALUATION: CPT

## 2022-12-13 PROCEDURE — G0378 HOSPITAL OBSERVATION PER HR: HCPCS

## 2022-12-13 PROCEDURE — 83880 ASSAY OF NATRIURETIC PEPTIDE: CPT | Performed by: FAMILY MEDICINE

## 2022-12-13 PROCEDURE — 94761 N-INVAS EAR/PLS OXIMETRY MLT: CPT

## 2022-12-13 RX ORDER — METOPROLOL SUCCINATE 50 MG/1
50 TABLET, EXTENDED RELEASE ORAL 2 TIMES DAILY
Status: DISCONTINUED | OUTPATIENT
Start: 2022-12-13 | End: 2022-12-14 | Stop reason: HOSPADM

## 2022-12-13 RX ORDER — DILTIAZEM HYDROCHLORIDE 30 MG/1
30 TABLET, FILM COATED ORAL EVERY 6 HOURS
Status: DISCONTINUED | OUTPATIENT
Start: 2022-12-13 | End: 2022-12-14 | Stop reason: HOSPADM

## 2022-12-13 RX ORDER — CEFTRIAXONE 1 G/1
1 INJECTION, POWDER, FOR SOLUTION INTRAMUSCULAR; INTRAVENOUS
Status: DISCONTINUED | OUTPATIENT
Start: 2022-12-13 | End: 2022-12-13

## 2022-12-13 RX ADMIN — IPRATROPIUM BROMIDE AND ALBUTEROL SULFATE 3 ML: .5; 3 SOLUTION RESPIRATORY (INHALATION) at 07:12

## 2022-12-13 RX ADMIN — GABAPENTIN 100 MG: 100 CAPSULE ORAL at 08:12

## 2022-12-13 RX ADMIN — PANTOPRAZOLE SODIUM 40 MG: 40 TABLET, DELAYED RELEASE ORAL at 08:12

## 2022-12-13 RX ADMIN — ACETAMINOPHEN 500 MG: 500 TABLET, FILM COATED ORAL at 08:12

## 2022-12-13 RX ADMIN — CEFTRIAXONE SODIUM 1 G: 1 INJECTION, POWDER, FOR SOLUTION INTRAMUSCULAR; INTRAVENOUS at 11:12

## 2022-12-13 RX ADMIN — METOPROLOL SUCCINATE 50 MG: 50 TABLET, EXTENDED RELEASE ORAL at 08:12

## 2022-12-13 RX ADMIN — ZOLPIDEM TARTRATE 10 MG: 5 TABLET ORAL at 08:12

## 2022-12-13 RX ADMIN — ROPINIROLE 0.5 MG: 0.25 TABLET, FILM COATED ORAL at 08:12

## 2022-12-13 RX ADMIN — ATORVASTATIN CALCIUM 40 MG: 40 TABLET, FILM COATED ORAL at 05:12

## 2022-12-13 RX ADMIN — Medication 10 ML: at 11:12

## 2022-12-13 RX ADMIN — BUDESONIDE INHALATION 0.5 MG: 0.5 SUSPENSION RESPIRATORY (INHALATION) at 07:12

## 2022-12-13 RX ADMIN — ROPINIROLE HYDROCHLORIDE 1 MG: 1 TABLET, FILM COATED ORAL at 05:12

## 2022-12-13 RX ADMIN — LEVOTHYROXINE SODIUM 200 MCG: 0.1 TABLET ORAL at 08:12

## 2022-12-13 RX ADMIN — FLUOXETINE 40 MG: 10 CAPSULE ORAL at 08:12

## 2022-12-13 RX ADMIN — IPRATROPIUM BROMIDE AND ALBUTEROL SULFATE 3 ML: .5; 3 SOLUTION RESPIRATORY (INHALATION) at 01:12

## 2022-12-13 RX ADMIN — METOLAZONE 5 MG: 2.5 TABLET ORAL at 08:12

## 2022-12-13 RX ADMIN — IPRATROPIUM BROMIDE AND ALBUTEROL SULFATE 3 ML: .5; 3 SOLUTION RESPIRATORY (INHALATION) at 08:12

## 2022-12-13 RX ADMIN — METOPROLOL SUCCINATE 50 MG: 50 TABLET, EXTENDED RELEASE ORAL at 11:12

## 2022-12-13 RX ADMIN — APIXABAN 2.5 MG: 2.5 TABLET, FILM COATED ORAL at 09:12

## 2022-12-13 RX ADMIN — DILTIAZEM HYDROCHLORIDE 30 MG: 30 TABLET, FILM COATED ORAL at 05:12

## 2022-12-13 RX ADMIN — CARVEDILOL 12.5 MG: 12.5 TABLET, FILM COATED ORAL at 08:12

## 2022-12-13 RX ADMIN — APIXABAN 2.5 MG: 2.5 TABLET, FILM COATED ORAL at 08:12

## 2022-12-13 RX ADMIN — BUDESONIDE INHALATION 0.5 MG: 0.5 SUSPENSION RESPIRATORY (INHALATION) at 08:12

## 2022-12-13 RX ADMIN — TORSEMIDE 20 MG: 10 TABLET ORAL at 08:12

## 2022-12-13 RX ADMIN — Medication 6 MG: at 05:12

## 2022-12-13 NOTE — H&P
[facility]                     [address] [id number]    PATIENT NAME:       AVELINO ARCHIBALD  YOB: 1945  CSN:                [id number]   MRN:                [id number]  ADMIT DATE:         2022 15:14:00  PHYSICIAN:          Ludin Velasco MD                        HISTORY AND PHYSICAL      HISTORY OF PRESENT ILLNESS:  She is a 77-year-old female with bilateral lower   extremity edema and shortness of breath.  Recently, her Coreg was stopped in   favor of metoprolol by Dr. Taylor.  She states that since then she has been   more short of breath, and she feels tachycardic.  She is being admitted for AFib   with rapid ventricular rate and possible volume overload with edema of   bilateral lower extremities.    PAST MEDICAL HISTORY:  Significant for end-stage renal disease, currently on   dialysis; AFib; anxiety; coronary artery disease; previous basal cell carcinoma   of her nose; hypertension; depression; type 2 diabetes; ejection fraction less   than 50%/cardiomyopathy; hyperlipidemia; hypothyroidism; lymphoma in the past;   morbid obesity; obstructive sleep apnea; osteoporosis; previous peptic ulcer;   increase risk for falls/debility; osteoarthritis; venous insufficiency,   bilateral lower extremities; vitamin D deficiency and cataracts.  She has   history of CHF as well.  She does have history of bilateral pneumonia as well.    PAST SURGICAL HISTORY:  Includes carpal tunnel release, cholecystectomy,   colonoscopy, hemorrhoid surgery, hysterectomy, partial mole removal which was   malignant in .  She has a left arm fistula for dialysis.    FAMILY MEDICAL HISTORY:  Father had a heart attack.  Mother had congestive heart   failure.  Mother is .  Father is .  Mother also had type 2   diabetes.  Brother had colon cancer, lung neoplasm.    SOCIAL HISTORY:  She never smoked.  She  does not abuse alcohol.  No drugs.  She   is .  She lives at [facility].    ALLERGIES:  INCLUDE CODEINE AND LISINOPRIL, MORPHINE AND CORTISONE.     IMMUNIZATIONS:  Up to date.    REVIEW OF SYSTEMS:  She has the swelling in her legs and shortness of breath with palpitations.  No   chest pain.  No fever, chills.    MEDICATIONS:  Include:  1. Toprol succinate 25 mg twice a day.    2. Combivent neb treatments every 6 hours as needed.    3. Budesonide neb treatment twice a day.    4. Fluticasone 2 sprays each nostril once a day.   5. Probiotic/acidophilus 1 capsule once a day.  6. Protonix 40 mg 1 p.o. q. day.    7. Diltiazem 30 mg 1 q.6 hours.    8. Ammonium lactate to bilateral lower extremities twice a day.    9. Hydroxyzine 50 mg 1 as needed twice a day.    10. Loratadine 10 mg 1 p.o. q. day.    11. Prevident 5000, 1.1% cream as directed orally.    12. Sonata 5 mg at bedtime as needed for sleep.   13. Torsemide 20 mg 1 p.o. every other day.    14. MiraLAX 17 g in 8 ounces of water daily.    15. Metolazone 5 mg 1 p.o. every other day.   16. Atorvastatin 40 mg 1 p.o. q. day.   17. Coreg was discontinued in favor of metoprolol succinate 25 mg twice a day.   18. Synthroid 200 mcg 1 p.o. q. day.    19. Humulin 70/30, 15 units twice a day prior to meals.   20. Eliquis 2.5 mg 1 b.i.d.  21. Zoloft 40 mg 1 p.o. q. day.   22. Multivitamin 1 p.o. q. day.  23. Magnesium oxide 400 mg twice a day.   24. Melatonin 3 mg 2 tablets at bedtime.   25. Sauna lotion as needed externally.  26. Requip 0.5 mg Monday, Wednesday, Friday once a day.   27. Tylenol 500 mg 1, 3 times a day p.r.n. pain.   28. Gabapentin 100 mg 3 times a day.    PHYSICAL EXAMINATION:  GENERAL:  The patient is morbidly obese, in a wheelchair in no acute distress.    She is in no acute distress.    VITAL SIGNS:  She is afebrile.  Heart rate 122 beats per minute, blood pressure   100/68.  She is 311 pounds.  BMI of 48.7.  Respiratory rate 20.  Pulse  ox on   room air is 97%.  This is at rest.    HEENT:  Grossly within normal limits.  Unable to discern her JVD.  Neck   otherwise is supple with full range of motion.    HEART:  Irregularly irregular with an elevated heart rate.    LUNGS:  Clear to auscultation bilaterally.    ABDOMEN:  Nontender, nondistended.  Normoactive bowel sounds.   EXTREMITIES:  2+ edema, bilateral lower extremities with some erythema,   bilateral lower extremities.    :  Deferred at this time.    RECTAL:  Deferred at this time.    BREASTS:  Deferred at this time.    NEUROLOGICAL:  She is alert and oriented x3.  Motor and sensory function intact   except for peripheral neuropathy of her feet.    ASSESSMENT:    1. Atrial fibrillation with rapid ventricular rate, undergoing medication   changes outpatient.  2. Shortness of breath, possibly due to that stated above plus volume overload.  3. Chronic renal insufficiency, currently on dialysis.  4. Morbid obesity.  5. Lower extremity edema, possibly due to volume overload.  6. Underlying cardiomyopathy.  7. Multiple other medical problems as per problem list.    PLAN:  Admit patient to the hospital.  Consult Nephrology.  Discussed with Dr. Taylor and get baseline labs.  Get chest x-ray.  Place O2 in case of shortness   of breath.  Control heart rate.  Place on telemetry.        ______________________________  MD CHRISTINE Cardenas/LEIGHTON  DD:  12/13/2022  Time:  11:40AM  DT:  12/13/2022  Time:  02:12PM  Job #:  [id number]      HISTORY AND PHYSICAL

## 2022-12-14 VITALS
SYSTOLIC BLOOD PRESSURE: 98 MMHG | HEART RATE: 80 BPM | OXYGEN SATURATION: 97 % | RESPIRATION RATE: 20 BRPM | TEMPERATURE: 98 F | DIASTOLIC BLOOD PRESSURE: 57 MMHG

## 2022-12-14 LAB
ALBUMIN SERPL-MCNC: 3.2 GM/DL (ref 3.4–4.8)
ALBUMIN/GLOB SERPL: 1.2 RATIO (ref 1.1–2)
ALP SERPL-CCNC: 166 UNIT/L (ref 40–150)
ALT SERPL-CCNC: 14 UNIT/L (ref 0–55)
AST SERPL-CCNC: 12 UNIT/L (ref 5–34)
BASOPHILS # BLD AUTO: 0.07 X10(3)/MCL (ref 0–0.2)
BASOPHILS NFR BLD AUTO: 1 %
BILIRUBIN DIRECT+TOT PNL SERPL-MCNC: 0.6 MG/DL
BUN SERPL-MCNC: 42 MG/DL (ref 9.8–20.1)
CALCIUM SERPL-MCNC: 8.8 MG/DL (ref 8.4–10.2)
CHLORIDE SERPL-SCNC: 103 MMOL/L (ref 98–107)
CO2 SERPL-SCNC: 27 MMOL/L (ref 23–31)
CREAT SERPL-MCNC: 4.24 MG/DL (ref 0.55–1.02)
EOSINOPHIL # BLD AUTO: 0.34 X10(3)/MCL (ref 0–0.9)
EOSINOPHIL NFR BLD AUTO: 4.7 %
ERYTHROCYTE [DISTWIDTH] IN BLOOD BY AUTOMATED COUNT: 14.2 % (ref 11.5–17)
GFR SERPLBLD CREATININE-BSD FMLA CKD-EPI: 10 MLS/MIN/1.73/M2
GLOBULIN SER-MCNC: 2.6 GM/DL (ref 2.4–3.5)
GLUCOSE SERPL-MCNC: 126 MG/DL (ref 82–115)
HCT VFR BLD AUTO: 34.6 % (ref 37–47)
HGB BLD-MCNC: 10.6 GM/DL (ref 12–16)
IMM GRANULOCYTES # BLD AUTO: 0.02 X10(3)/MCL (ref 0–0.04)
IMM GRANULOCYTES NFR BLD AUTO: 0.3 %
LYMPHOCYTES # BLD AUTO: 1.72 X10(3)/MCL (ref 0.6–4.6)
LYMPHOCYTES NFR BLD AUTO: 23.5 %
MAGNESIUM SERPL-MCNC: 2.3 MG/DL (ref 1.6–2.6)
MCH RBC QN AUTO: 30.7 PG (ref 27–31)
MCHC RBC AUTO-ENTMCNC: 30.6 MG/DL (ref 33–36)
MCV RBC AUTO: 100.3 FL (ref 80–94)
MONOCYTES # BLD AUTO: 0.79 X10(3)/MCL (ref 0.1–1.3)
MONOCYTES NFR BLD AUTO: 10.8 %
NEUTROPHILS # BLD AUTO: 4.4 X10(3)/MCL (ref 2.1–9.2)
NEUTROPHILS NFR BLD AUTO: 59.7 %
PHOSPHATE SERPL-MCNC: 3.7 MG/DL (ref 2.3–4.7)
PLATELET # BLD AUTO: 141 X10(3)/MCL (ref 130–400)
PMV BLD AUTO: 10.4 FL (ref 7.4–10.4)
POCT GLUCOSE: 105 MG/DL (ref 70–110)
POCT GLUCOSE: 120 MG/DL (ref 70–110)
POTASSIUM SERPL-SCNC: 4.4 MMOL/L (ref 3.5–5.1)
PROT SERPL-MCNC: 5.8 GM/DL (ref 5.8–7.6)
RBC # BLD AUTO: 3.45 X10(6)/MCL (ref 4.2–5.4)
SARS-COV-2 RDRP RESP QL NAA+PROBE: NEGATIVE
SODIUM SERPL-SCNC: 140 MMOL/L (ref 136–145)
WBC # SPEC AUTO: 7.3 X10(3)/MCL (ref 4.5–11.5)

## 2022-12-14 PROCEDURE — 83735 ASSAY OF MAGNESIUM: CPT | Performed by: FAMILY MEDICINE

## 2022-12-14 PROCEDURE — 63600175 PHARM REV CODE 636 W HCPCS: Performed by: FAMILY MEDICINE

## 2022-12-14 PROCEDURE — 80053 COMPREHEN METABOLIC PANEL: CPT | Performed by: FAMILY MEDICINE

## 2022-12-14 PROCEDURE — 25000003 PHARM REV CODE 250: Performed by: FAMILY MEDICINE

## 2022-12-14 PROCEDURE — 94761 N-INVAS EAR/PLS OXIMETRY MLT: CPT

## 2022-12-14 PROCEDURE — G0378 HOSPITAL OBSERVATION PER HR: HCPCS

## 2022-12-14 PROCEDURE — G0257 UNSCHED DIALYSIS ESRD PT HOS: HCPCS

## 2022-12-14 PROCEDURE — 84100 ASSAY OF PHOSPHORUS: CPT | Performed by: FAMILY MEDICINE

## 2022-12-14 PROCEDURE — 25000242 PHARM REV CODE 250 ALT 637 W/ HCPCS: Performed by: FAMILY MEDICINE

## 2022-12-14 PROCEDURE — 94640 AIRWAY INHALATION TREATMENT: CPT | Mod: XB

## 2022-12-14 PROCEDURE — 36415 COLL VENOUS BLD VENIPUNCTURE: CPT | Performed by: FAMILY MEDICINE

## 2022-12-14 PROCEDURE — 80100014 HC HEMODIALYSIS 1:1

## 2022-12-14 PROCEDURE — 85025 COMPLETE CBC W/AUTO DIFF WBC: CPT | Performed by: FAMILY MEDICINE

## 2022-12-14 PROCEDURE — 96366 THER/PROPH/DIAG IV INF ADDON: CPT

## 2022-12-14 PROCEDURE — 87635 SARS-COV-2 COVID-19 AMP PRB: CPT | Performed by: FAMILY MEDICINE

## 2022-12-14 PROCEDURE — 99900035 HC TECH TIME PER 15 MIN (STAT)

## 2022-12-14 RX ORDER — METOPROLOL SUCCINATE 50 MG/1
50 TABLET, EXTENDED RELEASE ORAL 2 TIMES DAILY
Qty: 60 TABLET | Refills: 11 | Status: ON HOLD | OUTPATIENT
Start: 2022-12-14 | End: 2023-08-21

## 2022-12-14 RX ORDER — CIPROFLOXACIN 250 MG/1
250 TABLET, FILM COATED ORAL EVERY 24 HOURS
Status: DISCONTINUED | OUTPATIENT
Start: 2022-12-15 | End: 2022-12-14 | Stop reason: HOSPADM

## 2022-12-14 RX ADMIN — FLUOXETINE 40 MG: 10 CAPSULE ORAL at 12:12

## 2022-12-14 RX ADMIN — DILTIAZEM HYDROCHLORIDE 30 MG: 30 TABLET, FILM COATED ORAL at 01:12

## 2022-12-14 RX ADMIN — LEVOTHYROXINE SODIUM 200 MCG: 0.1 TABLET ORAL at 12:12

## 2022-12-14 RX ADMIN — DILTIAZEM HYDROCHLORIDE 30 MG: 30 TABLET, FILM COATED ORAL at 06:12

## 2022-12-14 RX ADMIN — IPRATROPIUM BROMIDE AND ALBUTEROL SULFATE 3 ML: .5; 3 SOLUTION RESPIRATORY (INHALATION) at 01:12

## 2022-12-14 RX ADMIN — IPRATROPIUM BROMIDE AND ALBUTEROL SULFATE 3 ML: .5; 3 SOLUTION RESPIRATORY (INHALATION) at 07:12

## 2022-12-14 RX ADMIN — PANTOPRAZOLE SODIUM 40 MG: 40 TABLET, DELAYED RELEASE ORAL at 12:12

## 2022-12-14 RX ADMIN — BUDESONIDE INHALATION 0.5 MG: 0.5 SUSPENSION RESPIRATORY (INHALATION) at 07:12

## 2022-12-14 RX ADMIN — APIXABAN 2.5 MG: 2.5 TABLET, FILM COATED ORAL at 12:12

## 2022-12-14 RX ADMIN — CEFTRIAXONE SODIUM 1 G: 1 INJECTION, POWDER, FOR SOLUTION INTRAMUSCULAR; INTRAVENOUS at 12:12

## 2022-12-14 NOTE — PROGRESS NOTES
[facility]                     [address] [id number]    PATIENT NAME:       AVELINO ARCHIBALD  YOB: 1945  CSN:                [id number]   MRN:                [id number]  ADMIT DATE:         12/12/2022 15:14:00  PHYSICIAN:          Ludin Velasco MD                            PROGRESS NOTE    DATE:      SUBJECTIVE:  Admitted yesterday with volume overload and AFib with rapid   ventricular rate.  She was short of breath.  After hospitalization, we did get a   urinalysis, which showed evidence of possible UTI and she also has   cellulitis/edema and erythema of her bilateral lower extremities.  I spoke with   Dr. Mckinnon this morning and they will dialyze her today.    PHYSICAL EXAMINATION:  GENERAL:  She is morbidly obese, lying in bed, in no acute distress.   VITAL SIGNS:  Her temperature maximum is 99.1.  Blood pressure is 97/63.  Pulse   ox on room air is 95%.  Pulse is between 88 and 110 now.  Accu-Chek is stable.       HEART:  Currently irregularly irregular with a controlled rate.    LUNGS:  Clear to auscultation bilaterally.    ABDOMEN:  Obese, nontender.  EXTREMITIES:  Extremities have erythema and edema of bilateral lower   extremities.    Labs, x-rays, and medications are on the chart and reviewed.  I also had a   chance to speak with Dr. Taylor, her cardiologist, who had recommended changing   her from Coreg to Toprol for better rate-lowering effect without affecting   blood pressure as much.    ASSESSMENT:    1. Atrial fibrillation with rapid ventricular rate.  2. Morbid obesity.  3. Venous insufficiency with erythema and cellulitis along with edema of   bilateral lower extremities.  4. End-stage chronic renal insufficiency, on dialysis, with volume overload.  5. Multiple other medical problems as per the Problem List.    PLAN:  Continue current care, dialyze today.  Change the patient from  Coreg to   Toprol.  Get a BNP.  She is on antibiotics for her possible UTI and cellulitis.        ______________________________  MD CHRISTINE Cardenas/LEIGHTON  DD:  12/13/2022  Time:  08:10PM  DT:  12/13/2022  Time:  10:18PM  Job #:  [id number]      PROGRESS NOTE

## 2022-12-14 NOTE — PLAN OF CARE
Pt d/c back to NH today-[facility].  D/C info sent except COVID testing.  COVID testing ordered informed nurse to swab pt.  Nurse stated that pt can ride in their van.  Messaged Christine at [facility] of d/c and also Jihan Willson.

## 2022-12-14 NOTE — PLAN OF CARE
Per Jihan at [facility], the nurse who has the pt there is Marylu and she will call our nurse for report and they are sending their van to pick her up.  Gilbert Roche.

## 2022-12-14 NOTE — PLAN OF CARE
Problem: Adult Inpatient Plan of Care  Goal: Plan of Care Review  Outcome: Ongoing, Progressing  Goal: Patient-Specific Goal (Individualized)  Outcome: Ongoing, Progressing  Goal: Absence of Hospital-Acquired Illness or Injury  Outcome: Ongoing, Progressing  Goal: Optimal Comfort and Wellbeing  Outcome: Ongoing, Progressing  Goal: Readiness for Transition of Care  Outcome: Ongoing, Progressing     Problem: Bariatric Environmental Safety  Goal: Safety Maintained with Care  Outcome: Ongoing, Progressing     Problem: Device-Related Complication Risk (Hemodialysis)  Goal: Safe, Effective Therapy Delivery  Outcome: Ongoing, Progressing     Problem: Hemodynamic Instability (Hemodialysis)  Goal: Effective Tissue Perfusion  Outcome: Ongoing, Progressing     Problem: Infection (Hemodialysis)  Goal: Absence of Infection Signs and Symptoms  Outcome: Ongoing, Progressing

## 2022-12-14 NOTE — NURSING
Report given to nurse at [facility] for discharge. Patient to be transported back via van. All patient belongings packed

## 2022-12-17 ENCOUNTER — PATIENT OUTREACH (OUTPATIENT)
Dept: ADMINISTRATIVE | Facility: HOSPITAL | Age: 77
End: 2022-12-17
Payer: MEDICAID

## 2023-01-02 NOTE — DISCHARGE SUMMARY
[facility]                     [address] [id number]    PATIENT NAME:       AVELINO ARCHIBALD  YOB: 1945  CSN:                [id number]   MRN:                [id number]  ADMIT DATE:         12/12/2022 15:14:00  PHYSICIAN:          Ludin Velasco MD                          DISCHARGE SUMMARY    DATE OF DISCHARGE:  12/14/2022 16:46:00    HISTORY AND HOSPITAL COURSE:  She has been seen in my office with bilateral   lower extremity edema, increased shortness of breath, and underlying AFib with   rapid ventricular rate.  She is a dialysis patient, and she apparently drinks   too much fluid as per Dr. Mckinnon, her nephrologist.  Dr. Mckinnon was   consulted and patient was dialyzed during hospitalization.  Her medications were   adjusted.  Dr. Taylor is her cardiologist, and he wanted her on Toprol instead   of Coreg secondary to developing low blood pressure with Coreg.  Her breathing   improved during hospitalization, as well as her edema with diuresis and   dialysis.  She was placed on telemetry during hospitalization.  Hospital course   otherwise was not complicated.  We did restrict her fluid.  She did receive   antibiotics for a possible UTI and cellulitis of bilateral lower extremities.    She was discharged on the 14th back to the nursing home in stable condition.    Her shortness of breath was significantly better and she had no tachycardia.    ASSESSMENT:    1. Atrial fibrillation with rapid ventricular rate.  Medications adjusted.   2. Morbid obesity.   3. Venous insufficiency with erythema and cellulitis along with edema of   bilateral lower extremities.  4. End-stage chronic renal insufficiency, currently on dialysis with volume   overload.   5. Possible urinary tract infection.  Culture pending.  6. Underlying diabetes type 2.  Sliding scale used during hospitalization.   7. Hypertension.   8.  Shortness of breath secondary to volume overload.   9. Underlying cardiomyopathy.    PLAN:    1. Discharge patient back to the nursing home.    2. Continue Tylenol 500 mg every 6 hours as needed.    3. Continue DuoNeb and budesonide neb treatments.    4. Continue atorvastatin 40 mg one p.o. daily, diltiazem one every 6 hours 30 mg   p.o., Eliquis 2.5 mg b.i.d., Prozac 40 mg one p.o. daily, Flonase two sprays   both nostrils once a day, gabapentin 100 mg twice a day, Humulin 70/30, 15 units   twice a day prior to breakfast and supper routine sliding scale, lactobacillus   500,000,000 units one cap daily, levothyroxine 200 mcg one p.o. daily,   loratadine 10 mg one p.o. daily, melatonin 3 mg take two at night for sleep,   metolazone 5 mg daily, metoprolol succinate one twice a day 50 mg, multivitamin   one p.o. daily, Protonix 40 mg one p.o. daily, Prevident 5000+ as prior to   hospitalization, Requip 25 mg every evening and every morning, Demadex 20 mg one   p.o. daily, Sonata 10 mg q.h.s. for sleep.    5. Discontinue Coreg.    6. Continue cardiac, renal, diabetic diet and increase activity as tolerated   with fall precautions.    7. Continue outpatient dialysis and follow up with me in 1 week.        ______________________________  MD CHRISTINE Cardenas/LEIGHTON  DD:  01/02/2023  Time:  02:31PM  DT:  01/02/2023  Time:  02:44PM  Job #:  [id number]      DISCHARGE SUMMARY

## 2023-05-01 ENCOUNTER — HOSPITAL ENCOUNTER (EMERGENCY)
Facility: HOSPITAL | Age: 78
Discharge: HOME | End: 2023-05-01
Attending: INTERNAL MEDICINE
Payer: MEDICARE

## 2023-05-01 VITALS
TEMPERATURE: 98 F | OXYGEN SATURATION: 97 % | RESPIRATION RATE: 20 BRPM | SYSTOLIC BLOOD PRESSURE: 125 MMHG | BODY MASS INDEX: 50.11 KG/M2 | WEIGHT: 293 LBS | HEART RATE: 93 BPM | DIASTOLIC BLOOD PRESSURE: 61 MMHG

## 2023-05-01 DIAGNOSIS — S40.012A CONTUSION OF LEFT SHOULDER, INITIAL ENCOUNTER: Primary | ICD-10-CM
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
## 2023-05-01 DIAGNOSIS — S00.81XA ABRASION OF FACE, INITIAL ENCOUNTER: ICD-10-CM

## 2023-05-01 DIAGNOSIS — S90.414A ABRASION OF TOE OF RIGHT FOOT, INITIAL ENCOUNTER: ICD-10-CM

## 2023-05-01 LAB
ALBUMIN SERPL-MCNC: 3.8 G/DL (ref 3.4–4.8)
ALBUMIN/GLOB SERPL: 1.3 RATIO (ref 1.1–2)
ALP SERPL-CCNC: 178 UNIT/L (ref 40–150)
ALT SERPL-CCNC: 19 UNIT/L (ref 0–55)
APTT PPP: 37.5 SECONDS (ref 23.2–33.7)
AST SERPL-CCNC: 17 UNIT/L (ref 5–34)
BASOPHILS # BLD AUTO: 0.1 X10(3)/MCL (ref 0–0.2)
BASOPHILS NFR BLD AUTO: 1.2 %
BILIRUBIN DIRECT+TOT PNL SERPL-MCNC: 0.4 MG/DL
BUN SERPL-MCNC: 52 MG/DL (ref 9.8–20.1)
CALCIUM SERPL-MCNC: 9.7 MG/DL (ref 8.4–10.2)
CHLORIDE SERPL-SCNC: 98 MMOL/L (ref 98–107)
CO2 SERPL-SCNC: 28 MMOL/L (ref 23–31)
CREAT SERPL-MCNC: 5.6 MG/DL (ref 0.55–1.02)
EOSINOPHIL # BLD AUTO: 0.39 X10(3)/MCL (ref 0–0.9)
EOSINOPHIL NFR BLD AUTO: 4.8 %
ERYTHROCYTE [DISTWIDTH] IN BLOOD BY AUTOMATED COUNT: 14.9 % (ref 11.5–17)
GFR SERPLBLD CREATININE-BSD FMLA CKD-EPI: 7 MLS/MIN/1.73/M2
GLOBULIN SER-MCNC: 2.9 GM/DL (ref 2.4–3.5)
GLUCOSE SERPL-MCNC: 96 MG/DL (ref 82–115)
HCT VFR BLD AUTO: 38.2 % (ref 37–47)
HGB BLD-MCNC: 11.7 G/DL (ref 12–16)
IMM GRANULOCYTES # BLD AUTO: 0.03 X10(3)/MCL (ref 0–0.04)
IMM GRANULOCYTES NFR BLD AUTO: 0.4 %
INR BLD: 1.26 (ref 0–1.3)
LYMPHOCYTES # BLD AUTO: 1.69 X10(3)/MCL (ref 0.6–4.6)
LYMPHOCYTES NFR BLD AUTO: 20.7 %
MCH RBC QN AUTO: 30.6 PG (ref 27–31)
MCHC RBC AUTO-ENTMCNC: 30.6 G/DL (ref 33–36)
MCV RBC AUTO: 100 FL (ref 80–94)
MONOCYTES # BLD AUTO: 0.69 X10(3)/MCL (ref 0.1–1.3)
MONOCYTES NFR BLD AUTO: 8.4 %
NEUTROPHILS # BLD AUTO: 5.27 X10(3)/MCL (ref 2.1–9.2)
NEUTROPHILS NFR BLD AUTO: 64.5 %
PLATELET # BLD AUTO: 185 X10(3)/MCL (ref 130–400)
PMV BLD AUTO: 11 FL (ref 7.4–10.4)
POTASSIUM SERPL-SCNC: 4.9 MMOL/L (ref 3.5–5.1)
PROT SERPL-MCNC: 6.7 GM/DL (ref 5.8–7.6)
PROTHROMBIN TIME: 16.2 SECONDS (ref 12.5–14.5)
RBC # BLD AUTO: 3.82 X10(6)/MCL (ref 4.2–5.4)
SODIUM SERPL-SCNC: 143 MMOL/L (ref 136–145)
WBC # SPEC AUTO: 8.2 X10(3)/MCL (ref 4.5–11.5)

## 2023-05-01 PROCEDURE — 99285 EMERGENCY DEPT VISIT HI MDM: CPT | Mod: 25

## 2023-05-01 PROCEDURE — 85610 PROTHROMBIN TIME: CPT | Performed by: INTERNAL MEDICINE

## 2023-05-01 PROCEDURE — 85025 COMPLETE CBC W/AUTO DIFF WBC: CPT | Performed by: INTERNAL MEDICINE

## 2023-05-01 PROCEDURE — 80053 COMPREHEN METABOLIC PANEL: CPT | Performed by: INTERNAL MEDICINE

## 2023-05-01 PROCEDURE — 85730 THROMBOPLASTIN TIME PARTIAL: CPT | Performed by: INTERNAL MEDICINE

## 2023-05-01 NOTE — DISCHARGE INSTRUCTIONS
Patient must be seen by patient's primary care M.D. for follow-up and further treatment as needed.     Inform patients Primary Care physician about the ER visit and need for follow up.    Get medicines and orders from the emergency room approved by patient's primary care M.D.    The exam and treatment you received in Emergency Room was for an urgent problem and NOT INTENDED AS COMPLETE CARE. It is important that you FOLLOW UP with a doctor for ongoing care. If your symptoms become WORSE or you DO NOT IMPROVE and you are unable to reach your health care provider, you should RETURN to the emergency department. The Emergency Room doctor has provided a PRELIMINARY INTERPRETATION of all your STUDIES. A final interpretation may be done after you are discharged. IF A CHANGE in your diagnosis or treatment is needed WE WILL CONTACT YOU. It is critical that we have a CURRENT PHONE NUMBER FOR YOU.

## 2023-05-01 NOTE — ED PROVIDER NOTES
Encounter Date: 5/1/2023  History from patient     History     Chief Complaint   Patient presents with    Fall     From [facility] with reports of getting weak this am and falling. C/o pain to L shoulder, abrasions to scalp, and toes on R foot. Denies LOC. +blood thinner     HPI    78 y.o. female  has a past medical history of Anxiety disorder, unspecified, Chronic kidney disease, unspecified, Congestive heart failure (CHF), Depression, Diabetes mellitus, GERD (gastroesophageal reflux disease), History of anemia due to chronic kidney disease, Hypercholesterolemia, Hypertension, Hypothyroidism, unspecified, Parkinsonism, and Paroxysmal atrial fibrillation. Presenting with  Fall (From [facility] with reports of getting weak this am and falling. C/o pain to L shoulder, abrasions to scalp, and toes on R foot. Denies LOC. +blood thinner)      Review of patient's allergies indicates:   Allergen Reactions    Codeine Nausea And Vomiting    Cortisone Nausea Only and Nausea And Vomiting     only allergic to oral  only allergic to oral      Lisinopril Nausea And Vomiting and Rash    Morphine Itching     Current Outpatient Medications   Medication Instructions    acetaminophen (TYLENOL) 500 mg, Oral, Every 6 hours PRN    albuterol-ipratropium (DUO-NEB) 2.5 mg-0.5 mg/3 mL nebulizer solution 3 mLs, Nebulization, Every 6 hours while awake, Rescue    atorvastatin (LIPITOR) 40 mg, Oral, Daily    budesonide (PULMICORT) 0.5 mg, Nebulization, Every 12 hours, Controller    diltiaZEM (CARDIZEM) 30 mg, Oral, Every 6 hours    ELIQUIS 2.5 mg, Oral, 2 times daily    FLUoxetine 40 mg, Oral, Daily    fluticasone propionate (FLONASE) 100 mcg, Each Nostril, Daily    gabapentin (NEURONTIN) 100 mg, Oral, 2 times daily    HUMULIN 70/30 U-100 INSULIN 100 unit/mL (70-30) injection 15 Units, Subcutaneous, 2 times daily    insulin regular 4-10 Units, Subcutaneous, 2 times daily before meals, 200-250: 4 units 251-300: 6 units 301-350: 8 units 351-400: 10  units 401 > notify MD    Lactobacillus acidophilus 500 million cell Cap 1 capsule, Oral, Daily    levothyroxine (SYNTHROID) 200 mcg, Oral, Daily    loratadine (CLARITIN) 10 mg, Oral    melatonin (MELATIN) 6 mg, Oral, Daily    metOLazone (ZAROXOLYN) 5 mg, Oral, Daily    metoprolol succinate (TOPROL-XL) 50 mg, Oral, 2 times daily    multivitamin with folic acid 400 mcg Tab 1 tablet, Oral, Daily    pantoprazole (PROTONIX) 40 mg, Oral, Daily    PREVIDENT 5000 PLUS 1.1 % Crea No dose, route, or frequency recorded.    rOPINIRole (REQUIP) 0.5 mg, Oral, Nightly    rOPINIRole (REQUIP) 1 mg, Oral, Daily    torsemide (DEMADEX) 20 mg, Oral, Daily    zaleplon (SONATA) 10 mg, Oral, Nightly PRN       Past Medical History:   Diagnosis Date    Anxiety disorder, unspecified     Chronic kidney disease, unspecified     Congestive heart failure (CHF)     Depression     Diabetes mellitus     GERD (gastroesophageal reflux disease)     History of anemia due to chronic kidney disease     Hypercholesterolemia     Hypertension     Hypothyroidism, unspecified     Parkinsonism     Paroxysmal atrial fibrillation      Past Surgical History:   Procedure Laterality Date    CHOLECYSTECTOMY      Dialysis shunt Left     HYSTERECTOMY       Family History   Problem Relation Age of Onset    Hypertension Mother     Heart disease Mother     Hypertension Father     Heart disease Father     Heart disease Sister     Hypertension Sister      Social History     Tobacco Use    Smoking status: Never     Passive exposure: Past    Smokeless tobacco: Never   Substance Use Topics    Alcohol use: Not Currently    Drug use: Never     Review of Systems   Constitutional:  Negative for fatigue and fever.   HENT:  Negative for trouble swallowing and voice change.    Eyes:  Negative for visual disturbance.   Respiratory:  Positive for shortness of breath. Negative for cough.    Cardiovascular:  Negative for chest pain.   Gastrointestinal:  Negative for abdominal pain,  diarrhea and vomiting.   Genitourinary:  Negative for dysuria and hematuria.   Musculoskeletal:  Negative for gait problem.        Left shoulder pain, right foot pain,   Skin:  Negative for color change and rash.   Neurological:  Positive for weakness. Negative for headaches.   Psychiatric/Behavioral:  Negative for behavioral problems and sleep disturbance.    All other systems reviewed and are negative.    Physical Exam     Initial Vitals [05/01/23 0406]   BP Pulse Resp Temp SpO2   114/84 95 16 97.9 °F (36.6 °C) 95 %      MAP       --         Physical Exam    Nursing note and vitals reviewed.  Constitutional: She appears well-developed and well-nourished. No distress.   Obese patient   HENT:   Head: Atraumatic.       Abrasion noted to the frontal area, also abrasion noted to the left preauricular area,   Eyes: EOM are normal.   Neck: Neck supple.   Cardiovascular:  Normal rate and regular rhythm.           Pulmonary/Chest: Breath sounds normal. No respiratory distress. She has no wheezes. She has no rhonchi. She has no rales.   Abdominal: Abdomen is soft. Bowel sounds are normal.   Musculoskeletal:      Left shoulder: Tenderness present. Decreased range of motion.        Arms:       Cervical back: Neck supple.      Right foot: Tenderness present. No deformity or bony tenderness.        Legs:      Neurological: She is alert and oriented to person, place, and time.   Skin: Skin is warm and dry.   Psychiatric: She has a normal mood and affect.       ED Course   Procedures  Labs Reviewed   COMPREHENSIVE METABOLIC PANEL - Abnormal; Notable for the following components:       Result Value    Blood Urea Nitrogen 52.0 (*)     Creatinine 5.60 (*)     Alkaline Phosphatase 178 (*)     All other components within normal limits   APTT - Abnormal; Notable for the following components:    PTT 37.5 (*)     All other components within normal limits   PROTIME-INR - Abnormal; Notable for the following components:    PT 16.2 (*)      All other components within normal limits   CBC WITH DIFFERENTIAL - Abnormal; Notable for the following components:    RBC 3.82 (*)     Hgb 11.7 (*)     .0 (*)     MCHC 30.6 (*)     MPV 11.0 (*)     All other components within normal limits   CBC W/ AUTO DIFFERENTIAL    Narrative:     The following orders were created for panel order CBC auto differential.  Procedure                               Abnormality         Status                     ---------                               -----------         ------                     CBC with Differential[[id number]]        Abnormal            Final result                 Please view results for these tests on the individual orders.          Imaging Results              CT Cervical Spine Without Contrast (Preliminary result)  Result time 05/01/23 04:49:27      Preliminary result by Cayetano Georges MD (05/01/23 04:49:27)                   Narrative:    START OF REPORT:  Technique: CT of the cervical spine was performed without intravenous contrast with axial as well as sagittal and coronal images.    Comparison: None.    Dosage Information: Automated Exposure Control was utilized 420.15 mGy.cm.    Clinical history: Fall; neck trauma.    Findings:  Lung apices: The visualized lung apices appear unremarkable.  Spine:  Spinal canal: The spinal canal appears unremarkable.  Spinal cord: The spinal cord appears unremarkable.  Mineralization: Within normal limits for age.  Scoliosis: No significant scoliosis is seen.  Vertebral Fusion: No vertebral fusion is identified.  Listhesis: No significant listhesis is identified.  Intervertebral disc spaces: Multilevel loss of disc height is seen.  Osteophytes: Multilevel endplate osteophytes are seen.  Endplate Sclerosis: Multilevel endplate sclerosis is seen.  Uncovertebral degenerative changes: Multilevel uncovertebral joint arthrosis is seen.  Facet degenerative changes: Multilevel facet degenerative changes are  seen.  Fractures: No acute cervical spine fracture dislocation or subluxation is seen.    Miscellaneous:  Soft Tissues: Unremarkable.      Impression:  1. No acute cervical spine fracture dislocation or subluxation is seen.  2. Degenerative changes and other details as above.                                         CT Head Without Contrast (Preliminary result)  Result time 05/01/23 04:48:30      Preliminary result by Cayetano Georges MD (05/01/23 04:48:30)                   Narrative:    START OF REPORT:  Technique: CT of the head was performed without intravenous contrast with axial as well as coronal and sagittal images.    Comparison: None.    Dosage Information: Automated Exposure Control was utilized 879.24 mGy.cm.    Clinical history: Fall; head trauma.    Findings:  Hemorrhage: No acute intracranial hemorrhage is seen.  CSF spaces: The ventricles, sulci and basal cisterns all appear mildly prominent consistent with global cerebral atrophy.  Brain parenchyma: There is preservation of the grey white junction throughout. Mild microvascular change is seen in portions of the periventricular and deep white matter tracts.  Cerebellum: Unremarkable.  Vascular: Severe atheromatous calcification of the intracranial arteries is seen.  Sella and skull base: The sella appears to be within normal limits for age.  Herniation: None.  Intracranial calcifications: Incidental note is made of bilateral choroid plexus calcification. Incidental note is made of some pineal region calcification. Incidental note is made of some calcification of the falx. Incidental note is made of bilateral basal ganglia calcifications.  Calvarium: No acute linear or depressed skull fracture is seen.  Scalp: There is soft tissue emphysema in the right temporal and infratemporal regions (series 3, images 8-29). No associated bone injury is identified.    Maxillofacial Structures:  Paranasal sinuses: The visualized paranasal sinuses appear clear with no  mucoperiosteal thickening or air fluid levels identified.  Orbits: The orbits appear unremarkable.  Zygomatic arches: The zygomatic arches are intact and unremarkable.  Temporal bones and mastoids: The temporal bones and mastoids appear unremarkable.  TMJ: The mandibular condyles appear normally placed with respect to the mandibular fossa.      Impression:  1. There is soft tissue emphysema in the right temporal and infratemporal regions (series 3, images 8-29).  2. No acute intracranial traumatic injury identified. Details and other findings as noted above.                                         X-Ray Foot Complete Right (Preliminary result)  Result time 05/01/23 05:35:28      Wet Read by Hamilton Bautista MD (05/01/23 05:35:28, [facility] - Emergency Dept, Emergency Medicine)    X-Ray, Independently Interpreted by Hamilton Bautista MD.  Right foot three views:  No acute fractures identified                                     X-Ray Shoulder Trauma Left (Preliminary result)  Result time 05/01/23 05:35:41      Wet Read by Haimlton Bautista MD (05/01/23 05:35:41, [facility] - Emergency Dept, Emergency Medicine)    X-Ray, Independently Interpreted by Hamilton Bautista MD.  Left shoulder three views:  No acute fractures identified                                     Medications - No data to display  Medical Decision Making:   Initial Assessment:   78 y.o. female  has a past medical history of Anxiety disorder, unspecified, Chronic kidney disease, unspecified, Congestive heart failure (CHF), Depression, Diabetes mellitus, GERD (gastroesophageal reflux disease), History of anemia due to chronic kidney disease, Hypercholesterolemia, Hypertension, Hypothyroidism, unspecified, Parkinsonism, and Paroxysmal atrial fibrillation. Presenting with  Fall (From [facility] with reports of getting weak this am and falling. C/o pain to L shoulder, abrasions to scalp, and toes on R foot. Denies LOC. +blood  thinner)    Patient essentially got up to go to get the dialysis done at 6:00 a.m. in the nursing home, and she says she had weakness which she has chronic problem with and she fell hitting the head, face, right foot, and left shoulder on the ground, she is on Eliquis, she is on dialysis, on the way to the emergency room she had 1 reading of blood pressure 80 systolic, they gave her about 100 cc of IV fluids and her blood pressure is maintained now.  She says she has shortness of breath, but that is also chronic problem she says and usually gets better after dialysis.  Clinical Tests:   Lab Tests: Ordered  Radiological Study: Ordered and Reviewed           ED Course as of 05/01/23 0540   Mon May 01, 2023   0535 Patient does not have any major injuries from the fall, and she does not have any major abnormality in the blood work, she has normal bicarb and potassium, BUN creatinine is of course elevated as she was supposed to go and get the dialysis done today.  Her H&H is stable she does not have any signs of any active bleeding going on anywhere at this time, I will go ahead and discharge her with instruction to get the dialysis done and then follow up with the family doctor. [GQ]   0540 BP(!): 123/94 [GQ]   0540 Pulse: 86 [GQ]   0540 Resp: 18 [GQ]   0540 SpO2: 97 %  Patient's vital signs remained stable, she will go and get the dialysis done and I have advised them to let them know that she had a week spell this morning so they do not take out too much fluid. [GQ]      ED Course User Index  [GQ] Hamilton Bautista MD                 Clinical Impression:   Final diagnoses:  [W19.XXXA] Accidental fall, initial encounter  [S40.012A] Contusion of left shoulder, initial encounter (Primary)  [S00.81XA] Abrasion of face, initial encounter  [S90.414A] Abrasion of toe of right foot, initial encounter        ED Disposition Condition    Discharge Stable          ED Prescriptions    None       Follow-up Information       Follow  up With Specialties Details Why Contact Info    Ludin Velasco MD Family Medicine In 1 day  [address] [place] [address]  [phone]               Hamilton Bautista MD  05/01/23 0527

## 2023-05-29 ENCOUNTER — LAB REQUISITION (OUTPATIENT)
Dept: LAB | Facility: HOSPITAL | Age: 78
End: 2023-05-29
Payer: MEDICARE

## 2023-05-29 DIAGNOSIS — I48.11 LONGSTANDING PERSISTENT ATRIAL FIBRILLATION: ICD-10-CM

## 2023-05-29 LAB — DIGOXIN SERPL-MCNC: 2.2 NG/ML (ref 0.8–2)

## 2023-05-29 PROCEDURE — 80162 ASSAY OF DIGOXIN TOTAL: CPT | Performed by: FAMILY MEDICINE

## 2023-06-21 ENCOUNTER — HOSPITAL ENCOUNTER (EMERGENCY)
Facility: HOSPITAL | Age: 78
Discharge: NURSING, REHAB OR LONG-TERM CARE | End: 2023-06-21
Attending: INTERNAL MEDICINE
Payer: MEDICARE

## 2023-06-21 VITALS
TEMPERATURE: 98 F | OXYGEN SATURATION: 97 % | RESPIRATION RATE: 20 BRPM | HEIGHT: 67 IN | WEIGHT: 293 LBS | HEART RATE: 90 BPM | DIASTOLIC BLOOD PRESSURE: 61 MMHG | SYSTOLIC BLOOD PRESSURE: 116 MMHG | BODY MASS INDEX: 45.99 KG/M2

## 2023-06-21 DIAGNOSIS — L02.31 ABSCESS OF BUTTOCK, LEFT: Primary | ICD-10-CM

## 2023-06-21 LAB
ANION GAP SERPL CALC-SCNC: 8 MEQ/L
BASOPHILS # BLD AUTO: 0.07 X10(3)/MCL
BASOPHILS NFR BLD AUTO: 0.6 %
BUN SERPL-MCNC: 16 MG/DL (ref 9.8–20.1)
CALCIUM SERPL-MCNC: 9.3 MG/DL (ref 8.4–10.2)
CHLORIDE SERPL-SCNC: 106 MMOL/L (ref 98–107)
CO2 SERPL-SCNC: 25 MMOL/L (ref 23–31)
CREAT SERPL-MCNC: 2.79 MG/DL (ref 0.55–1.02)
CREAT/UREA NIT SERPL: 6
EOSINOPHIL # BLD AUTO: 0.26 X10(3)/MCL (ref 0–0.9)
EOSINOPHIL NFR BLD AUTO: 2.2 %
ERYTHROCYTE [DISTWIDTH] IN BLOOD BY AUTOMATED COUNT: 15.4 % (ref 11.5–17)
GFR SERPLBLD CREATININE-BSD FMLA CKD-EPI: 17 MLS/MIN/1.73/M2
GLUCOSE SERPL-MCNC: 108 MG/DL (ref 82–115)
HCT VFR BLD AUTO: 37.8 % (ref 37–47)
HGB BLD-MCNC: 11.4 G/DL (ref 12–16)
IMM GRANULOCYTES # BLD AUTO: 0.05 X10(3)/MCL (ref 0–0.04)
IMM GRANULOCYTES NFR BLD AUTO: 0.4 %
LACTATE SERPL-SCNC: 2.2 MMOL/L (ref 0.5–2.2)
LYMPHOCYTES # BLD AUTO: 1.08 X10(3)/MCL (ref 0.6–4.6)
LYMPHOCYTES NFR BLD AUTO: 9.3 %
MCH RBC QN AUTO: 30.8 PG (ref 27–31)
MCHC RBC AUTO-ENTMCNC: 30.2 G/DL (ref 33–36)
MCV RBC AUTO: 102.2 FL (ref 80–94)
MONOCYTES # BLD AUTO: 0.82 X10(3)/MCL (ref 0.1–1.3)
MONOCYTES NFR BLD AUTO: 7.1 %
NEUTROPHILS # BLD AUTO: 9.35 X10(3)/MCL (ref 2.1–9.2)
NEUTROPHILS NFR BLD AUTO: 80.4 %
PLATELET # BLD AUTO: 159 X10(3)/MCL (ref 130–400)
PMV BLD AUTO: 10.7 FL (ref 7.4–10.4)
POTASSIUM SERPL-SCNC: 4.2 MMOL/L (ref 3.5–5.1)
RBC # BLD AUTO: 3.7 X10(6)/MCL (ref 4.2–5.4)
SODIUM SERPL-SCNC: 139 MMOL/L (ref 136–145)
WBC # SPEC AUTO: 11.63 X10(3)/MCL (ref 4.5–11.5)

## 2023-06-21 PROCEDURE — 63600175 PHARM REV CODE 636 W HCPCS: Performed by: INTERNAL MEDICINE

## 2023-06-21 PROCEDURE — 99285 EMERGENCY DEPT VISIT HI MDM: CPT | Mod: 25

## 2023-06-21 PROCEDURE — 96365 THER/PROPH/DIAG IV INF INIT: CPT | Mod: 59

## 2023-06-21 PROCEDURE — 80048 BASIC METABOLIC PNL TOTAL CA: CPT | Performed by: INTERNAL MEDICINE

## 2023-06-21 PROCEDURE — 46050 I&D PERIANAL ABSCESS SUPFC: CPT

## 2023-06-21 PROCEDURE — 87040 BLOOD CULTURE FOR BACTERIA: CPT | Performed by: INTERNAL MEDICINE

## 2023-06-21 PROCEDURE — 83605 ASSAY OF LACTIC ACID: CPT | Performed by: INTERNAL MEDICINE

## 2023-06-21 PROCEDURE — 85025 COMPLETE CBC W/AUTO DIFF WBC: CPT | Performed by: INTERNAL MEDICINE

## 2023-06-21 PROCEDURE — 25000003 PHARM REV CODE 250: Performed by: INTERNAL MEDICINE

## 2023-06-21 RX ORDER — LIDOCAINE HYDROCHLORIDE 10 MG/ML
10 INJECTION, SOLUTION EPIDURAL; INFILTRATION; INTRACAUDAL; PERINEURAL
Status: COMPLETED | OUTPATIENT
Start: 2023-06-21 | End: 2023-06-21

## 2023-06-21 RX ADMIN — VANCOMYCIN HYDROCHLORIDE 1500 MG: 1 INJECTION, POWDER, LYOPHILIZED, FOR SOLUTION INTRAVENOUS at 03:06

## 2023-06-21 RX ADMIN — LIDOCAINE HYDROCHLORIDE 100 MG: 10 INJECTION, SOLUTION EPIDURAL; INFILTRATION; INTRACAUDAL; PERINEURAL at 12:06

## 2023-06-21 NOTE — DISCHARGE INSTRUCTIONS
Wound care in the nursing home regularly        Patient must be seen by patient's primary care M.D. for follow-up and further treatment as needed.     Inform patients Primary Care physician about the ER visit and need for follow up.    Get medicines and orders from the emergency room approved by patient's primary care M.D.    The exam and treatment you received in Emergency Room was for an urgent problem and NOT INTENDED AS COMPLETE CARE. It is important that you FOLLOW UP with a doctor for ongoing care. If your symptoms become WORSE or you DO NOT IMPROVE and you are unable to reach your health care provider, you should RETURN to the emergency department. The Emergency Room doctor has provided a PRELIMINARY INTERPRETATION of all your STUDIES. A final interpretation may be done after you are discharged. IF A CHANGE in your diagnosis or treatment is needed WE WILL CONTACT YOU. It is critical that we have a CURRENT PHONE NUMBER FOR YOU.

## 2023-06-21 NOTE — ED PROVIDER NOTES
Encounter Date: 6/21/2023  History from patient     History     Chief Complaint   Patient presents with    Abscess     Boil / abscess to left buttocks x 4 days      HPI  78 y.o. female  has a past medical history of Anxiety disorder, unspecified, Chronic kidney disease, unspecified, Congestive heart failure (CHF), Depression, Diabetes mellitus, GERD (gastroesophageal reflux disease), History of anemia due to chronic kidney disease, Hypercholesterolemia, Hypertension, Hypothyroidism, unspecified, Parkinsonism, and Paroxysmal atrial fibrillation. presenting with  Abscess (Boil / abscess to left buttocks x 4 days )      Review of patient's allergies indicates:   Allergen Reactions    Codeine Nausea And Vomiting    Cortisone Nausea Only and Nausea And Vomiting     only allergic to oral  only allergic to oral      Lisinopril Nausea And Vomiting and Rash    Morphine Itching     Past Medical History:   Diagnosis Date    Anxiety disorder, unspecified     Chronic kidney disease, unspecified     Congestive heart failure (CHF)     Depression     Diabetes mellitus     GERD (gastroesophageal reflux disease)     History of anemia due to chronic kidney disease     Hypercholesterolemia     Hypertension     Hypothyroidism, unspecified     Parkinsonism     Paroxysmal atrial fibrillation      Past Surgical History:   Procedure Laterality Date    CHOLECYSTECTOMY      Dialysis shunt Left     HYSTERECTOMY       Family History   Problem Relation Age of Onset    Hypertension Mother     Heart disease Mother     Hypertension Father     Heart disease Father     Heart disease Sister     Hypertension Sister      Social History     Tobacco Use    Smoking status: Never     Passive exposure: Past    Smokeless tobacco: Never   Substance Use Topics    Alcohol use: Not Currently    Drug use: Never     Review of Systems   Constitutional:  Positive for chills.   HENT:  Negative for trouble swallowing and voice change.    Eyes:  Negative for visual  disturbance.   Respiratory:  Negative for cough and shortness of breath.    Cardiovascular:  Negative for chest pain.   Gastrointestinal:  Negative for abdominal pain, diarrhea and vomiting.   Genitourinary:  Negative for dysuria and hematuria.   Musculoskeletal:  Negative for gait problem.        No Pain.   Skin:  Negative for color change and rash.        Left buttock ball   Neurological:  Negative for headaches.   Psychiatric/Behavioral:  Positive for confusion. Negative for behavioral problems and sleep disturbance.    All other systems reviewed and are negative.    Physical Exam     Initial Vitals [06/21/23 1106]   BP Pulse Resp Temp SpO2   130/70 97 18 98.3 °F (36.8 °C) 95 %      MAP       --         Physical Exam    Nursing note and vitals reviewed.  Constitutional: She appears well-developed and well-nourished. No distress.   Obese lady   HENT:   Head: Atraumatic.   Eyes: EOM are normal.   Neck: Neck supple.   Cardiovascular:  Normal rate and regular rhythm.           Pulmonary/Chest: Breath sounds normal. No respiratory distress. She has no wheezes. She has no rales.   Abdominal: Abdomen is soft. Bowel sounds are normal. She exhibits no distension. There is abdominal tenderness. There is no rebound and no guarding.   Musculoskeletal:         General: Normal range of motion.      Cervical back: Neck supple.     Neurological: She is alert and oriented to person, place, and time.   Skin: Skin is warm and dry.   Psychiatric: She has a normal mood and affect.       ED Course   I & D - Incision and Drainage    Date/Time: 6/21/2023 12:40 PM  Location procedure was performed: [facility] EMERGENCY DEPARTMENT  Performed by: Hamilton Bautista MD  Authorized by: Hamilton Bautista MD   Pre-operative diagnosis: Buttock Abscess  Post-operative diagnosis: Sebacsous Cyst of Buttock  Consent Done: Yes  Consent: Verbal consent obtained.  Risks and benefits: risks, benefits and alternatives were discussed  Consent given by:  "patient  Patient understanding: patient states understanding of the procedure being performed  Patient consent: the patient's understanding of the procedure matches consent given  Site marked: the operative site was marked  Patient identity confirmed: verbally with patient  Time out: Immediately prior to procedure a "time out" was called to verify the correct patient, procedure, equipment, support staff and site/side marked as required.  Type: abscess  Body area: anogenital  Location details: perianal  Anesthesia: local infiltration    Anesthesia:  Local Anesthetic: lidocaine 1% without epinephrine  Anesthetic total: 5 mL    Patient sedated: no  Scalpel size: 11  Incision type: single straight  Incision depth: subcutaneous  Complexity: simple  Drainage: pus  Drainage amount: scant  Wound treatment: wound packed and wound left open  Packing material: Betadine soaked 4X4.  Complications: No  Estimated blood loss (mL): 1  Specimens: No  Patient tolerance: Patient tolerated the procedure well with no immediate complications    Incision depth: subcutaneous      Orders Placed This Encounter   Procedures    INCISION AND DRAINAGE    Blood culture #1 **CANNOT BE ORDERED STAT**    Blood culture #2 **CANNOT BE ORDERED STAT**    CT Abdomen Pelvis  Without Contrast    CBC auto differential    Basic metabolic panel    Lactic acid, plasma    CBC with Differential    DNR (Do Not Resuscitate)    Insert Saline lock IV     Medications   LIDOcaine (PF) 10 mg/ml (1%) injection 100 mg (100 mg Intradermal Given 6/21/23 1246)   vancomycin (VANCOCIN) 1,500 mg in dextrose 5 % (D5W) 250 mL IVPB (0 mg Intravenous Stopped 6/21/23 1636)     Admission on 06/21/2023, Discharged on 06/21/2023   Component Date Value Ref Range Status    Sodium Level 06/21/2023 139  136 - 145 mmol/L Final    Potassium Level 06/21/2023 4.2  3.5 - 5.1 mmol/L Final    Chloride 06/21/2023 106  98 - 107 mmol/L Final    Carbon Dioxide 06/21/2023 25  23 - 31 mmol/L Final    " Glucose Level 06/21/2023 108  82 - 115 mg/dL Final    Blood Urea Nitrogen 06/21/2023 16.0  9.8 - 20.1 mg/dL Final    Creatinine 06/21/2023 2.79 (H)  0.55 - 1.02 mg/dL Final    BUN/Creatinine Ratio 06/21/2023 6   Final    Calcium Level Total 06/21/2023 9.3  8.4 - 10.2 mg/dL Final    Anion Gap 06/21/2023 8.0  mEq/L Final    eGFR 06/21/2023 17  mls/min/1.73/m2 Final    Lactic Acid Level 06/21/2023 2.2  0.5 - 2.2 mmol/L Final    WBC 06/21/2023 11.63 (H)  4.50 - 11.50 x10(3)/mcL Final    RBC 06/21/2023 3.70 (L)  4.20 - 5.40 x10(6)/mcL Final    Hgb 06/21/2023 11.4 (L)  12.0 - 16.0 g/dL Final    Hct 06/21/2023 37.8  37.0 - 47.0 % Final    MCV 06/21/2023 102.2 (H)  80.0 - 94.0 fL Final    MCH 06/21/2023 30.8  27.0 - 31.0 pg Final    MCHC 06/21/2023 30.2 (L)  33.0 - 36.0 g/dL Final    RDW 06/21/2023 15.4  11.5 - 17.0 % Final    Platelet 06/21/2023 159  130 - 400 x10(3)/mcL Final    MPV 06/21/2023 10.7 (H)  7.4 - 10.4 fL Final    Neut % 06/21/2023 80.4  % Final    Lymph % 06/21/2023 9.3  % Final    Mono % 06/21/2023 7.1  % Final    Eos % 06/21/2023 2.2  % Final    Basophil % 06/21/2023 0.6  % Final    Lymph # 06/21/2023 1.08  0.6 - 4.6 x10(3)/mcL Final    Neut # 06/21/2023 9.35 (H)  2.1 - 9.2 x10(3)/mcL Final    Mono # 06/21/2023 0.82  0.1 - 1.3 x10(3)/mcL Final    Eos # 06/21/2023 0.26  0 - 0.9 x10(3)/mcL Final    Baso # 06/21/2023 0.07  <=0.2 x10(3)/mcL Final    IG# 06/21/2023 0.05 (H)  0 - 0.04 x10(3)/mcL Final    IG% 06/21/2023 0.4  % Final   Lab Requisition on 06/21/2023   Component Date Value Ref Range Status    Color, UA 06/21/2023 Yellow  Yellow, Light-Yellow, Dark Yellow, Jaylin, Straw Final    Appearance, UA 06/21/2023 Slightly Cloudy (A)  Clear Final    Specific Gravity, UA 06/21/2023 1.030   Final    pH, UA 06/21/2023 5.0  5.0 - 8.5 Final    Protein, UA 06/21/2023 Negative  Negative mg/dL Final    Glucose, UA 06/21/2023 Negative  Negative, Normal mg/dL Final    Ketones, UA 06/21/2023 Negative  Negative mg/dL Final     Blood, UA 06/21/2023 Negative  Negative unit/L Final    Bilirubin, UA 06/21/2023 Negative  Negative mg/dL Final    Urobilinogen, UA 06/21/2023 0.2  0.2, 1.0, Normal mg/dL Final    Nitrites, UA 06/21/2023 Negative  Negative Final    Leukocyte Esterase, UA 06/21/2023 Negative  Negative unit/L Final       Labs Reviewed   BASIC METABOLIC PANEL - Abnormal; Notable for the following components:       Result Value    Creatinine 2.79 (*)     All other components within normal limits   CBC WITH DIFFERENTIAL - Abnormal; Notable for the following components:    WBC 11.63 (*)     RBC 3.70 (*)     Hgb 11.4 (*)     .2 (*)     MCHC 30.2 (*)     MPV 10.7 (*)     Neut # 9.35 (*)     IG# 0.05 (*)     All other components within normal limits   LACTIC ACID, PLASMA - Normal   BLOOD CULTURE OLG   BLOOD CULTURE OLG   CBC W/ AUTO DIFFERENTIAL    Narrative:     The following orders were created for panel order CBC auto differential.  Procedure                               Abnormality         Status                     ---------                               -----------         ------                     CBC with Differential[[id number]]        Abnormal            Final result                 Please view results for these tests on the individual orders.          Imaging Results              CT Abdomen Pelvis  Without Contrast (Final result)  Result time 06/21/23 12:34:02   Procedure changed from CT Abdomen Pelvis With Contrast     Final result by Rd Neil MD (06/21/23 12:34:02)                   Impression:      1. Linear soft tissue density like focus extending from the anus posteriorly to the subcutaneous tissues of the buttocks bilaterally with the left side greater than the right with mild surrounding edema/stranding suggests a infectious/inflammatory process/cellulitis.  No distinct fluid collection identified to suggest abscess formation.  2. Interim increase in size of a round soft tissue mass at the mid abdomen  mesentery with smaller adjacent masses versus lobulation.  This now measures up to 6 cm in transverse diameter.  Patient has a history of lymphoma and prior mesenteric masses.  3. Calcified eggshell peritoneal nodule again evident at the anterior abdominal wall with adjacent postsurgical changes  4. Cardiomegaly  5. A few pinpoint 2-3 mm pleural base nodule at the posterior lower lungs bilaterally with mild atelectasis and or scarring  6. Findings of mild constipation  7. Mild hepatomegaly  8. A few mildly prominent lymph nodes in the periaortic or pericaval region measuring up to a cm in diameter.      Electronically signed by: Rd Neil  Date:    06/21/2023  Time:    12:34               Narrative:    EXAMINATION:  CT ABDOMEN PELVIS WITHOUT CONTRAST    CLINICAL HISTORY:  Abdominal abscess/infection suspected;, .    TECHNIQUE:  PATIENT RADIATION DOSE: DLP(mGycm) 1287    As per PQRS measures, all CT scans at this facility used dose modulation, iterative reconstruction, and/or weight based dose adjustment when appropriate to reduce radiation dose to as low as reasonably achievable.    COMPARISON:  03/22/2021    FINDINGS:  Serial axial images were obtained of the abdomen pelvis without the administration of IV contrast.  Additional sagittal and coronal reconstructions were performed. Degenerative changes are noted to the thoracolumbar spine.  Bony structures are osteopenic.  The heart is enlarged.  There is interim calcification of a pleural base nodule at the posterior right lung base measuring up to 1.2 cm.  A few pinpoint pleural base nodules are evident at the posterior lower lungs bilaterally measuring up to 2-3 mm.  Atelectasis and/or scarring is evident the posterior lower lungs.  The liver is mildly enlarged.  The spleen, adrenal glands, and pancreas are grossly within normal limits without the administration of IV contrast.  The gallbladder is surgically absent.  There is interim increase in size of a  round soft tissue mass in the mid abdomen measuring 6 cm in transverse diameter.  There is lobulation extending inferiorly versus 2 adjacent masses measuring up to 2.5 cm in diameter each.  The kidneys are relatively symmetric in size. 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 There is again bilateral renal lobulation and renal cortical thinning. There is mild bilateral perinephric stranding. A few lymph nodes measuring up to 1 cm are seen in the periaortic or pericaval region.  Atherosclerosis is seen within the aorta.  No dilated loops of bowel are identified.  Feces is evident throughout the colon including the rectum.  The bladder is partially distended with fluid.  The uterus is absent.  No significant free fluid collection is identified.  The appendix is not identified with certainty.  Postsurgical changes are noted to the anterior abdominal wall.  There is mild scattered edema in the anterior subcutaneous fat a small eggshell calcification is again evident along the anterior abdominal wall measuring just over 1 cm; not significantly changed.  There is localized soft tissue density extending from the anal region into the subcutaneous fat with surrounding edema/stranding.  No distinct localized fluid collection is identified to suggest abscess formation.  This measures 8 cm in length and approximately 2 cm in thickness.                                       Medications   LIDOcaine (PF) 10 mg/ml (1%) injection 100 mg (100 mg Intradermal Given 6/21/23 1246)   vancomycin (VANCOCIN) 1,500 mg in dextrose 5 % (D5W) 250 mL IVPB (0 mg Intravenous Stopped 6/21/23 1636)                 ED Course as of 06/21/23 1743   Wed Jun 21, 2023   1440 Patient essentially has an abscess in the buttock which I have drained, she does have some stranding on the CT scan, patient is on dialysis I will go ahead and give her a dose of vancomycin which should hold in the system till her next dialysis and that should help out with this  abscess she has.  She does not have any major collection at this time more like cellulitis in the area.  I think vancomycin should be able to take care of that, I will let her go back to the nursing home with instruction to follow-up with the family doctor. [GQ]   1446 I have given the results to the patient, patient says she is already taking an antibiotic by mouth in the nursing home, I advised her to let them continue the antibiotic, I have incise the abscess, CT scan does not show any major collection of pus at this time, and then vancomycin needs to be given again after the dialysis.  Patient verbalizes understanding and she says that she will let the dialysis people no because she knows that she has to get the dose of vancomycin after dialysis.  Patient is educated enough and I think she should be able to take it from there.  I will discharge her back to the nursing home after the dose of vancomycin.  I have also advised her to let her family doctor no and she says they will do it. [GQ]   1742 Patient is discharged home and she will get the vancomycin IV after every dialysis at the dialysis center. [GQ]      ED Course User Index  [GQ] Hamilton Bautista MD                 Clinical Impression:   Final diagnoses:  [L02.31] Abscess of buttock, left (Primary)        ED Disposition Condition    Discharge Stable          ED Prescriptions    None       Follow-up Information       Follow up With Specialties Details Why Contact Info    Ludin Velasco MD Family Medicine   [address] [place] [address]  [phone]               Hamilton Bautista MD  06/21/23 6099

## 2023-06-26 LAB
BACTERIA BLD CULT: NORMAL
BACTERIA BLD CULT: NORMAL

## 2023-07-10 DIAGNOSIS — K60.3 ANAL FISTULA: Primary | ICD-10-CM

## 2023-07-24 ENCOUNTER — HOSPITAL ENCOUNTER (INPATIENT)
Facility: HOSPITAL | Age: 78
LOS: 9 days | Discharge: HOME | DRG: 193 | End: 2023-08-02
Attending: EMERGENCY MEDICINE | Admitting: FAMILY MEDICINE
Payer: MEDICARE

## 2023-07-24 DIAGNOSIS — N18.6 ESRD (END STAGE RENAL DISEASE): ICD-10-CM

## 2023-07-24 DIAGNOSIS — Z99.2 CHRONIC KIDNEY DISEASE REQUIRING CHRONIC DIALYSIS: ICD-10-CM

## 2023-07-24 DIAGNOSIS — E87.70 HYPERVOLEMIA, UNSPECIFIED HYPERVOLEMIA TYPE: ICD-10-CM

## 2023-07-24 DIAGNOSIS — I48.91 ATRIAL FIBRILLATION WITH RAPID VENTRICULAR RESPONSE: Primary | ICD-10-CM

## 2023-07-24 DIAGNOSIS — J18.9 PNEUMONIA OF RIGHT UPPER LOBE DUE TO INFECTIOUS ORGANISM: ICD-10-CM

## 2023-07-24 DIAGNOSIS — N18.6 CHRONIC KIDNEY DISEASE REQUIRING CHRONIC DIALYSIS: ICD-10-CM

## 2023-07-24 DIAGNOSIS — R07.9 CHEST PAIN: ICD-10-CM

## 2023-07-24 DIAGNOSIS — R06.02 SOB (SHORTNESS OF BREATH): ICD-10-CM

## 2023-07-24 LAB
ALBUMIN SERPL-MCNC: 3.5 G/DL (ref 3.4–4.8)
ALBUMIN/GLOB SERPL: 1.2 RATIO (ref 1.1–2)
ALP SERPL-CCNC: 173 UNIT/L (ref 40–150)
ALT SERPL-CCNC: 16 UNIT/L (ref 0–55)
AST SERPL-CCNC: 16 UNIT/L (ref 5–34)
BASOPHILS # BLD AUTO: 0.11 X10(3)/MCL
BASOPHILS NFR BLD AUTO: 1.3 %
BILIRUBIN DIRECT+TOT PNL SERPL-MCNC: 0.7 MG/DL
BNP BLD-MCNC: 1132.4 PG/ML
BUN SERPL-MCNC: 14 MG/DL (ref 9.8–20.1)
CALCIUM SERPL-MCNC: 8.7 MG/DL (ref 8.4–10.2)
CHLORIDE SERPL-SCNC: 103 MMOL/L (ref 98–107)
CO2 SERPL-SCNC: 31 MMOL/L (ref 23–31)
CREAT SERPL-MCNC: 2.54 MG/DL (ref 0.55–1.02)
EOSINOPHIL # BLD AUTO: 0.05 X10(3)/MCL (ref 0–0.9)
EOSINOPHIL NFR BLD AUTO: 0.6 %
ERYTHROCYTE [DISTWIDTH] IN BLOOD BY AUTOMATED COUNT: 16.4 % (ref 11.5–17)
GFR SERPLBLD CREATININE-BSD FMLA CKD-EPI: 19 MLS/MIN/1.73/M2
GLOBULIN SER-MCNC: 3 GM/DL (ref 2.4–3.5)
GLUCOSE SERPL-MCNC: 131 MG/DL (ref 82–115)
HCT VFR BLD AUTO: 37.7 % (ref 37–47)
HGB BLD-MCNC: 11.2 G/DL (ref 12–16)
IMM GRANULOCYTES # BLD AUTO: 0.03 X10(3)/MCL (ref 0–0.04)
IMM GRANULOCYTES NFR BLD AUTO: 0.4 %
LACTATE SERPL-SCNC: 1.5 MMOL/L (ref 0.5–2.2)
LIPASE SERPL-CCNC: 12 U/L
LYMPHOCYTES # BLD AUTO: 0.72 X10(3)/MCL (ref 0.6–4.6)
LYMPHOCYTES NFR BLD AUTO: 8.5 %
MCH RBC QN AUTO: 30.4 PG (ref 27–31)
MCHC RBC AUTO-ENTMCNC: 29.7 G/DL (ref 33–36)
MCV RBC AUTO: 102.4 FL (ref 80–94)
MONOCYTES # BLD AUTO: 0.8 X10(3)/MCL (ref 0.1–1.3)
MONOCYTES NFR BLD AUTO: 9.5 %
NEUTROPHILS # BLD AUTO: 6.75 X10(3)/MCL (ref 2.1–9.2)
NEUTROPHILS NFR BLD AUTO: 79.7 %
PLATELET # BLD AUTO: 145 X10(3)/MCL (ref 130–400)
PMV BLD AUTO: 10.5 FL (ref 7.4–10.4)
POCT GLUCOSE: 125 MG/DL (ref 70–110)
POTASSIUM SERPL-SCNC: 4.2 MMOL/L (ref 3.5–5.1)
PROT SERPL-MCNC: 6.5 GM/DL (ref 5.8–7.6)
RBC # BLD AUTO: 3.68 X10(6)/MCL (ref 4.2–5.4)
SODIUM SERPL-SCNC: 143 MMOL/L (ref 136–145)
TROPONIN I SERPL-MCNC: 0.02 NG/ML (ref 0–0.04)
WBC # SPEC AUTO: 8.46 X10(3)/MCL (ref 4.5–11.5)

## 2023-07-24 PROCEDURE — 80053 COMPREHEN METABOLIC PANEL: CPT | Performed by: EMERGENCY MEDICINE

## 2023-07-24 PROCEDURE — 83605 ASSAY OF LACTIC ACID: CPT | Performed by: EMERGENCY MEDICINE

## 2023-07-24 PROCEDURE — 25000242 PHARM REV CODE 250 ALT 637 W/ HCPCS: Performed by: EMERGENCY MEDICINE

## 2023-07-24 PROCEDURE — 94761 N-INVAS EAR/PLS OXIMETRY MLT: CPT

## 2023-07-24 PROCEDURE — 85025 COMPLETE CBC W/AUTO DIFF WBC: CPT | Performed by: EMERGENCY MEDICINE

## 2023-07-24 PROCEDURE — 93005 ELECTROCARDIOGRAM TRACING: CPT

## 2023-07-24 PROCEDURE — 25000242 PHARM REV CODE 250 ALT 637 W/ HCPCS: Performed by: FAMILY MEDICINE

## 2023-07-24 PROCEDURE — 99285 EMERGENCY DEPT VISIT HI MDM: CPT | Mod: 25

## 2023-07-24 PROCEDURE — 27000221 HC OXYGEN, UP TO 24 HOURS

## 2023-07-24 PROCEDURE — 63600175 PHARM REV CODE 636 W HCPCS: Performed by: EMERGENCY MEDICINE

## 2023-07-24 PROCEDURE — 94760 N-INVAS EAR/PLS OXIMETRY 1: CPT

## 2023-07-24 PROCEDURE — 63600175 PHARM REV CODE 636 W HCPCS: Performed by: FAMILY MEDICINE

## 2023-07-24 PROCEDURE — 87040 BLOOD CULTURE FOR BACTERIA: CPT | Performed by: EMERGENCY MEDICINE

## 2023-07-24 PROCEDURE — 94640 AIRWAY INHALATION TREATMENT: CPT

## 2023-07-24 PROCEDURE — 93010 EKG 12-LEAD: ICD-10-PCS | Mod: ,,, | Performed by: STUDENT IN AN ORGANIZED HEALTH CARE EDUCATION/TRAINING PROGRAM

## 2023-07-24 PROCEDURE — 25000003 PHARM REV CODE 250: Performed by: EMERGENCY MEDICINE

## 2023-07-24 PROCEDURE — 83690 ASSAY OF LIPASE: CPT | Performed by: EMERGENCY MEDICINE

## 2023-07-24 PROCEDURE — 84484 ASSAY OF TROPONIN QUANT: CPT | Performed by: EMERGENCY MEDICINE

## 2023-07-24 PROCEDURE — 21400001 HC TELEMETRY ROOM

## 2023-07-24 PROCEDURE — 25000003 PHARM REV CODE 250: Performed by: FAMILY MEDICINE

## 2023-07-24 PROCEDURE — 96374 THER/PROPH/DIAG INJ IV PUSH: CPT

## 2023-07-24 PROCEDURE — 11000001 HC ACUTE MED/SURG PRIVATE ROOM

## 2023-07-24 PROCEDURE — 93010 ELECTROCARDIOGRAM REPORT: CPT | Mod: ,,, | Performed by: STUDENT IN AN ORGANIZED HEALTH CARE EDUCATION/TRAINING PROGRAM

## 2023-07-24 PROCEDURE — 83880 ASSAY OF NATRIURETIC PEPTIDE: CPT | Performed by: EMERGENCY MEDICINE

## 2023-07-24 RX ORDER — INSULIN ASPART 100 [IU]/ML
1-10 INJECTION, SOLUTION INTRAVENOUS; SUBCUTANEOUS
Status: DISCONTINUED | OUTPATIENT
Start: 2023-07-24 | End: 2023-08-02 | Stop reason: HOSPADM

## 2023-07-24 RX ORDER — ZOLPIDEM TARTRATE 5 MG/1
5 TABLET ORAL NIGHTLY
Status: DISCONTINUED | OUTPATIENT
Start: 2023-07-24 | End: 2023-08-02 | Stop reason: HOSPADM

## 2023-07-24 RX ORDER — FUROSEMIDE 10 MG/ML
40 INJECTION INTRAMUSCULAR; INTRAVENOUS
Status: COMPLETED | OUTPATIENT
Start: 2023-07-24 | End: 2023-07-24

## 2023-07-24 RX ORDER — IBUPROFEN 200 MG
24 TABLET ORAL
Status: DISCONTINUED | OUTPATIENT
Start: 2023-07-24 | End: 2023-08-02 | Stop reason: HOSPADM

## 2023-07-24 RX ORDER — IPRATROPIUM BROMIDE AND ALBUTEROL SULFATE 2.5; .5 MG/3ML; MG/3ML
3 SOLUTION RESPIRATORY (INHALATION)
Status: COMPLETED | OUTPATIENT
Start: 2023-07-24 | End: 2023-07-24

## 2023-07-24 RX ORDER — IPRATROPIUM BROMIDE AND ALBUTEROL SULFATE 2.5; .5 MG/3ML; MG/3ML
3 SOLUTION RESPIRATORY (INHALATION)
Status: DISCONTINUED | OUTPATIENT
Start: 2023-07-24 | End: 2023-08-02 | Stop reason: HOSPADM

## 2023-07-24 RX ORDER — DILTIAZEM HYDROCHLORIDE 30 MG/1
30 TABLET, FILM COATED ORAL EVERY 6 HOURS
Status: DISCONTINUED | OUTPATIENT
Start: 2023-07-24 | End: 2023-07-29

## 2023-07-24 RX ORDER — IBUPROFEN 200 MG
16 TABLET ORAL
Status: DISCONTINUED | OUTPATIENT
Start: 2023-07-24 | End: 2023-08-02 | Stop reason: HOSPADM

## 2023-07-24 RX ORDER — ENOXAPARIN SODIUM 100 MG/ML
30 INJECTION SUBCUTANEOUS EVERY 24 HOURS
Status: DISCONTINUED | OUTPATIENT
Start: 2023-07-24 | End: 2023-07-25

## 2023-07-24 RX ORDER — GLUCAGON 1 MG
1 KIT INJECTION
Status: DISCONTINUED | OUTPATIENT
Start: 2023-07-24 | End: 2023-08-02 | Stop reason: HOSPADM

## 2023-07-24 RX ORDER — PANTOPRAZOLE SODIUM 40 MG/1
40 TABLET, DELAYED RELEASE ORAL DAILY
Status: DISCONTINUED | OUTPATIENT
Start: 2023-07-25 | End: 2023-08-02 | Stop reason: HOSPADM

## 2023-07-24 RX ORDER — DILTIAZEM HYDROCHLORIDE 5 MG/ML
15 INJECTION INTRAVENOUS
Status: COMPLETED | OUTPATIENT
Start: 2023-07-24 | End: 2023-07-24

## 2023-07-24 RX ORDER — ATORVASTATIN CALCIUM 40 MG/1
40 TABLET, FILM COATED ORAL DAILY
Status: DISCONTINUED | OUTPATIENT
Start: 2023-07-25 | End: 2023-08-02 | Stop reason: HOSPADM

## 2023-07-24 RX ORDER — METOPROLOL SUCCINATE 50 MG/1
50 TABLET, EXTENDED RELEASE ORAL 2 TIMES DAILY
Status: DISCONTINUED | OUTPATIENT
Start: 2023-07-24 | End: 2023-08-02 | Stop reason: HOSPADM

## 2023-07-24 RX ORDER — FLUOXETINE 10 MG/1
40 CAPSULE ORAL DAILY
Status: DISCONTINUED | OUTPATIENT
Start: 2023-07-25 | End: 2023-08-02 | Stop reason: HOSPADM

## 2023-07-24 RX ORDER — ACETAMINOPHEN 500 MG
500 TABLET ORAL EVERY 6 HOURS PRN
Status: DISCONTINUED | OUTPATIENT
Start: 2023-07-24 | End: 2023-08-02 | Stop reason: HOSPADM

## 2023-07-24 RX ORDER — NALOXONE HCL 0.4 MG/ML
0.02 VIAL (ML) INJECTION
Status: DISCONTINUED | OUTPATIENT
Start: 2023-07-24 | End: 2023-08-02 | Stop reason: HOSPADM

## 2023-07-24 RX ORDER — LEVOTHYROXINE SODIUM 100 UG/1
200 TABLET ORAL DAILY
Status: DISCONTINUED | OUTPATIENT
Start: 2023-07-25 | End: 2023-08-02 | Stop reason: HOSPADM

## 2023-07-24 RX ORDER — BUDESONIDE 0.5 MG/2ML
0.5 INHALANT ORAL 2 TIMES DAILY
Status: DISCONTINUED | OUTPATIENT
Start: 2023-07-24 | End: 2023-08-02 | Stop reason: HOSPADM

## 2023-07-24 RX ORDER — METRONIDAZOLE 500 MG/1
500 TABLET ORAL 3 TIMES DAILY
Status: ON HOLD | COMMUNITY
End: 2023-08-02 | Stop reason: HOSPADM

## 2023-07-24 RX ORDER — SODIUM CHLORIDE 0.9 % (FLUSH) 0.9 %
10 SYRINGE (ML) INJECTION EVERY 12 HOURS PRN
Status: DISCONTINUED | OUTPATIENT
Start: 2023-07-24 | End: 2023-08-02 | Stop reason: HOSPADM

## 2023-07-24 RX ORDER — ROPINIROLE 0.25 MG/1
0.5 TABLET, FILM COATED ORAL NIGHTLY
Status: DISCONTINUED | OUTPATIENT
Start: 2023-07-24 | End: 2023-08-01

## 2023-07-24 RX ORDER — BUDESONIDE 0.5 MG/2ML
0.5 INHALANT ORAL EVERY 12 HOURS
Status: DISCONTINUED | OUTPATIENT
Start: 2023-07-24 | End: 2023-07-24

## 2023-07-24 RX ORDER — IPRATROPIUM BROMIDE AND ALBUTEROL SULFATE 2.5; .5 MG/3ML; MG/3ML
3 SOLUTION RESPIRATORY (INHALATION) EVERY 4 HOURS PRN
Status: DISCONTINUED | OUTPATIENT
Start: 2023-07-24 | End: 2023-08-02 | Stop reason: HOSPADM

## 2023-07-24 RX ORDER — TRAMADOL HYDROCHLORIDE 50 MG/1
50 TABLET ORAL 3 TIMES DAILY
COMMUNITY

## 2023-07-24 RX ORDER — GABAPENTIN 100 MG/1
100 CAPSULE ORAL 2 TIMES DAILY
Status: DISCONTINUED | OUTPATIENT
Start: 2023-07-24 | End: 2023-08-02 | Stop reason: HOSPADM

## 2023-07-24 RX ADMIN — CEFTRIAXONE SODIUM 1 G: 1 INJECTION, POWDER, FOR SOLUTION INTRAMUSCULAR; INTRAVENOUS at 04:07

## 2023-07-24 RX ADMIN — APIXABAN 2.5 MG: 2.5 TABLET, FILM COATED ORAL at 08:07

## 2023-07-24 RX ADMIN — ZOLPIDEM TARTRATE 5 MG: 5 TABLET ORAL at 08:07

## 2023-07-24 RX ADMIN — IPRATROPIUM BROMIDE AND ALBUTEROL SULFATE 3 ML: .5; 3 SOLUTION RESPIRATORY (INHALATION) at 01:07

## 2023-07-24 RX ADMIN — DOXYCYCLINE 100 MG: 100 INJECTION, POWDER, LYOPHILIZED, FOR SOLUTION INTRAVENOUS at 05:07

## 2023-07-24 RX ADMIN — BUDESONIDE INHALATION 0.5 MG: 0.5 SUSPENSION RESPIRATORY (INHALATION) at 08:07

## 2023-07-24 RX ADMIN — DILTIAZEM HYDROCHLORIDE 30 MG: 30 TABLET, FILM COATED ORAL at 07:07

## 2023-07-24 RX ADMIN — FUROSEMIDE 40 MG: 10 INJECTION, SOLUTION INTRAMUSCULAR; INTRAVENOUS at 04:07

## 2023-07-24 RX ADMIN — ROPINIROLE HYDROCHLORIDE 0.5 MG: 0.25 TABLET, FILM COATED ORAL at 08:07

## 2023-07-24 RX ADMIN — IPRATROPIUM BROMIDE AND ALBUTEROL SULFATE 3 ML: 2.5; .5 SOLUTION RESPIRATORY (INHALATION) at 08:07

## 2023-07-24 RX ADMIN — GABAPENTIN 100 MG: 100 CAPSULE ORAL at 08:07

## 2023-07-24 RX ADMIN — ENOXAPARIN SODIUM 30 MG: 30 INJECTION SUBCUTANEOUS at 07:07

## 2023-07-24 RX ADMIN — DILTIAZEM HYDROCHLORIDE 15 MG: 5 INJECTION INTRAVENOUS at 12:07

## 2023-07-24 NOTE — ED PROVIDER NOTES
Encounter Date: 7/24/2023       History     Chief Complaint   Patient presents with    Shortness of Breath     SOB x 1 week with wheezing to the right.  [facility] reports HR upon arrival was 155 (AFIB RVR)  Cardizem 10mg given IV enroute     HPI  78-year-old female history of atrial fibrillation, diabetes, GERD, chronic anemia, HLD, hypertension, ESRD, COPD now brought in by ambulance for three-week history of productive cough of yellow sputum, intermittent shortness of breath which worsened this a.m. after dialysis, and palpitations.  Patient denies associated headache, neck pain, photophobia, fever, substernal chest pain, abdominal pain, nausea vomiting or diarrhea.  No increasing leg edema or calf pain.  Patient did use her inhaler earlier prior to dialysis which he said did give her some relief from her shortness of breath.  Patient also had a COVID test done at dialysis center today where she states was negative.  Review of patient's allergies indicates:   Allergen Reactions    Codeine Nausea And Vomiting    Cortisone Nausea Only and Nausea And Vomiting     only allergic to oral  only allergic to oral      Lisinopril Nausea And Vomiting and Rash    Morphine Itching     Past Medical History:   Diagnosis Date    Anxiety disorder, unspecified     Chronic kidney disease, unspecified     Congestive heart failure (CHF)     Depression     Diabetes mellitus     GERD (gastroesophageal reflux disease)     History of anemia due to chronic kidney disease     Hypercholesterolemia     Hypertension     Hypothyroidism, unspecified     Parkinsonism     Paroxysmal atrial fibrillation      Past Surgical History:   Procedure Laterality Date    CHOLECYSTECTOMY      Dialysis shunt Left     HYSTERECTOMY       Family History   Problem Relation Age of Onset    Hypertension Mother     Heart disease Mother     Hypertension Father     Heart disease Father     Heart disease Sister     Hypertension Sister      Social History     Tobacco Use     Smoking status: Never     Passive exposure: Past    Smokeless tobacco: Never   Substance Use Topics    Alcohol use: Not Currently    Drug use: Never     Review of Systems   All other systems reviewed and are negative.    Physical Exam     Initial Vitals   BP Pulse Resp Temp SpO2   07/24/23 1221 07/24/23 1221 07/24/23 1221 07/24/23 1601 07/24/23 1221   (!) 109/44 (!) 150 (!) 28 98.6 °F (37 °C) 99 %      MAP       --                Physical Exam    Nursing note and vitals reviewed.  Constitutional: She appears well-developed and well-nourished.   HENT:   Head: Normocephalic and atraumatic.   Eyes: Conjunctivae and EOM are normal. Pupils are equal, round, and reactive to light.   Neck: Neck supple.   Normal range of motion.  Cardiovascular:  Normal heart sounds and intact distal pulses.           Irregularly irregular rhythm, tachycardia   Pulmonary/Chest: No respiratory distress. She has wheezes. She has no rhonchi. She has no rales. She exhibits no tenderness.   Abdominal: Abdomen is soft. Bowel sounds are normal. There is no abdominal tenderness.   Musculoskeletal:         General: Edema present. No tenderness. Normal range of motion.      Cervical back: Normal range of motion and neck supple.     Neurological: She is alert and oriented to person, place, and time. She has normal strength and normal reflexes.   Skin: Skin is warm and dry. Capillary refill takes less than 2 seconds.   Psychiatric: She has a normal mood and affect. Her behavior is normal. Judgment and thought content normal.       ED Course   Procedures  Labs Reviewed   COMPREHENSIVE METABOLIC PANEL - Abnormal; Notable for the following components:       Result Value    Glucose Level 131 (*)     Creatinine 2.54 (*)     Alkaline Phosphatase 173 (*)     All other components within normal limits   B-TYPE NATRIURETIC PEPTIDE - Abnormal; Notable for the following components:    Natriuretic Peptide 1,132.4 (*)     All other components within normal limits    CBC WITH DIFFERENTIAL - Abnormal; Notable for the following components:    RBC 3.68 (*)     Hgb 11.2 (*)     .4 (*)     MCHC 29.7 (*)     MPV 10.5 (*)     All other components within normal limits   LIPASE - Normal   TROPONIN I - Normal   LACTIC ACID, PLASMA - Normal   BLOOD CULTURE OLG   BLOOD CULTURE OLG   CBC W/ AUTO DIFFERENTIAL    Narrative:     The following orders were created for panel order CBC auto differential.  Procedure                               Abnormality         Status                     ---------                               -----------         ------                     CBC with Differential[[id number]]        Abnormal            Final result                 Please view results for these tests on the individual orders.   URINALYSIS     EKG Readings: (Independently Interpreted)   AFib with RVR rate of 121, LAFB, nonspecific T-wave changes 1 in aVL, no acute ST elevation or ST depression, left axis.   ECG Results              EKG 12-lead (Final result)  Result time 07/24/23 16:22:32      Final result by Interface, Lab In [facility] (07/24/23 16:22:32)                   Narrative:    Test Reason : R06.02,    Vent. Rate : 121 BPM     Atrial Rate : 122 BPM     P-R Int : 000 ms          QRS Dur : 118 ms      QT Int : 296 ms       P-R-T Axes : 000 -63 104 degrees     QTc Int : 420 ms    Atrial fibrillation with rapid ventricular response  Left anterior fascicular block  T wave abnormality, consider lateral ischemia  Abnormal ECG  Confirmed by Arpit Duffy MD ([id number]) on 7/24/2023 4:22:22 PM    Referred By: [email or web address]   SELF           Confirmed By:Arpit Duffy MD                                  Imaging Results              X-Ray Chest AP Portable (Final result)  Result time 07/24/23 13:32:56      Final result by Rd Neil MD (07/24/23 13:32:56)                   Impression:      1. Cardiomegaly  2. Mildly prominent pulmonary vasculature with increased interstitial markings suggesting  interstitial pulmonary edema  3. Infiltrate and or atelectasis right upper lobe  4. Postsurgical changes left axilla      Electronically signed by: Rd Neil  Date:    07/24/2023  Time:    13:32               Narrative:    EXAMINATION:  XR CHEST AP PORTABLE    CLINICAL HISTORY:  , Shortness of breath.    COMPARISON:  12/12/2022    FINDINGS:  An AP view or more reveals the heart to be enlarged.  The trachea is to the right of midline.  Hazy increased interstitial markings evident diffusely throughout.  There is patchy hazy opacification of the right upper lobe.  Surgical clips are seen within the left axilla.                                       Medications   cefTRIAXone (ROCEPHIN) 1 g in dextrose 5 % in water (D5W) 5 % 100 mL IVPB (MB+) (0 g Intravenous Stopped 7/24/23 1655)   doxycycline (VIBRAMYCIN) 100 mg in dextrose 5 % in water (D5W) 5 % 100 mL IVPB (MB+) (has no administration in time range)   diltiaZEM injection 15 mg (15 mg Intravenous Given 7/24/23 1244)   albuterol-ipratropium 2.5 mg-0.5 mg/3 mL nebulizer solution 3 mL (3 mLs Nebulization Given 7/24/23 1315)   furosemide injection 40 mg (40 mg Intravenous Given 7/24/23 1634)                     78-year-old female history of AFib, diabetes, GERD, chronic anemia, HLD, hypertension, ESRD, COPD and brought in for ambulance for a three-week history of productive cough with yellow sputum and intermittent shortness of breath which worsened this AM after dialysis.  Patient with no active chest pain at this time.  On arrival patient was tachycardic with EKG showing AFib with RVR with no acute ischemic changes.  Patient was given Cardizem 15 mg with improvement of her heart rate and is now currently ranging from 100-112.  Labs remarkable for a creatinine 2.54, BNP 11 32.  Negative troponin.  Chest x-ray with cardiomegaly and interstitial infiltrates consistent with CHF and right upper lobe infiltrate.  After blood cultures patient was given both Rocephin 1 g IV  and doxycycline 100 mg IV.  Patient also received DuoNeb hand-held nebulizer 1 with good relief of her wheezing that was noted on initial presentation.  Discussed case with PCP Dr. Velasco who agrees to admit with Nephrology to consult.  Did discuss case with Dr. Mckinnon nephrology who agrees to consult on patient during patient's hospitalization will arrange inpatient dialysis.         Clinical Impression:   Final diagnoses:  [R06.02] SOB (shortness of breath)  [I48.91] Atrial fibrillation with rapid ventricular response (Primary)  [J18.9] Pneumonia of right upper lobe due to infectious organism  [N18.6] ESRD (end stage renal disease)        ED Disposition Condition    Admit Stable                Teofilo Kang MD  07/24/23 3795

## 2023-07-24 NOTE — Clinical Note
Diagnosis: SOB (shortness of breath) [004026]   Admitting Provider:: ALEJANDRA ARMENTA [[id number]]   Future Attending Provider: ALEJANDRA ARMENTA [[id number]]   Reason for IP Medical Treatment  (Clinical interventions that can only be accomplished in the IP setting? ) :: chf, pneumonia   I certify that Inpatient services for greater than or equal to 2 midnights are medically necessary:: Yes   Plans for Post-Acute care--if anticipated (pick the single best option):: A. No post acute care anticipated at this time

## 2023-07-24 NOTE — PLAN OF CARE
Problem: Adult Inpatient Plan of Care  Goal: Plan of Care Review  Outcome: Ongoing, Progressing  Goal: Patient-Specific Goal (Individualized)  Outcome: Ongoing, Progressing  Goal: Absence of Hospital-Acquired Illness or Injury  Outcome: Ongoing, Progressing  Goal: Optimal Comfort and Wellbeing  Outcome: Ongoing, Progressing  Goal: Readiness for Transition of Care  Outcome: Ongoing, Progressing     Problem: Bariatric Environmental Safety  Goal: Safety Maintained with Care  Outcome: Ongoing, Progressing     Problem: Skin Injury Risk Increased  Goal: Skin Health and Integrity  Outcome: Ongoing, Progressing     Problem: Adjustment to Illness (Chronic Kidney Disease)  Goal: Optimal Coping with Chronic Illness  Outcome: Ongoing, Progressing     Problem: Electrolyte Imbalance (Chronic Kidney Disease)  Goal: Electrolyte Balance  Outcome: Ongoing, Progressing     Problem: Fluid Volume Excess (Chronic Kidney Disease)  Goal: Fluid Balance  Outcome: Ongoing, Progressing     Problem: Functional Decline (Chronic Kidney Disease)  Goal: Optimal Functional Ability  Outcome: Ongoing, Progressing     Problem: Hematologic Alteration (Chronic Kidney Disease)  Goal: Absence of Anemia Signs and Symptoms  Outcome: Ongoing, Progressing     Problem: Oral Intake Inadequate (Chronic Kidney Disease)  Goal: Optimal Oral Intake  Outcome: Ongoing, Progressing     Problem: Pain (Chronic Kidney Disease)  Goal: Acceptable Pain Control  Outcome: Ongoing, Progressing     Problem: Renal Function Impairment (Chronic Kidney Disease)  Goal: Minimize Renal Failure Effects  Outcome: Ongoing, Progressing

## 2023-07-24 NOTE — PLAN OF CARE
Problem: Adult Inpatient Plan of Care  Goal: Plan of Care Review  Outcome: Ongoing, Progressing  Goal: Patient-Specific Goal (Individualized)  Outcome: Ongoing, Progressing  Goal: Absence of Hospital-Acquired Illness or Injury  Outcome: Ongoing, Progressing  Goal: Optimal Comfort and Wellbeing  Outcome: Ongoing, Progressing  Goal: Readiness for Transition of Care  Outcome: Ongoing, Progressing     Problem: Bariatric Environmental Safety  Goal: Safety Maintained with Care  Outcome: Ongoing, Progressing     Problem: Skin Injury Risk Increased  Goal: Skin Health and Integrity  Outcome: Ongoing, Progressing     Problem: Adjustment to Illness (Chronic Kidney Disease)  Goal: Optimal Coping with Chronic Illness  Outcome: Ongoing, Progressing     Problem: Electrolyte Imbalance (Chronic Kidney Disease)  Goal: Electrolyte Balance  Outcome: Ongoing, Progressing     Problem: Fluid Volume Excess (Chronic Kidney Disease)  Goal: Fluid Balance  Outcome: Ongoing, Progressing     Problem: Functional Decline (Chronic Kidney Disease)  Goal: Optimal Functional Ability  Outcome: Ongoing, Progressing     Problem: Hematologic Alteration (Chronic Kidney Disease)  Goal: Absence of Anemia Signs and Symptoms  Outcome: Ongoing, Progressing     Problem: Pain (Chronic Kidney Disease)  Goal: Acceptable Pain Control  Outcome: Ongoing, Progressing     Problem: Oral Intake Inadequate (Chronic Kidney Disease)  Goal: Optimal Oral Intake  Outcome: Ongoing, Progressing

## 2023-07-25 LAB
APPEARANCE UR: ABNORMAL
BACTERIA #/AREA URNS AUTO: ABNORMAL /HPF
BILIRUB UR QL STRIP.AUTO: ABNORMAL
CAOX CRY URNS QL MICRO: ABNORMAL /HPF
COLOR UR: ABNORMAL
GLUCOSE UR QL STRIP.AUTO: NEGATIVE
KETONES UR QL STRIP.AUTO: ABNORMAL
LEUKOCYTE ESTERASE UR QL STRIP.AUTO: ABNORMAL
NITRITE UR QL STRIP.AUTO: NEGATIVE
PH UR STRIP.AUTO: 5 [PH]
POCT GLUCOSE: 142 MG/DL (ref 70–110)
POCT GLUCOSE: 148 MG/DL (ref 70–110)
POCT GLUCOSE: 97 MG/DL (ref 70–110)
PROT UR QL STRIP.AUTO: ABNORMAL
RBC #/AREA URNS AUTO: ABNORMAL /HPF
RBC UR QL AUTO: ABNORMAL
SP GR UR STRIP.AUTO: 1.02
SQUAMOUS #/AREA URNS AUTO: ABNORMAL /HPF
UROBILINOGEN UR STRIP-ACNC: 0.2
WBC #/AREA URNS AUTO: ABNORMAL /HPF
YEAST URNS QL MICRO: ABNORMAL /HPF

## 2023-07-25 PROCEDURE — 25000242 PHARM REV CODE 250 ALT 637 W/ HCPCS: Performed by: FAMILY MEDICINE

## 2023-07-25 PROCEDURE — 21400001 HC TELEMETRY ROOM

## 2023-07-25 PROCEDURE — 81001 URINALYSIS AUTO W/SCOPE: CPT | Performed by: EMERGENCY MEDICINE

## 2023-07-25 PROCEDURE — 99900035 HC TECH TIME PER 15 MIN (STAT)

## 2023-07-25 PROCEDURE — 94761 N-INVAS EAR/PLS OXIMETRY MLT: CPT

## 2023-07-25 PROCEDURE — 27000221 HC OXYGEN, UP TO 24 HOURS

## 2023-07-25 PROCEDURE — 25000003 PHARM REV CODE 250: Performed by: FAMILY MEDICINE

## 2023-07-25 PROCEDURE — 90935 HEMODIALYSIS ONE EVALUATION: CPT

## 2023-07-25 PROCEDURE — 63600175 PHARM REV CODE 636 W HCPCS: Performed by: FAMILY MEDICINE

## 2023-07-25 PROCEDURE — 94640 AIRWAY INHALATION TREATMENT: CPT

## 2023-07-25 RX ORDER — CEFTRIAXONE 1 G/1
1 INJECTION, POWDER, FOR SOLUTION INTRAMUSCULAR; INTRAVENOUS
Status: DISCONTINUED | OUTPATIENT
Start: 2023-07-25 | End: 2023-07-25

## 2023-07-25 RX ORDER — FLUCONAZOLE 100 MG/1
100 TABLET ORAL DAILY
Status: DISCONTINUED | OUTPATIENT
Start: 2023-07-26 | End: 2023-08-02 | Stop reason: HOSPADM

## 2023-07-25 RX ADMIN — METOPROLOL SUCCINATE 50 MG: 50 TABLET, EXTENDED RELEASE ORAL at 10:07

## 2023-07-25 RX ADMIN — INSULIN ASPART 2 UNITS: 100 INJECTION, SOLUTION INTRAVENOUS; SUBCUTANEOUS at 12:07

## 2023-07-25 RX ADMIN — ROPINIROLE HYDROCHLORIDE 0.5 MG: 0.25 TABLET, FILM COATED ORAL at 08:07

## 2023-07-25 RX ADMIN — DILTIAZEM HYDROCHLORIDE 30 MG: 30 TABLET, FILM COATED ORAL at 12:07

## 2023-07-25 RX ADMIN — DILTIAZEM HYDROCHLORIDE 30 MG: 30 TABLET, FILM COATED ORAL at 05:07

## 2023-07-25 RX ADMIN — GABAPENTIN 100 MG: 100 CAPSULE ORAL at 08:07

## 2023-07-25 RX ADMIN — LEVOTHYROXINE SODIUM 200 MCG: 100 TABLET ORAL at 10:07

## 2023-07-25 RX ADMIN — ATORVASTATIN CALCIUM 40 MG: 40 TABLET, FILM COATED ORAL at 05:07

## 2023-07-25 RX ADMIN — PANTOPRAZOLE SODIUM 40 MG: 40 TABLET, DELAYED RELEASE ORAL at 10:07

## 2023-07-25 RX ADMIN — APIXABAN 2.5 MG: 2.5 TABLET, FILM COATED ORAL at 08:07

## 2023-07-25 RX ADMIN — APIXABAN 2.5 MG: 2.5 TABLET, FILM COATED ORAL at 10:07

## 2023-07-25 RX ADMIN — GABAPENTIN 100 MG: 100 CAPSULE ORAL at 10:07

## 2023-07-25 RX ADMIN — IPRATROPIUM BROMIDE AND ALBUTEROL SULFATE 3 ML: 2.5; .5 SOLUTION RESPIRATORY (INHALATION) at 07:07

## 2023-07-25 RX ADMIN — IPRATROPIUM BROMIDE AND ALBUTEROL SULFATE 3 ML: 2.5; .5 SOLUTION RESPIRATORY (INHALATION) at 01:07

## 2023-07-25 RX ADMIN — METOPROLOL SUCCINATE 50 MG: 50 TABLET, EXTENDED RELEASE ORAL at 08:07

## 2023-07-25 RX ADMIN — FLUOXETINE 40 MG: 10 CAPSULE ORAL at 10:07

## 2023-07-25 RX ADMIN — ENOXAPARIN SODIUM 30 MG: 30 INJECTION SUBCUTANEOUS at 05:07

## 2023-07-25 RX ADMIN — ACETAMINOPHEN 500 MG: 500 TABLET, FILM COATED ORAL at 04:07

## 2023-07-25 RX ADMIN — BUDESONIDE INHALATION 0.5 MG: 0.5 SUSPENSION RESPIRATORY (INHALATION) at 07:07

## 2023-07-25 RX ADMIN — ZOLPIDEM TARTRATE 5 MG: 5 TABLET ORAL at 08:07

## 2023-07-25 RX ADMIN — CEFTRIAXONE SODIUM 1 G: 1 INJECTION, POWDER, FOR SOLUTION INTRAMUSCULAR; INTRAVENOUS at 08:07

## 2023-07-25 RX ADMIN — DILTIAZEM HYDROCHLORIDE 30 MG: 30 TABLET, FILM COATED ORAL at 11:07

## 2023-07-25 RX ADMIN — AZITHROMYCIN MONOHYDRATE 500 MG: 500 INJECTION, POWDER, LYOPHILIZED, FOR SOLUTION INTRAVENOUS at 06:07

## 2023-07-25 NOTE — PROGRESS NOTES
"Inpatient Nutrition Evaluation    Admit Date: 7/24/2023   Total duration of encounter: 1 day    Nutrition Recommendation/Prescription     Continue current diet as tolerated.   Monitor need for adding Renal restriction to diet.   Monitor intake, weight, and labs.     RD available as needed. Thank you.     Nutrition Assessment     Chart Review    Reason Seen: continuous nutrition monitoring    Malnutrition Screening Tool Results   Have you recently lost weight without trying?: No  Have you been eating poorly because of a decreased appetite?: No   MST Score: 0     Diagnosis:  [R06.02] SOB (shortness of breath)  [I48.91] Atrial fibrillation with rapid ventricular response (Primary)  [J18.9] Pneumonia of right upper lobe due to infectious organism  [N18.6] ESRD (end stage renal disease)    Relevant Medical History:   Anxiety disorder, unspecified      Chronic kidney disease, unspecified      Congestive heart failure (CHF)      Depression      Diabetes mellitus      GERD (gastroesophageal reflux disease)      History of anemia due to chronic kidney disease      Hypercholesterolemia      Hypertension      Hypothyroidism, unspecified      Parkinsonism      Paroxysmal atrial fibrillation          Nutrition-Related Medications:   Insulin; Levothyroxine; Pantoprazole EC.     Nutrition-Related Labs:  7/25: No new labs.   7/24:Crea 2.54(H); GFR 19(L); (H); Hgb 11.2(L).     Diet Order: Diet heart healthy  Oral Supplement Order: none  Appetite/Oral Intake: good/50-75% of meals  Factors Affecting Nutritional Intake: none identified  Food/Sikh/Cultural Preferences: none reported  Food Allergies: none reported    Skin Integrity: rash  Wound(s):       Comments    7/25: Pt consuming 50% of meals so far today. No recent weight loss noted/reported. Weight stable from admit. Will continue to monitor during stay.     Anthropometrics    Height: 5' 7" (170.2 cm) Height Method: Stated  Last Weight: (!) 137.5 kg (303 lb 2.1 oz) " (07/25/23 0434) Weight Method: Bed Scale  BMI (Calculated): 47.5  BMI Classification: obese grade III (BMI >/=40)     Ideal Body Weight (IBW), Female: 135 lb     % Ideal Body Weight, Female (lb): 226.67 %                             Usual Weight Provided By: unable to obtain usual weight    Wt Readings from Last 5 Encounters:   07/25/23 (!) 137.5 kg (303 lb 2.1 oz)   06/21/23 (!) 138.3 kg (305 lb)   05/01/23 (!) 145.2 kg (320 lb)   09/01/22 135.4 kg (298 lb 8 oz)   06/25/18 (!) 151 kg (332 lb 14.3 oz)     Weight Change(s) Since Admission:  Admit Weight: (!) 138.8 kg (306 lb) (07/24/23 1221)      Patient Education    Not applicable.    Monitoring & Evaluation     Dietitian will monitor food and beverage intake, energy intake, weight, weight change, electrolyte/renal panel, glucose/endocrine profile, and gastrointestinal profile.  Nutrition Risk/Follow-Up: low (follow-up in 5-7 days)  Patients assigned 'low nutrition risk' status do not qualify for a full nutritional assessment but will be monitored and re-evaluated in a 5-7 day time period. Please consult if re-evaluation needed sooner.

## 2023-07-26 LAB
ALBUMIN SERPL-MCNC: 3.2 G/DL (ref 3.4–4.8)
ALBUMIN/GLOB SERPL: 1.1 RATIO (ref 1.1–2)
ALP SERPL-CCNC: 144 UNIT/L (ref 40–150)
ALT SERPL-CCNC: 16 UNIT/L (ref 0–55)
AST SERPL-CCNC: 24 UNIT/L (ref 5–34)
BASOPHILS # BLD AUTO: 0.12 X10(3)/MCL
BASOPHILS NFR BLD AUTO: 1.5 %
BILIRUBIN DIRECT+TOT PNL SERPL-MCNC: 0.6 MG/DL
BUN SERPL-MCNC: 34 MG/DL (ref 9.8–20.1)
CALCIUM SERPL-MCNC: 8.5 MG/DL (ref 8.4–10.2)
CHLORIDE SERPL-SCNC: 98 MMOL/L (ref 98–107)
CO2 SERPL-SCNC: 27 MMOL/L (ref 23–31)
CREAT SERPL-MCNC: 4.45 MG/DL (ref 0.55–1.02)
EOSINOPHIL # BLD AUTO: 0.1 X10(3)/MCL (ref 0–0.9)
EOSINOPHIL NFR BLD AUTO: 1.2 %
ERYTHROCYTE [DISTWIDTH] IN BLOOD BY AUTOMATED COUNT: 16.3 % (ref 11.5–17)
GFR SERPLBLD CREATININE-BSD FMLA CKD-EPI: 10 MLS/MIN/1.73/M2
GLOBULIN SER-MCNC: 3 GM/DL (ref 2.4–3.5)
GLUCOSE SERPL-MCNC: 121 MG/DL (ref 82–115)
HCT VFR BLD AUTO: 36 % (ref 37–47)
HGB BLD-MCNC: 10.7 G/DL (ref 12–16)
IMM GRANULOCYTES # BLD AUTO: 0.04 X10(3)/MCL (ref 0–0.04)
IMM GRANULOCYTES NFR BLD AUTO: 0.5 %
LYMPHOCYTES # BLD AUTO: 1.47 X10(3)/MCL (ref 0.6–4.6)
LYMPHOCYTES NFR BLD AUTO: 17.9 %
MAGNESIUM SERPL-MCNC: 2.5 MG/DL (ref 1.6–2.6)
MCH RBC QN AUTO: 30.5 PG (ref 27–31)
MCHC RBC AUTO-ENTMCNC: 29.7 G/DL (ref 33–36)
MCV RBC AUTO: 102.6 FL (ref 80–94)
MONOCYTES # BLD AUTO: 0.91 X10(3)/MCL (ref 0.1–1.3)
MONOCYTES NFR BLD AUTO: 11.1 %
NEUTROPHILS # BLD AUTO: 5.55 X10(3)/MCL (ref 2.1–9.2)
NEUTROPHILS NFR BLD AUTO: 67.8 %
PHOSPHATE SERPL-MCNC: 3.3 MG/DL (ref 2.3–4.7)
PLATELET # BLD AUTO: 107 X10(3)/MCL (ref 130–400)
PMV BLD AUTO: 11.4 FL (ref 7.4–10.4)
POCT GLUCOSE: 120 MG/DL (ref 70–110)
POCT GLUCOSE: 131 MG/DL (ref 70–110)
POCT GLUCOSE: 169 MG/DL (ref 70–110)
POTASSIUM SERPL-SCNC: 4.5 MMOL/L (ref 3.5–5.1)
PROT SERPL-MCNC: 6.2 GM/DL (ref 5.8–7.6)
RBC # BLD AUTO: 3.51 X10(6)/MCL (ref 4.2–5.4)
SODIUM SERPL-SCNC: 136 MMOL/L (ref 136–145)
WBC # SPEC AUTO: 8.19 X10(3)/MCL (ref 4.5–11.5)

## 2023-07-26 PROCEDURE — 99900035 HC TECH TIME PER 15 MIN (STAT)

## 2023-07-26 PROCEDURE — 25000003 PHARM REV CODE 250: Performed by: FAMILY MEDICINE

## 2023-07-26 PROCEDURE — 25000242 PHARM REV CODE 250 ALT 637 W/ HCPCS: Performed by: FAMILY MEDICINE

## 2023-07-26 PROCEDURE — 80053 COMPREHEN METABOLIC PANEL: CPT | Performed by: FAMILY MEDICINE

## 2023-07-26 PROCEDURE — 83735 ASSAY OF MAGNESIUM: CPT | Performed by: FAMILY MEDICINE

## 2023-07-26 PROCEDURE — 27000221 HC OXYGEN, UP TO 24 HOURS

## 2023-07-26 PROCEDURE — 85025 COMPLETE CBC W/AUTO DIFF WBC: CPT | Performed by: FAMILY MEDICINE

## 2023-07-26 PROCEDURE — 21400001 HC TELEMETRY ROOM

## 2023-07-26 PROCEDURE — 94640 AIRWAY INHALATION TREATMENT: CPT

## 2023-07-26 PROCEDURE — 84100 ASSAY OF PHOSPHORUS: CPT | Performed by: FAMILY MEDICINE

## 2023-07-26 PROCEDURE — 94761 N-INVAS EAR/PLS OXIMETRY MLT: CPT

## 2023-07-26 PROCEDURE — 80100016 HC MAINTENANCE HEMODIALYSIS

## 2023-07-26 PROCEDURE — 63700000 PHARM REV CODE 250 ALT 637 W/O HCPCS: Performed by: FAMILY MEDICINE

## 2023-07-26 PROCEDURE — 63600175 PHARM REV CODE 636 W HCPCS: Performed by: FAMILY MEDICINE

## 2023-07-26 RX ORDER — DIPHENHYDRAMINE HCL 12.5MG/5ML
12.5 LIQUID (ML) ORAL ONCE
Status: COMPLETED | OUTPATIENT
Start: 2023-07-26 | End: 2023-07-26

## 2023-07-26 RX ORDER — MAG HYDROX/ALUMINUM HYD/SIMETH 200-200-20
30 SUSPENSION, ORAL (FINAL DOSE FORM) ORAL ONCE
Status: COMPLETED | OUTPATIENT
Start: 2023-07-26 | End: 2023-07-26

## 2023-07-26 RX ADMIN — CEFTRIAXONE SODIUM 1 G: 1 INJECTION, POWDER, FOR SOLUTION INTRAMUSCULAR; INTRAVENOUS at 05:07

## 2023-07-26 RX ADMIN — ALUMINUM HYDROXIDE, MAGNESIUM HYDROXIDE, AND SIMETHICONE 30 ML: 200; 200; 20 SUSPENSION ORAL at 08:07

## 2023-07-26 RX ADMIN — GABAPENTIN 100 MG: 100 CAPSULE ORAL at 09:07

## 2023-07-26 RX ADMIN — IPRATROPIUM BROMIDE AND ALBUTEROL SULFATE 3 ML: 2.5; .5 SOLUTION RESPIRATORY (INHALATION) at 07:07

## 2023-07-26 RX ADMIN — DIPHENHYDRAMINE HYDROCHLORIDE 12.5 MG: 12.5 SOLUTION ORAL at 08:07

## 2023-07-26 RX ADMIN — DILTIAZEM HYDROCHLORIDE 30 MG: 30 TABLET, FILM COATED ORAL at 01:07

## 2023-07-26 RX ADMIN — METOPROLOL SUCCINATE 50 MG: 50 TABLET, EXTENDED RELEASE ORAL at 09:07

## 2023-07-26 RX ADMIN — BUDESONIDE INHALATION 0.5 MG: 0.5 SUSPENSION RESPIRATORY (INHALATION) at 07:07

## 2023-07-26 RX ADMIN — IPRATROPIUM BROMIDE AND ALBUTEROL SULFATE 3 ML: 2.5; .5 SOLUTION RESPIRATORY (INHALATION) at 11:07

## 2023-07-26 RX ADMIN — INSULIN ASPART 1 UNITS: 100 INJECTION, SOLUTION INTRAVENOUS; SUBCUTANEOUS at 09:07

## 2023-07-26 RX ADMIN — FLUCONAZOLE 100 MG: 100 TABLET ORAL at 08:07

## 2023-07-26 RX ADMIN — DILTIAZEM HYDROCHLORIDE 30 MG: 30 TABLET, FILM COATED ORAL at 05:07

## 2023-07-26 RX ADMIN — ATORVASTATIN CALCIUM 40 MG: 40 TABLET, FILM COATED ORAL at 05:07

## 2023-07-26 RX ADMIN — IPRATROPIUM BROMIDE AND ALBUTEROL SULFATE 3 ML: 2.5; .5 SOLUTION RESPIRATORY (INHALATION) at 03:07

## 2023-07-26 RX ADMIN — METOPROLOL SUCCINATE 50 MG: 50 TABLET, EXTENDED RELEASE ORAL at 08:07

## 2023-07-26 RX ADMIN — LEVOTHYROXINE SODIUM 200 MCG: 100 TABLET ORAL at 08:07

## 2023-07-26 RX ADMIN — ROPINIROLE HYDROCHLORIDE 0.5 MG: 0.25 TABLET, FILM COATED ORAL at 09:07

## 2023-07-26 RX ADMIN — APIXABAN 2.5 MG: 2.5 TABLET, FILM COATED ORAL at 09:07

## 2023-07-26 RX ADMIN — APIXABAN 2.5 MG: 2.5 TABLET, FILM COATED ORAL at 08:07

## 2023-07-26 RX ADMIN — IPRATROPIUM BROMIDE AND ALBUTEROL SULFATE 3 ML: 2.5; .5 SOLUTION RESPIRATORY (INHALATION) at 12:07

## 2023-07-26 RX ADMIN — ACETAMINOPHEN 500 MG: 500 TABLET, FILM COATED ORAL at 08:07

## 2023-07-26 RX ADMIN — FLUOXETINE 40 MG: 10 CAPSULE ORAL at 08:07

## 2023-07-26 RX ADMIN — PANTOPRAZOLE SODIUM 40 MG: 40 TABLET, DELAYED RELEASE ORAL at 08:07

## 2023-07-26 RX ADMIN — AZITHROMYCIN MONOHYDRATE 500 MG: 500 INJECTION, POWDER, LYOPHILIZED, FOR SOLUTION INTRAVENOUS at 09:07

## 2023-07-26 RX ADMIN — ZOLPIDEM TARTRATE 5 MG: 5 TABLET ORAL at 09:07

## 2023-07-26 RX ADMIN — GABAPENTIN 100 MG: 100 CAPSULE ORAL at 08:07

## 2023-07-26 NOTE — PROGRESS NOTES
[facility]                     [address] [address]    PATIENT NAME:       AVELINO ARCHIBALD  YOB: 1945  CSN:                [id number]   MRN:                [id number]  ADMIT DATE:         07/24/2023 12:25:00  PHYSICIAN:          Ludin Velasco MD                            PROGRESS NOTE    DATE:  07/25/2023 00:00:00    SUBJECTIVE:  Admitted yesterday with pneumonia and volume overload.  She is a   dialysis patient.  She is currently receiving a breathing treatment and has   audible expiratory wheezes.  She is able to talk and communicate however.    PHYSICAL EXAMINATION:  GENERAL:  She is morbidly obese, in no acute distress.  VITAL SIGNS:  Pulse ox is satisfactory on the chart.  Pulse is ranging between   82 and 122.  She was tachycardic on admit as well as tachypneic.  She has   underlying AFib.  She is on 2 L of oxygen per nasal cannula and her pulse ox   currently is 100%.  Temperature maximum of 100.5 degrees.  HEART:  Regular rhythm with a tachycardic rate.  LUNGS:  Expiratory wheezes bilaterally with some tachypnea.  ABDOMEN:  Morbidly obese.  EXTREMITIES:  Trace lower extremity edema bilaterally and she has a candidiasis   of her feet.    ASSESSMENT:    1. Pneumonia with hypoxia and tachypnea.  2. Tachycardia with underlying atrial fibrillation.  3. Volume overload secondary to chronic renal insufficiency, on dialysis.  4. End-stage renal disease, currently requiring dialysis.  5. Diabetic.  6. Morbid obesity.  7. Elevated temperature related to her pneumonia.  Urinalysis did appear to be   possibly infected.  Culture is pending.  8. Anemia, currently stable.  9. Multiple other medical problems as per problem list including rash of her   feet consistent with candidiasis.    PLAN:    1. Give Rocephin, Zithromax, and Diflucan.  2. Recheck a.m. labs.        ______________________________  Ludin PEREZ  MD CHRISTINE Velasco/[place]  DD:  07/25/2023  Time:  08:13PM  DT:  07/25/2023  Time:  09:17PM  Job #:  [id number]      PROGRESS NOTE

## 2023-07-26 NOTE — CONSULTS
[facility]  [address] [place] [address]  Phone: [phone]    Department of Nephrology  Consult Note      PATIENT NAME: Danae Sanchez    MRN: [id number]  TODAY'S DATE: 07/25/2023  ADMIT DATE: 7/24/2023                          CONSULT REQUESTED BY: Ludin Velasco MD    SUBJECTIVE     PRINCIPAL PROBLEM: <principal problem not specified>      REASON FOR CONSULT:  Patient is a 78-year-old  female with end-stage renal disease consulted for hemodialysis.      HPI:  Patient is a 78-year-old  female with a history of end-stage renal disease, diabetes mellitus who was center to have distal acrocyanosis as well as around patient's mouth.  She was congested in complaining of shortness of breath we ultrafiltrate approximately 4 L she was still hypoxic and was advised to go to the emergency room but patient refused.  Our nurses called the nursing home with concern of patient's declining condition and hypoxemia then transferred to our facility diagnosed with pneumonia and CHF.        Review of patient's allergies indicates:   Allergen Reactions    Codeine Nausea And Vomiting    Cortisone Nausea Only and Nausea And Vomiting     only allergic to oral  only allergic to oral      Lisinopril Nausea And Vomiting and Rash    Morphine Itching       Past Medical History:   Diagnosis Date    Anxiety disorder, unspecified     Chronic kidney disease, unspecified     Congestive heart failure (CHF)     Depression     Diabetes mellitus     GERD (gastroesophageal reflux disease)     History of anemia due to chronic kidney disease     Hypercholesterolemia     Hypertension     Hypothyroidism, unspecified     Parkinsonism     Paroxysmal atrial fibrillation      Past Surgical History:   Procedure Laterality Date    CHOLECYSTECTOMY      Dialysis shunt Left     HYSTERECTOMY       Social History     Tobacco Use    Smoking status: Never     Passive exposure: Past    Smokeless tobacco:  Never   Substance Use Topics    Alcohol use: Not Currently    Drug use: Never        Review of Systems   Constitutional:  Positive for fever.   Respiratory:  Positive for cough and shortness of breath.    All other systems reviewed and are negative.    OBJECTIVE     VITAL SIGNS (Most Recent)  Temp: 98.8 °F (37.1 °C) (07/26/23 0826)  Pulse: 77 (07/26/23 0826)  Resp: (!) 22 (07/26/23 0826)  BP: 100/63 (07/26/23 0826)  SpO2: 96 % (07/26/23 0826)    VENTILATION STATUS  Resp: (!) 22 (07/26/23 0826)  SpO2: 96 % (07/26/23 0826)           I & O (Last 24H):  Intake/Output Summary (Last 24 hours) at 7/26/2023 0945  Last data filed at 7/26/2023 0800  Gross per 24 hour   Intake 1421.17 ml   Output 4551 ml   Net -3129.83 ml       WEIGHTS  Wt Readings from Last 3 Encounters:   07/26/23 0511 (!) 137.3 kg (302 lb 11.1 oz)   07/25/23 0434 (!) 137.5 kg (303 lb 2.1 oz)   07/24/23 1601 (!) 138.8 kg (306 lb)   07/24/23 1221 (!) 138.8 kg (306 lb)   06/21/23 1106 (!) 138.3 kg (305 lb)   05/01/23 0406 (!) 145.2 kg (320 lb)       Physical Exam  Constitutional:       General: She is not in acute distress.     Appearance: Normal appearance. She is not ill-appearing.   HENT:      Head: Normocephalic and atraumatic.   Eyes:      Extraocular Movements: Extraocular movements intact.      Pupils: Pupils are equal, round, and reactive to light.   Neck:      Vascular: No carotid bruit.   Cardiovascular:      Rate and Rhythm: Normal rate and regular rhythm.      Heart sounds: No murmur heard.  Pulmonary:      Breath sounds: Wheezing and rhonchi present. No rales.   Abdominal:      General: Bowel sounds are normal.      Tenderness: There is no guarding or rebound.   Musculoskeletal:      Cervical back: Normal range of motion and neck supple.      Right lower leg: Edema present.      Left lower leg: Edema present.   Skin:     Capillary Refill: Capillary refill takes less than 2 seconds.   Neurological:      General: No focal deficit present.       Mental Status: She is alert.   Psychiatric:         Mood and Affect: Mood normal.       HOME MEDICATIONS:  No current facility-administered medications on file prior to encounter.     Current Outpatient Medications on File Prior to Encounter   Medication Sig Dispense Refill    acetaminophen (TYLENOL) 500 MG tablet Take 1 tablet (500 mg total) by mouth every 6 (six) hours as needed.  0    albuterol-ipratropium (DUO-NEB) 2.5 mg-0.5 mg/3 mL nebulizer solution Take 3 mLs by nebulization every 6 (six) hours while awake. Rescue 75 mL 0    atorvastatin (LIPITOR) 40 MG tablet Take 40 mg by mouth Daily.      budesonide (PULMICORT) 0.5 mg/2 mL nebulizer solution Take 2 mLs (0.5 mg total) by nebulization every 12 (twelve) hours. Controller  0    diltiaZEM (CARDIZEM) 30 MG tablet Take 1 tablet (30 mg total) by mouth every 6 (six) hours. 120 tablet 11    ELIQUIS 2.5 mg Tab Take 2.5 mg by mouth 2 (two) times daily.      FLUoxetine 40 MG capsule Take 40 mg by mouth once daily.      fluticasone propionate (FLONASE) 50 mcg/actuation nasal spray 2 sprays (100 mcg total) by Each Nostril route once daily.  0    gabapentin (NEURONTIN) 100 MG capsule Take 1 capsule (100 mg total) by mouth 2 (two) times daily. 60 capsule 11    HUMULIN 70/30 U-100 INSULIN 100 unit/mL (70-30) injection Inject 15 Units into the skin 2 (two) times a day.      insulin regular 100 unit/mL Inj injection Inject 4-10 Units into the skin 2 (two) times daily before meals. 200-250: 4 units 251-300: 6 units 301-350: 8 units 351-400: 10 units 401 > notify MD      Lactobacillus acidophilus 500 million cell Cap Take 1 capsule by mouth Daily.      levothyroxine (SYNTHROID) 200 MCG tablet Take 200 mcg by mouth once daily.      loratadine (CLARITIN) 10 mg tablet Take 10 mg by mouth.      melatonin (MELATIN) 3 mg tablet Take 6 mg by mouth Daily.      metOLazone (ZAROXOLYN) 5 MG tablet Take 1 tablet (5 mg total) by mouth once daily. 30 tablet 11    metoprolol succinate  (TOPROL-XL) 50 MG 24 hr tablet Take 1 tablet (50 mg total) by mouth 2 (two) times daily. 60 tablet 11    metroNIDAZOLE (FLAGYL) 500 MG tablet Take 500 mg by mouth 3 (three) times daily.      multivitamin with folic acid 400 mcg Tab Take 1 tablet by mouth once daily.      pantoprazole (PROTONIX) 40 MG tablet Take 40 mg by mouth once daily.      PREVIDENT 5000 PLUS 1.1 % Crea       rOPINIRole (REQUIP) 0.5 MG tablet Take 0.5 mg by mouth every evening.      rOPINIRole (REQUIP) 0.5 MG tablet Take 1 mg by mouth Daily.      torsemide (DEMADEX) 20 MG Tab Take 20 mg by mouth once daily.      traMADoL (ULTRAM) 50 mg tablet Take 50 mg by mouth every 6 (six) hours as needed for Pain.      zaleplon (SONATA) 10 MG capsule Take 10 mg by mouth nightly as needed.         SCHEDULED MEDS:   albuterol-ipratropium  3 mL Nebulization Q6H WAKE    apixaban  2.5 mg Oral BID    atorvastatin  40 mg Oral Daily    azithromycin  500 mg Intravenous Q24H    budesonide  0.5 mg Nebulization BID    cefTRIAXone (ROCEPHIN) IVPB  1 g Intravenous Q24H    diltiaZEM  30 mg Oral Q6H    fluconazole  100 mg Oral Daily    FLUoxetine  40 mg Oral Daily    gabapentin  100 mg Oral BID    levothyroxine  200 mcg Oral Daily    metoprolol succinate  50 mg Oral BID    pantoprazole  40 mg Oral Daily    rOPINIRole  0.5 mg Oral QHS    zolpidem  5 mg Oral QHS       CONTINUOUS INFUSIONS:    PRN MEDS:acetaminophen, albuterol-ipratropium, glucagon (human recombinant), glucose, glucose, insulin aspart U-100, naloxone, sodium chloride 0.9%    LABS AND DIAGNOSTICS     CBC LAST 3 DAYS  Recent Labs   Lab 07/24/23  1300 07/26/23  0346   WBC 8.46 8.19   RBC 3.68* 3.51*   HGB 11.2* 10.7*   HCT 37.7 36.0*   .4* 102.6*   MCH 30.4 30.5   MCHC 29.7* 29.7*   RDW 16.4 16.3    107*   MPV 10.5* 11.4*       COAGULATION LAST 3 DAYS  No results for input(s): LABPT, INR, APTT in the last 168 hours.    CHEMISTRY LAST 3 DAYS  Recent Labs   Lab 07/24/23  1300 07/26/23  0346     136   K 4.2 4.5   CO2 31 27   BUN 14.0 34.0*   CREATININE 2.54* 4.45*   CALCIUM 8.7 8.5   MG  --  2.50   ALBUMIN 3.5 3.2*   ALKPHOS 173* 144   ALT 16 16   AST 16 24   BILITOT 0.7 0.6       CARDIAC PROFILE LAST 3 DAYS  Recent Labs   Lab 07/24/23  1310   BNP 1,132.4*   TROPONINI 0.016       ENDOCRINE LAST 3 DAYS  No results for input(s): TSH, PROCAL in the last 168 hours.    LAST ARTERIAL BLOOD GAS  ABG  No results for input(s): PH, PO2, PCO2, HCO3, BE in the last 168 hours.    LAST 7 DAYS MICROBIOLOGY   Microbiology Results (last 7 days)       Procedure Component Value Units Date/Time    Blood Culture [[id number]]  (Normal) Collected: 07/24/23 1300    Order Status: Completed Specimen: Blood Updated: 07/25/23 1900     CULTURE, BLOOD ([facility]) No Growth At 24 Hours    Blood Culture [[id number]]  (Normal) Collected: 07/24/23 1300    Order Status: Completed Specimen: Blood Updated: 07/25/23 1900     CULTURE, BLOOD ([facility]) No Growth At 24 Hours            MOST RECENT IMAGING  X-Ray Chest AP Portable  Narrative: EXAMINATION:  XR CHEST AP PORTABLE    CLINICAL HISTORY:  , Shortness of breath.    COMPARISON:  12/12/2022    FINDINGS:  An AP view or more reveals the heart to be enlarged.  The trachea is to the right of midline.  Hazy increased interstitial markings evident diffusely throughout.  There is patchy hazy opacification of the right upper lobe.  Surgical clips are seen within the left axilla.  Impression: 1. Cardiomegaly  2. Mildly prominent pulmonary vasculature with increased interstitial markings suggesting interstitial pulmonary edema  3. Infiltrate and or atelectasis right upper lobe  4. Postsurgical changes left axilla    Electronically signed by: Rd Neil  Date:    07/24/2023  Time:    13:32      ECHOCARDIOGRAM RESULTS (last 5)  Results for orders placed during the hospital encounter of 12/12/22    Echo Saline Bubble? No    Interpretation Summary  · Technically difficult study, No Definity contrast is used in this  study.  · Mild concentric hypertrophy and low normal systolic function.  · The estimated ejection fraction is 50%.  · Indeterminate left ventricular diastolic function.  · Mild right ventricular enlargement.  · Aortic valve sclerosis without stenosis. Peak AV velocity 1.7m/sec.  · Mild-to-moderate mitral regurgitation.  · Mild tricuspid regurgitation.      Results for orders placed during the hospital encounter of 08/27/22    Echo Saline Bubble? No    Interpretation Summary  · The left ventricle is normal in size with mild concentric hypertrophy and low normal systolic function.  · The estimated ejection fraction is 53%.  · Aortic valve sclerosis without stenosis. Peak AV velocity 1.7 m/sec.  · Mild mitral regurgitation.  · Mild tricuspid regurgitation.  · Atrial fibrillation observed.  · Moderate left atrial enlargement.  · Intermediate central venous pressure (8 mmHg).  · The estimated PA systolic pressure is 37 mmHg.      CURRENT/PREVIOUS VISIT EKG  Results for orders placed or performed during the hospital encounter of 07/24/23   EKG 12-lead    Collection Time: 07/24/23 12:39 PM    Narrative    Test Reason : R06.02,    Vent. Rate : 121 BPM     Atrial Rate : 122 BPM     P-R Int : 000 ms          QRS Dur : 118 ms      QT Int : 296 ms       P-R-T Axes : 000 -63 104 degrees     QTc Int : 420 ms    Atrial fibrillation with rapid ventricular response  Left anterior fascicular block  T wave abnormality, consider lateral ischemia  Abnormal ECG  Confirmed by Arpit Duffy MD ([id number]) on 7/24/2023 4:22:22 PM    Referred By: JAGDISH   SELF           Confirmed By:Arpit Duffy MD           ASSESSMENT/PLAN:     There are no active hospital problems to display for this patient.      ASSESSMENT & PLAN:   78-year-old  female with end-stage renal disease admitted with CHF and pneumonia      RECOMMENDATIONS:  Her usual days Monday Wednesday Friday she will run contiguous stays as result of the volume overload  euvolemic.        Avila Mckinnon MD  Department of Nephrology  Date of Service: 07/25/2023   9:45 AM

## 2023-07-26 NOTE — H&P
[facility]                     [address] [facility] [address]    PATIENT NAME:       AVELINO ARCHIBALD  YOB: 1945  CSN:                [id number]   MRN:                [id number]  ADMIT DATE:         2023 12:25:00  PHYSICIAN:          Ludin Velasco MD                        HISTORY AND PHYSICAL      HISTORY OF PRESENT ILLNESS:  She presented to the emergency room after dialysis,   and this morning feeling very weak and short of breath.  She has an underlying   pneumonia.    PAST MEDICAL HISTORY:  Significant for morbid obesity.  She has   chronic/end-stage renal insufficiency, currently on dialysis.  She has   depression, anxiety, atrial fibrillation, atherosclerosis of her heart, previous   basal cell carcinoma of her nose, hypertension.  She has diabetes type 2.  She   has a stable ejection fraction at last echocardiogram.  She has hyperlipidemia,   hypothyroidism, history of lymphoma, obstructive sleep apnea, osteoporosis,   peptic ulcer in the past, venous insufficiency with bilateral lower extremity   edema, vitamin D deficiency, cataracts, and CHF.  She has been treated for   bilateral pneumonia in the past.    PAST SURGICAL HISTORY:  Carpal tunnel release in , cholecystectomy in ,   colonoscopy , hemorrhoid surgery.  Hysterectomy, partial, secondary to   noncancerous causes.  She had a malignant mole removed in .  She had a left   arm fistula placement for dialysis and multiple fistulograms after that with   revisions.  She has history of CHF as well    FAMILY MEDICAL HISTORY:  Father had heart attacks, he is .  Mother   congestive heart failure, she is .  She also had diabetes, type 2.    Brother had colon cancer, lung neoplasm.    SOCIAL HISTORY:  No tobacco, alcohol, or drugs.  She is .    CODE STATUS:  Full.    ALLERGIES:  CODEINE WHICH CAUSES VOMITING.   LISINOPRIL WHICH CAUSES A COUGH,   MORPHINE WHICH CAUSES ITCHING, CORTISONE WHICH CAUSES VOMITING.     IMMUNIZATIONS:  Up-to-date.    MEDICATIONS:  Currently include;  1. Tylenol 325 mg 2 every 6 hours as needed for fever or mild pain.  2. She takes acidophilus as a probiotic.  3. She takes Advair 250/50 one puff twice a day.  4. Atorvastatin 40 mg one p.o. daily.  5. Biotene for dry mouth.  6. Claritin 10 mg one p.o. daily.  7. Eliquis 2.5 mg one twice a day.  8. Fluoxetine 40 mg one p.o. daily.  9. Fluticasone nasal spray 2 puffs each nostril once a day.  10. Gabapentin 100 mg 3 times a day.  11. Humulin 70/30 of 15 units in the morning and evening.  12. Hydroxyzine 50 mg one twice a day.  13. Levothyroxine 200 mcg one p.o. daily along with 25 mcg one p.o. q.a.m.  14. Magnesium oxide 200 mg one p.o. daily.  15. Melatonin 6 mg at bedtime for sleep.  16. Metolazone 5 mg one p.o. daily.  17. Metoprolol ER 25 mg one p.o. daily.  18. Midodrine 10 mg one every Monday, Wednesday, Friday.  19. PreviDent .  20. She takes Protonix 40 mg one p.o. daily.  21. Ropinirole 1 mg one p.o. daily.  22.  lotion as needed.  23. Torsemide 20 mg one every other day.  24. Tramadol 50 mg one t.i.d.  25. Wellbutrin  mg one once a day.  26. Ambien 5 mg daily at bedtime.    PHYSICAL EXAMINATION:  GENERAL:  She is currently having a breathing treatment, and she has audible   wheezing, mostly upper respiratory, however.  She is tachypneic, morbidly obese.  VITAL SIGNS:  On the chart.  HEENT:  Shows dry oral mucosa.  NECK:  Supple.  Full range of motion.  No significant adenopathy.  HEART:  Regular rate and rhythm.  LUNGS:  Clear to expiratory wheezes bilaterally.  ABDOMEN:  Obese, nontender.  EXTREMITIES:  She has a fistula in her left arm.  Bilateral lower extremities   have 1+ edema bilaterally.  :  Deferred at this time.  BREASTS:  Deferred at this time.  RECTAL:  Deferred at this time.  NEUROLOGICAL:  She is alert and oriented.   She moves all 4 extremities well.    ASSESSMENT:    1. Pneumonia.  2. Chronic renal insufficiency/end-stage renal insufficiency, on dialysis.  3. Morbid obesity.  4. History of atrial fibrillation.  5. History of congestive heart failure.  6. Depression, anxiety.  7. Multiple other medical problems as per problem list.    PLAN:  Continue her antibiotics and breathing treatments.  Place on oxygen.    Consult Nephrology for dialysis during hospitalization.        ______________________________  Ludin Velasco MD    PBS/AQS  DD:  07/25/2023  Time:  07:55PM  DT:  07/25/2023  Time:  09:22PM  Job #:  [id number]      HISTORY AND PHYSICAL

## 2023-07-27 LAB
POCT GLUCOSE: 130 MG/DL (ref 70–110)
POCT GLUCOSE: 135 MG/DL (ref 70–110)
POCT GLUCOSE: 158 MG/DL (ref 70–110)
POCT GLUCOSE: 159 MG/DL (ref 70–110)

## 2023-07-27 PROCEDURE — 94640 AIRWAY INHALATION TREATMENT: CPT

## 2023-07-27 PROCEDURE — 63700000 PHARM REV CODE 250 ALT 637 W/O HCPCS: Performed by: FAMILY MEDICINE

## 2023-07-27 PROCEDURE — 27000221 HC OXYGEN, UP TO 24 HOURS

## 2023-07-27 PROCEDURE — 99900035 HC TECH TIME PER 15 MIN (STAT)

## 2023-07-27 PROCEDURE — 25000242 PHARM REV CODE 250 ALT 637 W/ HCPCS: Performed by: FAMILY MEDICINE

## 2023-07-27 PROCEDURE — 25000003 PHARM REV CODE 250: Performed by: FAMILY MEDICINE

## 2023-07-27 PROCEDURE — 21400001 HC TELEMETRY ROOM

## 2023-07-27 PROCEDURE — 63600175 PHARM REV CODE 636 W HCPCS: Performed by: FAMILY MEDICINE

## 2023-07-27 PROCEDURE — 94761 N-INVAS EAR/PLS OXIMETRY MLT: CPT

## 2023-07-27 RX ORDER — MUPIROCIN 20 MG/G
OINTMENT TOPICAL 2 TIMES DAILY
Status: COMPLETED | OUTPATIENT
Start: 2023-07-27 | End: 2023-07-31

## 2023-07-27 RX ADMIN — IPRATROPIUM BROMIDE AND ALBUTEROL SULFATE 3 ML: 2.5; .5 SOLUTION RESPIRATORY (INHALATION) at 03:07

## 2023-07-27 RX ADMIN — METOPROLOL SUCCINATE 50 MG: 50 TABLET, EXTENDED RELEASE ORAL at 10:07

## 2023-07-27 RX ADMIN — PANTOPRAZOLE SODIUM 40 MG: 40 TABLET, DELAYED RELEASE ORAL at 10:07

## 2023-07-27 RX ADMIN — ACETAMINOPHEN 500 MG: 500 TABLET, FILM COATED ORAL at 11:07

## 2023-07-27 RX ADMIN — CEFTRIAXONE SODIUM 1 G: 1 INJECTION, POWDER, FOR SOLUTION INTRAMUSCULAR; INTRAVENOUS at 07:07

## 2023-07-27 RX ADMIN — APIXABAN 2.5 MG: 2.5 TABLET, FILM COATED ORAL at 10:07

## 2023-07-27 RX ADMIN — AZITHROMYCIN MONOHYDRATE 500 MG: 500 INJECTION, POWDER, LYOPHILIZED, FOR SOLUTION INTRAVENOUS at 10:07

## 2023-07-27 RX ADMIN — DILTIAZEM HYDROCHLORIDE 30 MG: 30 TABLET, FILM COATED ORAL at 12:07

## 2023-07-27 RX ADMIN — INSULIN ASPART 1 UNITS: 100 INJECTION, SOLUTION INTRAVENOUS; SUBCUTANEOUS at 10:07

## 2023-07-27 RX ADMIN — LEVOTHYROXINE SODIUM 200 MCG: 100 TABLET ORAL at 10:07

## 2023-07-27 RX ADMIN — ZOLPIDEM TARTRATE 5 MG: 5 TABLET ORAL at 10:07

## 2023-07-27 RX ADMIN — GABAPENTIN 100 MG: 100 CAPSULE ORAL at 10:07

## 2023-07-27 RX ADMIN — IPRATROPIUM BROMIDE AND ALBUTEROL SULFATE 3 ML: 2.5; .5 SOLUTION RESPIRATORY (INHALATION) at 11:07

## 2023-07-27 RX ADMIN — IPRATROPIUM BROMIDE AND ALBUTEROL SULFATE 3 ML: 2.5; .5 SOLUTION RESPIRATORY (INHALATION) at 07:07

## 2023-07-27 RX ADMIN — BUDESONIDE INHALATION 0.5 MG: 0.5 SUSPENSION RESPIRATORY (INHALATION) at 07:07

## 2023-07-27 RX ADMIN — FLUOXETINE 40 MG: 10 CAPSULE ORAL at 10:07

## 2023-07-27 RX ADMIN — ROPINIROLE HYDROCHLORIDE 0.5 MG: 0.25 TABLET, FILM COATED ORAL at 10:07

## 2023-07-27 RX ADMIN — MUPIROCIN 1 G: 20 OINTMENT TOPICAL at 10:07

## 2023-07-27 RX ADMIN — ATORVASTATIN CALCIUM 40 MG: 40 TABLET, FILM COATED ORAL at 05:07

## 2023-07-27 RX ADMIN — FLUCONAZOLE 100 MG: 100 TABLET ORAL at 10:07

## 2023-07-27 RX ADMIN — DILTIAZEM HYDROCHLORIDE 30 MG: 30 TABLET, FILM COATED ORAL at 05:07

## 2023-07-27 RX ADMIN — DILTIAZEM HYDROCHLORIDE 30 MG: 30 TABLET, FILM COATED ORAL at 11:07

## 2023-07-27 NOTE — PROGRESS NOTES
[facility]                     [address] [facility] [address]    PATIENT NAME:       AVELINO ARCHIBALD  YOB: 1945  CSN:                [id number]   MRN:                [id number]  ADMIT DATE:         07/24/2023 12:25:00  PHYSICIAN:          Ludin Velasco MD                            PROGRESS NOTE    DATE:  07/26/2023 00:00:00    SUBJECTIVE:  She remains in the hospital secondary to multiple medical problems.    She has volume overload and end-stage chronic renal insufficiency, on   dialysis.  She also has pneumonia.  She is on oxygen and breathing treatment.    We are dialyzing her in the hospital.  In general, she is in no acute distress.    She feels very weak; however, she is better than yesterday as far as strength   and breathing.    PHYSICAL EXAMINATION:  VITAL SIGNS:  Stable and on the chart.  Pulse oximetry is satisfactory on 2   liters of oxygen per nasal cannula.  HEART:  Distant.  Regular rate and rhythm.  LUNGS:  Expiratory wheezes bilaterally-improved from yesterday.  ABDOMEN:  Morbidly obese.  Soft, nontender.  EXTREMITIES:  No significant edema.    ASSESSMENT:    1. End-stage renal disease, currently on dialysis.  2. Underlying atrial fibrillation, currently controlled rate.  She is   anticoagulated.  3. Morbid obesity.  4. Pneumonia with hypoxia.  5. Volume overload.  6. Multiple other medical problems as per problem list.    PLAN:  Continue current care.  Recheck labs in the morning.  Continue to dialyze   as per Nephrology.        ______________________________  Ludin Velasco MD    PBS/AQS  DD:  07/26/2023  Time:  07:45PM  DT:  07/26/2023  Time:  08:54PM  Job #:  [id number]      PROGRESS NOTE

## 2023-07-28 LAB
ALBUMIN SERPL-MCNC: 3.1 G/DL (ref 3.4–4.8)
ALBUMIN/GLOB SERPL: 1 RATIO (ref 1.1–2)
ALP SERPL-CCNC: 151 UNIT/L (ref 40–150)
ALT SERPL-CCNC: 18 UNIT/L (ref 0–55)
AST SERPL-CCNC: 22 UNIT/L (ref 5–34)
BASOPHILS # BLD AUTO: 0.06 X10(3)/MCL
BASOPHILS NFR BLD AUTO: 0.7 %
BILIRUBIN DIRECT+TOT PNL SERPL-MCNC: 0.7 MG/DL
BUN SERPL-MCNC: 42 MG/DL (ref 9.8–20.1)
CALCIUM SERPL-MCNC: 8.7 MG/DL (ref 8.4–10.2)
CHLORIDE SERPL-SCNC: 100 MMOL/L (ref 98–107)
CO2 SERPL-SCNC: 26 MMOL/L (ref 23–31)
CREAT SERPL-MCNC: 5.45 MG/DL (ref 0.55–1.02)
EOSINOPHIL # BLD AUTO: 0.4 X10(3)/MCL (ref 0–0.9)
EOSINOPHIL NFR BLD AUTO: 4.4 %
ERYTHROCYTE [DISTWIDTH] IN BLOOD BY AUTOMATED COUNT: 16 % (ref 11.5–17)
GFR SERPLBLD CREATININE-BSD FMLA CKD-EPI: 8 MLS/MIN/1.73/M2
GLOBULIN SER-MCNC: 3 GM/DL (ref 2.4–3.5)
GLUCOSE SERPL-MCNC: 119 MG/DL (ref 82–115)
HCT VFR BLD AUTO: 35.9 % (ref 37–47)
HGB BLD-MCNC: 10.6 G/DL (ref 12–16)
IMM GRANULOCYTES # BLD AUTO: 0.03 X10(3)/MCL (ref 0–0.04)
IMM GRANULOCYTES NFR BLD AUTO: 0.3 %
LYMPHOCYTES # BLD AUTO: 1.55 X10(3)/MCL (ref 0.6–4.6)
LYMPHOCYTES NFR BLD AUTO: 17.1 %
MAGNESIUM SERPL-MCNC: 2.3 MG/DL (ref 1.6–2.6)
MCH RBC QN AUTO: 30.3 PG (ref 27–31)
MCHC RBC AUTO-ENTMCNC: 29.5 G/DL (ref 33–36)
MCV RBC AUTO: 102.6 FL (ref 80–94)
MONOCYTES # BLD AUTO: 1.04 X10(3)/MCL (ref 0.1–1.3)
MONOCYTES NFR BLD AUTO: 11.5 %
NEUTROPHILS # BLD AUTO: 5.96 X10(3)/MCL (ref 2.1–9.2)
NEUTROPHILS NFR BLD AUTO: 66 %
PHOSPHATE SERPL-MCNC: 4.1 MG/DL (ref 2.3–4.7)
PLATELET # BLD AUTO: 128 X10(3)/MCL (ref 130–400)
PMV BLD AUTO: 10.8 FL (ref 7.4–10.4)
POTASSIUM SERPL-SCNC: 3.9 MMOL/L (ref 3.5–5.1)
PROT SERPL-MCNC: 6.1 GM/DL (ref 5.8–7.6)
RBC # BLD AUTO: 3.5 X10(6)/MCL (ref 4.2–5.4)
SODIUM SERPL-SCNC: 138 MMOL/L (ref 136–145)
WBC # SPEC AUTO: 9.04 X10(3)/MCL (ref 4.5–11.5)

## 2023-07-28 PROCEDURE — 94799 UNLISTED PULMONARY SVC/PX: CPT

## 2023-07-28 PROCEDURE — 94640 AIRWAY INHALATION TREATMENT: CPT

## 2023-07-28 PROCEDURE — 25000242 PHARM REV CODE 250 ALT 637 W/ HCPCS: Performed by: FAMILY MEDICINE

## 2023-07-28 PROCEDURE — 63700000 PHARM REV CODE 250 ALT 637 W/O HCPCS: Performed by: FAMILY MEDICINE

## 2023-07-28 PROCEDURE — 21400001 HC TELEMETRY ROOM

## 2023-07-28 PROCEDURE — 85025 COMPLETE CBC W/AUTO DIFF WBC: CPT | Performed by: FAMILY MEDICINE

## 2023-07-28 PROCEDURE — 63600175 PHARM REV CODE 636 W HCPCS: Performed by: FAMILY MEDICINE

## 2023-07-28 PROCEDURE — 27000221 HC OXYGEN, UP TO 24 HOURS

## 2023-07-28 PROCEDURE — 80100016 HC MAINTENANCE HEMODIALYSIS

## 2023-07-28 PROCEDURE — 99900035 HC TECH TIME PER 15 MIN (STAT)

## 2023-07-28 PROCEDURE — 25000003 PHARM REV CODE 250: Performed by: FAMILY MEDICINE

## 2023-07-28 PROCEDURE — 94761 N-INVAS EAR/PLS OXIMETRY MLT: CPT

## 2023-07-28 PROCEDURE — 83735 ASSAY OF MAGNESIUM: CPT | Performed by: FAMILY MEDICINE

## 2023-07-28 PROCEDURE — 94660 CPAP INITIATION&MGMT: CPT

## 2023-07-28 PROCEDURE — 84100 ASSAY OF PHOSPHORUS: CPT | Performed by: FAMILY MEDICINE

## 2023-07-28 PROCEDURE — 80053 COMPREHEN METABOLIC PANEL: CPT | Performed by: FAMILY MEDICINE

## 2023-07-28 RX ADMIN — IPRATROPIUM BROMIDE AND ALBUTEROL SULFATE 3 ML: 2.5; .5 SOLUTION RESPIRATORY (INHALATION) at 03:07

## 2023-07-28 RX ADMIN — APIXABAN 2.5 MG: 2.5 TABLET, FILM COATED ORAL at 11:07

## 2023-07-28 RX ADMIN — DILTIAZEM HYDROCHLORIDE 30 MG: 30 TABLET, FILM COATED ORAL at 12:07

## 2023-07-28 RX ADMIN — MUPIROCIN 1 G: 20 OINTMENT TOPICAL at 10:07

## 2023-07-28 RX ADMIN — INSULIN ASPART 1 UNITS: 100 INJECTION, SOLUTION INTRAVENOUS; SUBCUTANEOUS at 10:07

## 2023-07-28 RX ADMIN — IPRATROPIUM BROMIDE AND ALBUTEROL SULFATE 3 ML: 2.5; .5 SOLUTION RESPIRATORY (INHALATION) at 11:07

## 2023-07-28 RX ADMIN — LEVOTHYROXINE SODIUM 200 MCG: 100 TABLET ORAL at 11:07

## 2023-07-28 RX ADMIN — DILTIAZEM HYDROCHLORIDE 30 MG: 30 TABLET, FILM COATED ORAL at 11:07

## 2023-07-28 RX ADMIN — IPRATROPIUM BROMIDE AND ALBUTEROL SULFATE 3 ML: 2.5; .5 SOLUTION RESPIRATORY (INHALATION) at 07:07

## 2023-07-28 RX ADMIN — BUDESONIDE INHALATION 0.5 MG: 0.5 SUSPENSION RESPIRATORY (INHALATION) at 07:07

## 2023-07-28 RX ADMIN — DILTIAZEM HYDROCHLORIDE 30 MG: 30 TABLET, FILM COATED ORAL at 05:07

## 2023-07-28 RX ADMIN — GABAPENTIN 100 MG: 100 CAPSULE ORAL at 11:07

## 2023-07-28 RX ADMIN — APIXABAN 2.5 MG: 2.5 TABLET, FILM COATED ORAL at 09:07

## 2023-07-28 RX ADMIN — ATORVASTATIN CALCIUM 40 MG: 40 TABLET, FILM COATED ORAL at 04:07

## 2023-07-28 RX ADMIN — CEFTRIAXONE SODIUM 1 G: 1 INJECTION, POWDER, FOR SOLUTION INTRAMUSCULAR; INTRAVENOUS at 05:07

## 2023-07-28 RX ADMIN — ROPINIROLE HYDROCHLORIDE 0.5 MG: 0.25 TABLET, FILM COATED ORAL at 09:07

## 2023-07-28 RX ADMIN — FLUOXETINE 40 MG: 10 CAPSULE ORAL at 11:07

## 2023-07-28 RX ADMIN — ZOLPIDEM TARTRATE 5 MG: 5 TABLET ORAL at 09:07

## 2023-07-28 RX ADMIN — AZITHROMYCIN MONOHYDRATE 500 MG: 500 INJECTION, POWDER, LYOPHILIZED, FOR SOLUTION INTRAVENOUS at 09:07

## 2023-07-28 RX ADMIN — PANTOPRAZOLE SODIUM 40 MG: 40 TABLET, DELAYED RELEASE ORAL at 11:07

## 2023-07-28 RX ADMIN — GABAPENTIN 100 MG: 100 CAPSULE ORAL at 09:07

## 2023-07-28 RX ADMIN — FLUCONAZOLE 100 MG: 100 TABLET ORAL at 11:07

## 2023-07-28 RX ADMIN — METOPROLOL SUCCINATE 50 MG: 50 TABLET, EXTENDED RELEASE ORAL at 11:07

## 2023-07-28 RX ADMIN — MUPIROCIN 1 G: 20 OINTMENT TOPICAL at 11:07

## 2023-07-28 NOTE — PHYSICIAN QUERY
PT Name: Danae Sanchez  MR #: [id number]     DOCUMENTATION CLARIFICATION     CDS/: Alma Johnson RN CDS            Contact information:[email or web address]  This form is a permanent document in the medical record.     Query Date: July 28, 2023    By submitting this query, we are merely seeking further clarification of documentation.  Please utilize your independent clinical judgment when addressing the question(s) below.    The Medical Record contains the following   Indicators Supporting Clinical Findings Location in Medical Record     X Heart Failure documented  78-year-old  female with end-stage renal disease admitted with CHF and pneumonia    She has history of CHF   Nephrology, Dr. Mckinnon, 7/25        H&P, Dr. Velasco, 7/24     X BNP 1132.4   Labs, 7/24     X EF/Echo Technically difficult study, No Definity contrast is used in this study.  Mild concentric hypertrophy and low normal systolic function.  The estimated ejection fraction is 50%.  Indeterminate left ventricular diastolic function.  Mild right ventricular enlargement.  Aortic valve sclerosis without stenosis. Peak AV velocity 1.7m/sec.  Mild-to-moderate mitral regurgitation.  Mild tricuspid regurgitation.   Echo, 12/22     X Radiology findings CXR: Cardiomegaly    Mildly prominent pulmonary vasculature with increased interstitial markings suggesting interstitial pulmonary edema   Imaging, 7/24     X Subjective/Objective Respiratory Conditions Expiratory wheezes bilaterally    She is tachypneic     H&P, Dr. Velasco, 7/24    HM, Dr Edwards, 7/25    Recent/Current MI      Heart Transplant, LVAD       X Edema, JVD Bilateral lower extremities   have 1+ edema bilaterally    Volume overload secondary to chronic renal insufficiency, on dialysis   H&P, Dr. Velasco, 7/24      HM, Dr Edwards, 7/25    Ascites       X Diuretics/Meds Atorvastatin 40 mg one p.o. daily    Torsemide 20 mg one every other day     H&P,   Krista, 7/24     X Other Treatment Continue her antibiotics and breathing treatments.  Place on oxygen.    Consult Nephrology for dialysis during hospitalization   H&P, Dr. Velasco, 7/24     X Other Chronic renal insufficiency/end-stage renal insufficiency, on dialysis    She has underlying AFib    Was at dialysis center noted  to have distal acrocyanosis as well as around patient's mouth.  She was congested in complaining of shortness of breath we ultrafiltrate approximately 4 L she was still hypoxic and was advised to go to the emergency room but patient refused.  Our nurses called the nursing home with concern of patient's declining condition and hypoxemia then transferred to our facility diagnosed with pneumonia and CHF.   HM, Dr Edwards, 7/25          Nephrology, Dr. Mckinnon, 7/25     Heart failure is a clinical diagnosis which includes symptomatic fluid retention, elevated intracardiac pressures, and/or the inability of the heart to deliver adequate blood flow.    Heart Failure with reduced Ejection Fraction (HFrEF) or Systolic Heart Failure (loses ability to contract normally, EF is <40%)    Heart Failure with preserved Ejection Fraction (HFpEF) or Diastolic Heart Failure (stiff ventricles, does not relax properly, EF is >50%)     Heart Failure with Combined Systolic and Diastolic Failure (stiff ventricles, does not relax properly and EF is <50%)    Mid-range or mildly reduced ejection fraction (HFmrEF) is classified as systolic heart failure.  Congestive heart failure with a recovered EF is classified as Diastolic Heart Failure.  Common clues to acute exacerbation:  Rapidly progressive symptoms (w/in 2 weeks of presentation), using IV diuretics, using supplemental O2, pulmonary edema on Xray, new or worsening pleural effusion, +JVD or other signs of volume overload, MI w/in 4 weeks, and/or BNP >500  The clinical guidelines noted are only system guidelines, and do not replace the providers  clinical judgment.    Provider, please specify the diagnosis associated with the above clinical findings.    [   x]  Acute on Chronic Diastolic Heart Failure (HFpEF) - worsening of CHF signs/symptoms in preexisting CHF   [   ]  Chronic Diastolic Heart Failure (HFpEF) - preexisting and stable   [   ]  Other (please specify): ___________________________________   [  ]  Clinically Undetermined         Please document in your progress notes daily for the duration of treatment until resolved and include in your discharge summary.    References:  American Heart Association editorial staff. (2017, May). Ejection Fraction Heart Failure Measurement. American Heart Association. https://www.heart.org/en/health-topics/heart-failure/diagnosing-heart-failure/ejection-fraction-heart-failure-measurement#:~:text=Ejection%20fraction%20(EF)%20is%20a,pushed%20out%20with%20each%20heartbeat  NIHARIKA Goode (2020, December 15). Heart failure with preserved ejection fraction: Clinical manifestations and diagnosis. Must See [employer]. https://www.Utrip.com/contents/heart-failure-with-preserved-ejection-fraction-clinical-manifestations-and-diagnosis.  ICD-10-CM/PCS Coding Clinic Third Quarter ICD-10, Effective with discharges: September 8, 2020 [facility] § Heart failure with mid-range or mildly reduced ejection fraction (2020).  ICD-10-CM/PCS Coding Clinic Third Quarter ICD-10, Effective with discharges: September 8, 2020 [facility] § Heart failure with recovered ejection fraction (2020).  Form No. 74413

## 2023-07-28 NOTE — PLAN OF CARE
Problem: Adult Inpatient Plan of Care  Goal: Plan of Care Review  Outcome: Ongoing, Progressing  Goal: Patient-Specific Goal (Individualized)  Outcome: Ongoing, Progressing  Goal: Absence of Hospital-Acquired Illness or Injury  Outcome: Ongoing, Progressing  Goal: Optimal Comfort and Wellbeing  Outcome: Ongoing, Progressing  Goal: Readiness for Transition of Care  Outcome: Ongoing, Progressing     Problem: Bariatric Environmental Safety  Goal: Safety Maintained with Care  Outcome: Ongoing, Progressing     Problem: Skin Injury Risk Increased  Goal: Skin Health and Integrity  Outcome: Ongoing, Progressing     Problem: Adjustment to Illness (Chronic Kidney Disease)  Goal: Optimal Coping with Chronic Illness  Outcome: Ongoing, Progressing     Problem: Electrolyte Imbalance (Chronic Kidney Disease)  Goal: Electrolyte Balance  Outcome: Ongoing, Progressing     Problem: Fluid Volume Excess (Chronic Kidney Disease)  Goal: Fluid Balance  Outcome: Ongoing, Progressing     Problem: Functional Decline (Chronic Kidney Disease)  Goal: Optimal Functional Ability  Outcome: Ongoing, Progressing     Problem: Hematologic Alteration (Chronic Kidney Disease)  Goal: Absence of Anemia Signs and Symptoms  Outcome: Ongoing, Progressing     Problem: Oral Intake Inadequate (Chronic Kidney Disease)  Goal: Optimal Oral Intake  Outcome: Ongoing, Progressing     Problem: Pain (Chronic Kidney Disease)  Goal: Acceptable Pain Control  Outcome: Ongoing, Progressing     Problem: Renal Function Impairment (Chronic Kidney Disease)  Goal: Minimize Renal Failure Effects  Outcome: Ongoing, Progressing     Problem: Device-Related Complication Risk (Hemodialysis)  Goal: Safe, Effective Therapy Delivery  Outcome: Ongoing, Progressing     Problem: Hemodynamic Instability (Hemodialysis)  Goal: Effective Tissue Perfusion  Outcome: Ongoing, Progressing     Problem: Infection (Hemodialysis)  Goal: Absence of Infection Signs and Symptoms  Outcome: Ongoing,  Progressing

## 2023-07-29 LAB
BACTERIA BLD CULT: NORMAL
BACTERIA BLD CULT: NORMAL
POCT GLUCOSE: 118 MG/DL (ref 70–110)
POCT GLUCOSE: 132 MG/DL (ref 70–110)
POCT GLUCOSE: 145 MG/DL (ref 70–110)
POCT GLUCOSE: 154 MG/DL (ref 70–110)
POCT GLUCOSE: 158 MG/DL (ref 70–110)
POCT GLUCOSE: 164 MG/DL (ref 70–110)

## 2023-07-29 PROCEDURE — 99900035 HC TECH TIME PER 15 MIN (STAT)

## 2023-07-29 PROCEDURE — 63600175 PHARM REV CODE 636 W HCPCS: Performed by: FAMILY MEDICINE

## 2023-07-29 PROCEDURE — 94799 UNLISTED PULMONARY SVC/PX: CPT

## 2023-07-29 PROCEDURE — 27000221 HC OXYGEN, UP TO 24 HOURS

## 2023-07-29 PROCEDURE — 94660 CPAP INITIATION&MGMT: CPT

## 2023-07-29 PROCEDURE — 25000242 PHARM REV CODE 250 ALT 637 W/ HCPCS: Performed by: FAMILY MEDICINE

## 2023-07-29 PROCEDURE — 25000003 PHARM REV CODE 250: Performed by: FAMILY MEDICINE

## 2023-07-29 PROCEDURE — 21400001 HC TELEMETRY ROOM

## 2023-07-29 PROCEDURE — 94640 AIRWAY INHALATION TREATMENT: CPT

## 2023-07-29 PROCEDURE — 94761 N-INVAS EAR/PLS OXIMETRY MLT: CPT

## 2023-07-29 PROCEDURE — 97161 PT EVAL LOW COMPLEX 20 MIN: CPT

## 2023-07-29 PROCEDURE — 63700000 PHARM REV CODE 250 ALT 637 W/O HCPCS: Performed by: FAMILY MEDICINE

## 2023-07-29 RX ADMIN — FLUCONAZOLE 100 MG: 100 TABLET ORAL at 10:07

## 2023-07-29 RX ADMIN — MUPIROCIN 1 G: 20 OINTMENT TOPICAL at 09:07

## 2023-07-29 RX ADMIN — APIXABAN 2.5 MG: 2.5 TABLET, FILM COATED ORAL at 08:07

## 2023-07-29 RX ADMIN — METOPROLOL SUCCINATE 50 MG: 50 TABLET, EXTENDED RELEASE ORAL at 10:07

## 2023-07-29 RX ADMIN — IPRATROPIUM BROMIDE AND ALBUTEROL SULFATE 3 ML: 2.5; .5 SOLUTION RESPIRATORY (INHALATION) at 07:07

## 2023-07-29 RX ADMIN — APIXABAN 2.5 MG: 2.5 TABLET, FILM COATED ORAL at 10:07

## 2023-07-29 RX ADMIN — ZOLPIDEM TARTRATE 5 MG: 5 TABLET ORAL at 08:07

## 2023-07-29 RX ADMIN — IPRATROPIUM BROMIDE AND ALBUTEROL SULFATE 3 ML: 2.5; .5 SOLUTION RESPIRATORY (INHALATION) at 06:07

## 2023-07-29 RX ADMIN — BUDESONIDE INHALATION 0.5 MG: 0.5 SUSPENSION RESPIRATORY (INHALATION) at 07:07

## 2023-07-29 RX ADMIN — IPRATROPIUM BROMIDE AND ALBUTEROL SULFATE 3 ML: 2.5; .5 SOLUTION RESPIRATORY (INHALATION) at 11:07

## 2023-07-29 RX ADMIN — METOPROLOL SUCCINATE 50 MG: 50 TABLET, EXTENDED RELEASE ORAL at 08:07

## 2023-07-29 RX ADMIN — BUDESONIDE INHALATION 0.5 MG: 0.5 SUSPENSION RESPIRATORY (INHALATION) at 06:07

## 2023-07-29 RX ADMIN — FLUOXETINE 40 MG: 10 CAPSULE ORAL at 10:07

## 2023-07-29 RX ADMIN — PANTOPRAZOLE SODIUM 40 MG: 40 TABLET, DELAYED RELEASE ORAL at 10:07

## 2023-07-29 RX ADMIN — INSULIN ASPART 1 UNITS: 100 INJECTION, SOLUTION INTRAVENOUS; SUBCUTANEOUS at 09:07

## 2023-07-29 RX ADMIN — LEVOTHYROXINE SODIUM 200 MCG: 100 TABLET ORAL at 10:07

## 2023-07-29 RX ADMIN — IPRATROPIUM BROMIDE AND ALBUTEROL SULFATE 3 ML: 2.5; .5 SOLUTION RESPIRATORY (INHALATION) at 03:07

## 2023-07-29 RX ADMIN — GABAPENTIN 100 MG: 100 CAPSULE ORAL at 08:07

## 2023-07-29 RX ADMIN — INSULIN ASPART 2 UNITS: 100 INJECTION, SOLUTION INTRAVENOUS; SUBCUTANEOUS at 11:07

## 2023-07-29 RX ADMIN — GABAPENTIN 100 MG: 100 CAPSULE ORAL at 10:07

## 2023-07-29 RX ADMIN — AZITHROMYCIN MONOHYDRATE 500 MG: 500 INJECTION, POWDER, LYOPHILIZED, FOR SOLUTION INTRAVENOUS at 10:07

## 2023-07-29 RX ADMIN — ACETAMINOPHEN 500 MG: 500 TABLET, FILM COATED ORAL at 11:07

## 2023-07-29 RX ADMIN — ROPINIROLE HYDROCHLORIDE 0.5 MG: 0.25 TABLET, FILM COATED ORAL at 08:07

## 2023-07-29 RX ADMIN — ATORVASTATIN CALCIUM 40 MG: 40 TABLET, FILM COATED ORAL at 04:07

## 2023-07-29 RX ADMIN — MUPIROCIN 1 G: 20 OINTMENT TOPICAL at 10:07

## 2023-07-29 RX ADMIN — CEFTRIAXONE SODIUM 1 G: 1 INJECTION, POWDER, FOR SOLUTION INTRAMUSCULAR; INTRAVENOUS at 09:07

## 2023-07-29 NOTE — NURSING
Informed Dr. Swain patient's BP 90's/50's with heart rate in the 80's and metoprolol held. Per Dr. Swain, hold metoprolol and continue to monitor.--ec,lpn

## 2023-07-29 NOTE — NURSING
Informed Dr. Swain of the blood pressure and heart rate charted. 90's/50's and most recent 102/66 with heart rate in the 90's. Per Dr. Swain, hold diltiazem and metoprolol until Dr. Velasco rounds this AM. Informed day shift nurse.--ec,lpn

## 2023-07-29 NOTE — PT/OT/SLP EVAL
"Physical Therapy Evaluation    Patient Name:  Danae Sanchez   MRN:  [id number]    Recommendations:     Discharge Recommendations: nursing facility, basic   Discharge Equipment Recommendations: none   Barriers to discharge: None    Assessment:     Danae Sanchez is a 78 y.o. female admitted with a medical diagnosis of <principal problem not specified>.  She presents with the following impairments/functional limitations:   difficulty walking.    Rehab Prognosis: Good; patient would benefit from acute skilled PT services to address these deficits and reach maximum level of function.    Recent Surgery: * No surgery found *      Plan:     During this hospitalization, patient to be seen 5 x/week to address the identified rehab impairments via therapeutic exercises, therapeutic activities, gait training and progress toward the following goals:    Plan of Care Expires:       Subjective     Chief Complaint: "I need a walker in my room here"  Patient/Family Comments/goals: to return to I function, living at the NH  Pain/Comfort:       Patients cultural, spiritual, Jehovah's witness conflicts given the current situation:      Living Environment:  [facility]  Prior to admission, patients level of function was Geoff amb with RW.  Equipment used at home: walker, rolling.  DME owned (not currently used): none.  Upon discharge, patient will have assistance from NH staff.    Objective:     Communicated with pt prior to session.  Patient found HOB elevated with    upon PT entry to room.    General Precautions: Standard,    Orthopedic Precautions:    Braces:    Respiratory Status: Room air    Exams:  RLE Strength: WFL  LLE Strength: WFL    Functional Mobility:  Gait: Pt walked to bathroom with supervision and RW      AM-PAC 6 CLICK MOBILITY  Total Score:18       Treatment & Education:  Issued RW for pt room to walk with staff ad kia over weekend    Patient left HOB elevated with all lines intact.    GOALS:   Multidisciplinary Problems  "      Physical Therapy Goals          Problem: Physical Therapy    Goal Priority Disciplines Outcome Goal Variances Interventions   Physical Therapy Goal     PT, PT/OT Ongoing, Progressing     Description: 1.  Pt will be able to amb In room and halls with RW supervision  2.  Pt will tf with RW SBA to chair  3.  Pt will be I HEP                       History:     Past Medical History:   Diagnosis Date    Anxiety disorder, unspecified     Chronic kidney disease, unspecified     Congestive heart failure (CHF)     Depression     Diabetes mellitus     GERD (gastroesophageal reflux disease)     History of anemia due to chronic kidney disease     Hypercholesterolemia     Hypertension     Hypothyroidism, unspecified     Parkinsonism     Paroxysmal atrial fibrillation        Past Surgical History:   Procedure Laterality Date    CHOLECYSTECTOMY      Dialysis shunt Left     HYSTERECTOMY         Time Tracking:     PT Received On: 07/29/23  PT Start Time: 1330     PT Stop Time: 1400  PT Total Time (min): 30 min     Billable Minutes: Evaluation 30 07/29/2023

## 2023-07-30 LAB
ALBUMIN SERPL-MCNC: 3.1 G/DL (ref 3.4–4.8)
ALBUMIN/GLOB SERPL: 1 RATIO (ref 1.1–2)
ALP SERPL-CCNC: 163 UNIT/L (ref 40–150)
ALT SERPL-CCNC: 26 UNIT/L (ref 0–55)
AST SERPL-CCNC: 30 UNIT/L (ref 5–34)
BASOPHILS # BLD AUTO: 0.11 X10(3)/MCL
BASOPHILS NFR BLD AUTO: 1.3 %
BILIRUBIN DIRECT+TOT PNL SERPL-MCNC: 0.5 MG/DL
BUN SERPL-MCNC: 39 MG/DL (ref 9.8–20.1)
CALCIUM SERPL-MCNC: 8.3 MG/DL (ref 8.4–10.2)
CHLORIDE SERPL-SCNC: 97 MMOL/L (ref 98–107)
CO2 SERPL-SCNC: 27 MMOL/L (ref 23–31)
CREAT SERPL-MCNC: 5.67 MG/DL (ref 0.55–1.02)
EOSINOPHIL # BLD AUTO: 0.45 X10(3)/MCL (ref 0–0.9)
EOSINOPHIL NFR BLD AUTO: 5.4 %
ERYTHROCYTE [DISTWIDTH] IN BLOOD BY AUTOMATED COUNT: 15.6 % (ref 11.5–17)
GFR SERPLBLD CREATININE-BSD FMLA CKD-EPI: 7 MLS/MIN/1.73/M2
GLOBULIN SER-MCNC: 3.1 GM/DL (ref 2.4–3.5)
GLUCOSE SERPL-MCNC: 109 MG/DL (ref 82–115)
HCT VFR BLD AUTO: 34.5 % (ref 37–47)
HGB BLD-MCNC: 10.5 G/DL (ref 12–16)
IMM GRANULOCYTES # BLD AUTO: 0.04 X10(3)/MCL (ref 0–0.04)
IMM GRANULOCYTES NFR BLD AUTO: 0.5 %
LYMPHOCYTES # BLD AUTO: 1.38 X10(3)/MCL (ref 0.6–4.6)
LYMPHOCYTES NFR BLD AUTO: 16.6 %
MAGNESIUM SERPL-MCNC: 2 MG/DL (ref 1.6–2.6)
MCH RBC QN AUTO: 30.2 PG (ref 27–31)
MCHC RBC AUTO-ENTMCNC: 30.4 G/DL (ref 33–36)
MCV RBC AUTO: 99.1 FL (ref 80–94)
MONOCYTES # BLD AUTO: 0.82 X10(3)/MCL (ref 0.1–1.3)
MONOCYTES NFR BLD AUTO: 9.9 %
NEUTROPHILS # BLD AUTO: 5.52 X10(3)/MCL (ref 2.1–9.2)
NEUTROPHILS NFR BLD AUTO: 66.3 %
PHOSPHATE SERPL-MCNC: 4.4 MG/DL (ref 2.3–4.7)
PLATELET # BLD AUTO: 179 X10(3)/MCL (ref 130–400)
PMV BLD AUTO: 10.9 FL (ref 7.4–10.4)
POCT GLUCOSE: 128 MG/DL (ref 70–110)
POCT GLUCOSE: 130 MG/DL (ref 70–110)
POCT GLUCOSE: 137 MG/DL (ref 70–110)
POCT GLUCOSE: 143 MG/DL (ref 70–110)
POTASSIUM SERPL-SCNC: 3.9 MMOL/L (ref 3.5–5.1)
PROT SERPL-MCNC: 6.2 GM/DL (ref 5.8–7.6)
RBC # BLD AUTO: 3.48 X10(6)/MCL (ref 4.2–5.4)
SODIUM SERPL-SCNC: 137 MMOL/L (ref 136–145)
WBC # SPEC AUTO: 8.32 X10(3)/MCL (ref 4.5–11.5)

## 2023-07-30 PROCEDURE — 63700000 PHARM REV CODE 250 ALT 637 W/O HCPCS: Performed by: FAMILY MEDICINE

## 2023-07-30 PROCEDURE — 84100 ASSAY OF PHOSPHORUS: CPT | Performed by: FAMILY MEDICINE

## 2023-07-30 PROCEDURE — 94640 AIRWAY INHALATION TREATMENT: CPT

## 2023-07-30 PROCEDURE — 21400001 HC TELEMETRY ROOM

## 2023-07-30 PROCEDURE — 94761 N-INVAS EAR/PLS OXIMETRY MLT: CPT

## 2023-07-30 PROCEDURE — 27000221 HC OXYGEN, UP TO 24 HOURS

## 2023-07-30 PROCEDURE — 85025 COMPLETE CBC W/AUTO DIFF WBC: CPT | Performed by: FAMILY MEDICINE

## 2023-07-30 PROCEDURE — 83735 ASSAY OF MAGNESIUM: CPT | Performed by: FAMILY MEDICINE

## 2023-07-30 PROCEDURE — 99900035 HC TECH TIME PER 15 MIN (STAT)

## 2023-07-30 PROCEDURE — 25000242 PHARM REV CODE 250 ALT 637 W/ HCPCS: Performed by: FAMILY MEDICINE

## 2023-07-30 PROCEDURE — 63600175 PHARM REV CODE 636 W HCPCS: Performed by: FAMILY MEDICINE

## 2023-07-30 PROCEDURE — 25000003 PHARM REV CODE 250: Performed by: FAMILY MEDICINE

## 2023-07-30 PROCEDURE — 80053 COMPREHEN METABOLIC PANEL: CPT | Performed by: FAMILY MEDICINE

## 2023-07-30 RX ADMIN — MUPIROCIN 1 G: 20 OINTMENT TOPICAL at 08:07

## 2023-07-30 RX ADMIN — CEFTRIAXONE SODIUM 1 G: 1 INJECTION, POWDER, FOR SOLUTION INTRAMUSCULAR; INTRAVENOUS at 06:07

## 2023-07-30 RX ADMIN — LEVOTHYROXINE SODIUM 200 MCG: 100 TABLET ORAL at 08:07

## 2023-07-30 RX ADMIN — IPRATROPIUM BROMIDE AND ALBUTEROL SULFATE 3 ML: 2.5; .5 SOLUTION RESPIRATORY (INHALATION) at 07:07

## 2023-07-30 RX ADMIN — BUDESONIDE INHALATION 0.5 MG: 0.5 SUSPENSION RESPIRATORY (INHALATION) at 07:07

## 2023-07-30 RX ADMIN — FLUCONAZOLE 100 MG: 100 TABLET ORAL at 08:07

## 2023-07-30 RX ADMIN — ZOLPIDEM TARTRATE 5 MG: 5 TABLET ORAL at 09:07

## 2023-07-30 RX ADMIN — GABAPENTIN 100 MG: 100 CAPSULE ORAL at 09:07

## 2023-07-30 RX ADMIN — METOPROLOL SUCCINATE 50 MG: 50 TABLET, EXTENDED RELEASE ORAL at 08:07

## 2023-07-30 RX ADMIN — FLUOXETINE 40 MG: 10 CAPSULE ORAL at 08:07

## 2023-07-30 RX ADMIN — APIXABAN 2.5 MG: 2.5 TABLET, FILM COATED ORAL at 09:07

## 2023-07-30 RX ADMIN — MUPIROCIN 1 G: 20 OINTMENT TOPICAL at 09:07

## 2023-07-30 RX ADMIN — IPRATROPIUM BROMIDE AND ALBUTEROL SULFATE 3 ML: 2.5; .5 SOLUTION RESPIRATORY (INHALATION) at 11:07

## 2023-07-30 RX ADMIN — ATORVASTATIN CALCIUM 40 MG: 40 TABLET, FILM COATED ORAL at 04:07

## 2023-07-30 RX ADMIN — IPRATROPIUM BROMIDE AND ALBUTEROL SULFATE 3 ML: 2.5; .5 SOLUTION RESPIRATORY (INHALATION) at 03:07

## 2023-07-30 RX ADMIN — GABAPENTIN 100 MG: 100 CAPSULE ORAL at 08:07

## 2023-07-30 RX ADMIN — METOPROLOL SUCCINATE 50 MG: 50 TABLET, EXTENDED RELEASE ORAL at 09:07

## 2023-07-30 RX ADMIN — IPRATROPIUM BROMIDE AND ALBUTEROL SULFATE 3 ML: 2.5; .5 SOLUTION RESPIRATORY (INHALATION) at 10:07

## 2023-07-30 RX ADMIN — APIXABAN 2.5 MG: 2.5 TABLET, FILM COATED ORAL at 08:07

## 2023-07-30 RX ADMIN — ROPINIROLE HYDROCHLORIDE 0.5 MG: 0.25 TABLET, FILM COATED ORAL at 09:07

## 2023-07-30 RX ADMIN — AZITHROMYCIN MONOHYDRATE 500 MG: 500 INJECTION, POWDER, LYOPHILIZED, FOR SOLUTION INTRAVENOUS at 09:07

## 2023-07-30 RX ADMIN — PANTOPRAZOLE SODIUM 40 MG: 40 TABLET, DELAYED RELEASE ORAL at 08:07

## 2023-07-30 NOTE — PROGRESS NOTES
[facility]                     [address] [facility] [address]    PATIENT NAME:       AVELINO ARCHIBALD  YOB: 1945  CSN:                [id number]   MRN:                [id number]  ADMIT DATE:         07/24/2023 12:25:00  PHYSICIAN:          Ludin Velasco MD                            PROGRESS NOTE    DATE:  07/29/2023 00:00:00    SUBJECTIVE:  She remains in the hospital secondary to her pneumonia with hypoxia   and volume overload.  Currently, she is improving, lungs are improving.  She is   receiving O2 and respiratory treatments.    PHYSICAL EXAMINATION:  VITAL SIGNS:  Stable.  Pulse ox is satisfactory on room air.  Labs, x-rays,   medications, consultations are on the chart, and reviewed.   HEART:  Regular rate and rhythm.  LUNGS:  Coarse breath sounds and expiratory wheezing bilaterally, but it is   improving.  No respiratory distress at this time.    ABDOMEN:  Obese, nontender.  EXTREMITIES:  Trace lower extremity edema bilaterally.    ASSESSMENT:    1. Pneumonia with hypoxia.  2. End-stage renal disease with volume overload on admit.  3. Hypotension.  4. Multiple other medical problems including morbid obesity.    PLAN:  Continue current care.  Recheck labs in the morning.  Continue dialysis   as per Nephrology.  Continue antibiotics, breathing treatments and o2 as   tolerated.        ______________________________  Ludin Velasco MD    PBS/AQS  DD:  07/30/2023  Time:  01:44PM  DT:  07/30/2023  Time:  02:39PM  Job #:  [id number]      PROGRESS NOTE

## 2023-07-30 NOTE — PROGRESS NOTES
[facility]                     [address] [facility] [address]    PATIENT NAME:       AVELINO ARCHIBALD  YOB: 1945  CSN:                [id number]   MRN:                [id number]  ADMIT DATE:         07/24/2023 12:25:00  PHYSICIAN:          Ludin Velasco MD                            PROGRESS NOTE    DATE:  07/27/2023 00:00:00    SUBJECTIVE:  She has end-stage renal disease.  She has been dialyzed several   times secondary to her volume overload on admit.  She also has an underlying   pneumonia.  Her blood pressure has been on the low side.  We are holding her   blood pressure medicines except for Cardizem and metoprolol.    PHYSICAL EXAMINATION:  GENERAL:  The patient is obese, lying in bed, in no acute distress.    HEART:  Regular rate and rhythm.  LUNGS:  Have expiratory wheezes and rhonchi.  She has tachypnea.  ABDOMEN:  Nontender, nondistended.  EXTREMITIES:  Trace lower extremity edema.    ASSESSMENT:    1. Hypoxia secondary to volume overload and pneumonia.  2. End-stage renal disease, on dialysis.  3. Low blood pressure secondary to dialyzed fluid.  4. Multiple other medical problems as per problem list.    PLAN:  Continue current care and O2.  Continue breathing treatments,   antibiotics.  Appreciate help from Nephrology.        ______________________________  Ludin Velasco MD    PBS/AQS  DD:  07/30/2023  Time:  01:40PM  DT:  07/30/2023  Time:  02:32PM  Job #:  [id number]      PROGRESS NOTE

## 2023-07-30 NOTE — PROGRESS NOTES
[facility]                     [address] [facility] [address]    PATIENT NAME:       AVELINO ARCHIBALD  YOB: 1945  CSN:                [id number]   MRN:                [id number]  ADMIT DATE:         07/24/2023 12:25:00  PHYSICIAN:          Ludin Velasco MD                            PROGRESS NOTE    DATE:  07/30/2023 00:00:00    SUBJECTIVE:  She is in the hospital secondary to volume overload and an   underlying pneumonia.  Today she feels much better.  She is in the bathroom   using the bathroom.  She ambulated there herself.    PHYSICAL EXAMINATION:  VITAL SIGNS:  Pulse ox is 97% on 2 L of oxygen per nasal cannula.  Blood   pressure is 107/72, pulse is 102 currently dxt's are stable.  Labs, x-rays,   medications are on the chart and reviewed.    HEART:  regular rhythm and rhythm.  LUNGS:  Minimal expiratory wheezes, but she is much clearer than yesterday.    ABDOMEN:  Obese.    EXTREMITIES:  No significant edema.    ASSESSMENT:    1. Pneumonia with hypoxia.  2. Volume overload.  3. End-stage renal disease currently on dialysis.  4. Morbid obesity.  5. Hypotension secondary to her dialysis.  6. Low albumin.  7. Anemia currently stable.  8. Multiple other medical problems as per problem list.    PLAN:  Continue current care.  Most likely will dialyze tomorrow and we will   start looking to get her back to the nursing home at that time if we are able to   wean her oxygen.  Continue antibiotics Rocephin and Zithromax.  Continue   breathing treatments.      ______________________________  Ludin Velasco MD    PBS/AQS  DD:  07/30/2023  Time:  01:37PM  DT:  07/30/2023  Time:  02:23PM  Job #:  [id number]      PROGRESS NOTE

## 2023-07-30 NOTE — PROGRESS NOTES
[facility]                     [address] [facility] [address]    PATIENT NAME:       AVELINO ARCHIBALD  YOB: 1945  CSN:                [id number]   MRN:                [id number]  ADMIT DATE:         07/24/2023 12:25:00  PHYSICIAN:          Ludin Velasco MD                            PROGRESS NOTE    DATE:  07/28/2023 00:00:00    SUBJECTIVE:  She remains in the hospital secondary to volume overload and   pneumonia with hypoxia.  We have dialyzed her several times.  She does have low   blood pressure currently.  She feels very weak and short of breath.    PHYSICAL EXAMINATION:  VITAL SIGNS:  The patient's vitals are on the chart and reviewed.  HEART:  Regular rate and rhythm.  LUNGS:  Coarse breath sounds with expiratory wheezes bilaterally.    ABDOMEN:  Obese.  EXTREMITIES:  1+ edema bilateral lower ext.    ASSESSMENT:    1. Pneumonia with hypoxia.  2. Volume overload.  3. End-stage renal disease, on dialysis.  4. Hypertension with hypotension at this time.  She is asymptomatic.    PLAN:  Continue current care.  Recheck labs in the morning.  Continue dialysis.        ______________________________  Ludin Velasco MD    PBS/AQS  DD:  07/30/2023  Time:  01:42PM  DT:  07/30/2023  Time:  02:35PM  Job #:  [id number]      PROGRESS NOTE

## 2023-07-31 LAB
GLUCOSE SERPL-MCNC: 172 MG/DL (ref 70–110)
POCT GLUCOSE: 115 MG/DL (ref 70–110)
POCT GLUCOSE: 136 MG/DL (ref 70–110)
POCT GLUCOSE: 147 MG/DL (ref 70–110)
POCT GLUCOSE: 172 MG/DL (ref 70–110)

## 2023-07-31 PROCEDURE — 94640 AIRWAY INHALATION TREATMENT: CPT

## 2023-07-31 PROCEDURE — 80100016 HC MAINTENANCE HEMODIALYSIS

## 2023-07-31 PROCEDURE — 94761 N-INVAS EAR/PLS OXIMETRY MLT: CPT

## 2023-07-31 PROCEDURE — 27000221 HC OXYGEN, UP TO 24 HOURS

## 2023-07-31 PROCEDURE — 25000242 PHARM REV CODE 250 ALT 637 W/ HCPCS: Performed by: FAMILY MEDICINE

## 2023-07-31 PROCEDURE — 99900035 HC TECH TIME PER 15 MIN (STAT)

## 2023-07-31 PROCEDURE — 97116 GAIT TRAINING THERAPY: CPT

## 2023-07-31 PROCEDURE — 25000003 PHARM REV CODE 250: Performed by: FAMILY MEDICINE

## 2023-07-31 PROCEDURE — 21400001 HC TELEMETRY ROOM

## 2023-07-31 PROCEDURE — 63600175 PHARM REV CODE 636 W HCPCS: Performed by: FAMILY MEDICINE

## 2023-07-31 PROCEDURE — 63700000 PHARM REV CODE 250 ALT 637 W/O HCPCS: Performed by: FAMILY MEDICINE

## 2023-07-31 PROCEDURE — 97530 THERAPEUTIC ACTIVITIES: CPT

## 2023-07-31 RX ADMIN — IPRATROPIUM BROMIDE AND ALBUTEROL SULFATE 3 ML: 2.5; .5 SOLUTION RESPIRATORY (INHALATION) at 03:07

## 2023-07-31 RX ADMIN — METOPROLOL SUCCINATE 50 MG: 50 TABLET, EXTENDED RELEASE ORAL at 09:07

## 2023-07-31 RX ADMIN — IPRATROPIUM BROMIDE AND ALBUTEROL SULFATE 3 ML: 2.5; .5 SOLUTION RESPIRATORY (INHALATION) at 07:07

## 2023-07-31 RX ADMIN — ATORVASTATIN CALCIUM 40 MG: 40 TABLET, FILM COATED ORAL at 04:07

## 2023-07-31 RX ADMIN — ROPINIROLE HYDROCHLORIDE 0.5 MG: 0.25 TABLET, FILM COATED ORAL at 09:07

## 2023-07-31 RX ADMIN — MUPIROCIN: 20 OINTMENT TOPICAL at 09:07

## 2023-07-31 RX ADMIN — BUDESONIDE INHALATION 0.5 MG: 0.5 SUSPENSION RESPIRATORY (INHALATION) at 07:07

## 2023-07-31 RX ADMIN — PANTOPRAZOLE SODIUM 40 MG: 40 TABLET, DELAYED RELEASE ORAL at 09:07

## 2023-07-31 RX ADMIN — LEVOTHYROXINE SODIUM 200 MCG: 100 TABLET ORAL at 09:07

## 2023-07-31 RX ADMIN — IPRATROPIUM BROMIDE AND ALBUTEROL SULFATE 3 ML: 2.5; .5 SOLUTION RESPIRATORY (INHALATION) at 01:07

## 2023-07-31 RX ADMIN — ACETAMINOPHEN 500 MG: 500 TABLET, FILM COATED ORAL at 09:07

## 2023-07-31 RX ADMIN — APIXABAN 2.5 MG: 2.5 TABLET, FILM COATED ORAL at 09:07

## 2023-07-31 RX ADMIN — MUPIROCIN 1 G: 20 OINTMENT TOPICAL at 09:07

## 2023-07-31 RX ADMIN — GABAPENTIN 100 MG: 100 CAPSULE ORAL at 09:07

## 2023-07-31 RX ADMIN — CEFTRIAXONE SODIUM 1 G: 1 INJECTION, POWDER, FOR SOLUTION INTRAMUSCULAR; INTRAVENOUS at 04:07

## 2023-07-31 RX ADMIN — FLUCONAZOLE 100 MG: 100 TABLET ORAL at 09:07

## 2023-07-31 RX ADMIN — ZOLPIDEM TARTRATE 5 MG: 5 TABLET ORAL at 09:07

## 2023-07-31 RX ADMIN — IPRATROPIUM BROMIDE AND ALBUTEROL SULFATE 3 ML: 2.5; .5 SOLUTION RESPIRATORY (INHALATION) at 11:07

## 2023-07-31 RX ADMIN — FLUOXETINE 40 MG: 10 CAPSULE ORAL at 09:07

## 2023-07-31 RX ADMIN — AZITHROMYCIN MONOHYDRATE 500 MG: 500 INJECTION, POWDER, LYOPHILIZED, FOR SOLUTION INTRAVENOUS at 09:07

## 2023-07-31 NOTE — PROGRESS NOTES
[facility]  [address] [place] [address]  Phone: [phone]    Department of Nephrology  Progress Note      PATIENT NAME: Danae Sanchez  MRN: [id number]  TODAY'S DATE: 07/31/2023  ADMIT DATE: 7/24/2023    SUBJECTIVE     PRINCIPLE PROBLEM: <principal problem not specified>    INTERVAL HISTORY:  HEMODIALYSIS NOTE  7/31/2023  Patient is a 78-year-old  female admitted with hypovolemia, CHF and pneumonia.  Currently on treatment using an non reuse 180, 3 K bath, 2.5 calcium and running with a blood flow rate of 400 and dialysate flow rate of 600.  Attempting 3 L ultrafiltration.  Total ultrafiltration today is approximately 16.5 L.    Review of patient's allergies indicates:   Allergen Reactions    Codeine Nausea And Vomiting    Cortisone Nausea Only and Nausea And Vomiting     only allergic to oral  only allergic to oral      Lisinopril Nausea And Vomiting and Rash    Morphine Itching       Review of Systems   Constitutional: Negative.    Respiratory: Negative.     Cardiovascular: Negative.    Genitourinary: Negative.    Neurological: Negative.        OBJECTIVE     VITAL SIGNS (Most Recent)  Temp: 98.7 °F (37.1 °C) (07/31/23 0732)  Pulse: 102 (07/31/23 0732)  Resp: 20 (07/31/23 0732)  BP: (!) 94/59 (reported b/p to nurse) (07/31/23 0732)  SpO2: (!) 92 % (07/31/23 0732)    VENTILATION STATUS  Resp: 20 (07/31/23 0732)  SpO2: (!) 92 % (07/31/23 0732)           I & O (Last 24H):  Intake/Output Summary (Last 24 hours) at 7/31/2023 1257  Last data filed at 7/31/2023 0800  Gross per 24 hour   Intake 926.76 ml   Output --   Net 926.76 ml       WEIGHTS  Wt Readings from Last 3 Encounters:   07/28/23 0531 (!) 138.5 kg (305 lb 5.4 oz)   07/27/23 0436 (!) 138.5 kg (305 lb 5.4 oz)   07/26/23 0511 (!) 137.3 kg (302 lb 11.1 oz)   07/25/23 0434 (!) 137.5 kg (303 lb 2.1 oz)   07/24/23 1601 (!) 138.8 kg (306 lb)   07/24/23 1221 (!) 138.8 kg (306 lb)   06/21/23 1106 (!) 138.3 kg (305 lb)  "  05/01/23 0406 (!) 145.2 kg (320 lb)       Physical Exam  Constitutional:       Appearance: She is not ill-appearing.   Eyes:      Extraocular Movements: Extraocular movements intact.      Pupils: Pupils are equal, round, and reactive to light.   Cardiovascular:      Rate and Rhythm: Normal rate.      Heart sounds: No murmur heard.  Abdominal:      Tenderness: There is no right CVA tenderness or left CVA tenderness.   Musculoskeletal:      Cervical back: Normal range of motion and neck supple.      Right lower leg: No edema.      Left lower leg: No edema.   Skin:     Capillary Refill: Capillary refill takes less than 2 seconds.   Neurological:      General: No focal deficit present.      Mental Status: She is alert.         SCHEDULED MEDS:   albuterol-ipratropium  3 mL Nebulization Q6H WAKE    apixaban  2.5 mg Oral BID    atorvastatin  40 mg Oral Daily    azithromycin  500 mg Intravenous Q24H    budesonide  0.5 mg Nebulization BID    cefTRIAXone (ROCEPHIN) IVPB  1 g Intravenous Q24H    fluconazole  100 mg Oral Daily    FLUoxetine  40 mg Oral Daily    gabapentin  100 mg Oral BID    levothyroxine  200 mcg Oral Daily    metoprolol succinate  50 mg Oral BID    mupirocin   Nasal BID    pantoprazole  40 mg Oral Daily    rOPINIRole  0.5 mg Oral QHS    zolpidem  5 mg Oral QHS       CONTINUOUS INFUSIONS:    PRN MEDS:acetaminophen, albuterol-ipratropium, glucagon (human recombinant), glucose, glucose, insulin aspart U-100, naloxone, sodium chloride 0.9%    LABS AND DIAGNOSTICS     CBC LAST 3 DAYS  Recent Labs   Lab 07/26/23  0346 07/28/23  0242 07/30/23  0304   WBC 8.19 9.04 8.32   RBC 3.51* 3.50* 3.48*   HGB 10.7* 10.6* 10.5*   HCT 36.0* 35.9* 34.5*   .6* 102.6* 99.1*   MCH 30.5 30.3 30.2   MCHC 29.7* 29.5* 30.4*   RDW 16.3 16.0 15.6   * 128* 179   MPV 11.4* 10.8* 10.9*       COAGULATION LAST 3 DAYS  No results for input(s): "LABPT", "INR", "APTT" in the last 168 hours.    CHEMISTRY LAST 3 DAYS  Recent Labs " "  Lab 07/26/23  0346 07/28/23  0242 07/30/23  0304    138 137   K 4.5 3.9 3.9   CO2 27 26 27   BUN 34.0* 42.0* 39.0*   CREATININE 4.45* 5.45* 5.67*   CALCIUM 8.5 8.7 8.3*   MG 2.50 2.30 2.00   ALBUMIN 3.2* 3.1* 3.1*   ALKPHOS 144 151* 163*   ALT 16 18 26   AST 24 22 30   BILITOT 0.6 0.7 0.5       CARDIAC PROFILE LAST 3 DAYS  Recent Labs   Lab 07/24/23  1310   BNP 1,132.4*   TROPONINI 0.016       ENDOCRINE LAST 3 DAYS  No results for input(s): "TSH", "PROCAL" in the last 168 hours.    LAST ARTERIAL BLOOD GAS  ABG  No results for input(s): "PH", "PO2", "PCO2", "HCO3", "BE" in the last 168 hours.    LAST 7 DAYS MICROBIOLOGY   Microbiology Results (last 7 days)       Procedure Component Value Units Date/Time    Blood Culture [[id number]]  (Normal) Collected: 07/24/23 1300    Order Status: Completed Specimen: Blood Updated: 07/29/23 1900     CULTURE, BLOOD ([facility]) No Growth at 5 days    Blood Culture [[id number]]  (Normal) Collected: 07/24/23 1300    Order Status: Completed Specimen: Blood Updated: 07/29/23 1900     CULTURE, BLOOD ([facility]) No Growth at 5 days            MOST RECENT IMAGING  X-Ray Chest 1 View  Narrative: EXAMINATION:  Chest one view    CLINICAL HISTORY:  Pneumonia    COMPARISON:  07/24/2023    FINDINGS:  Cardiac silhouette is enlarged.  There is persistent central vascular enlargement with increased injury markings in both lungs with superimposed ground-glass densities.  No interval consolidation.  No visible pneumothorax.  No definite pleural effusion.  Impression: Cardiac enlargement with central vascular congestion, increased interstitial markings and hazy ground-glass densities, suggesting volume overload.    Electronically signed by: Flex Kirk MD  Date:    07/29/2023  Time:    11:28      [facility]  Results for orders placed during the hospital encounter of 12/12/22    Echo Saline Bubble? No    Interpretation Summary  · Technically difficult study, No Definity contrast is used in this study.  · " Mild concentric hypertrophy and low normal systolic function.  · The estimated ejection fraction is 50%.  · Indeterminate left ventricular diastolic function.  · Mild right ventricular enlargement.  · Aortic valve sclerosis without stenosis. Peak AV velocity 1.7m/sec.  · Mild-to-moderate mitral regurgitation.  · Mild tricuspid regurgitation.      CURRENT/PREVIOUS VISIT EKG  Results for orders placed or performed during the hospital encounter of 07/24/23   EKG 12-lead    Collection Time: 07/24/23 12:39 PM    Narrative    Test Reason : R06.02,    Vent. Rate : 121 BPM     Atrial Rate : 122 BPM     P-R Int : 000 ms          QRS Dur : 118 ms      QT Int : 296 ms       P-R-T Axes : 000 -63 104 degrees     QTc Int : 420 ms    Atrial fibrillation with rapid ventricular response  Left anterior fascicular block  T wave abnormality, consider lateral ischemia  Abnormal ECG  Confirmed by Arpit Duffy MD ([id number]) on 7/24/2023 4:22:22 PM    Referred By: AGUSTÍNERR   SELF           Confirmed By:Arpit Duffy MD       ASSESSMENT/PLAN:     There are no active hospital problems to display for this patient.      ASSESSMENT & PLAN:   78-year-old  female end-stage renal disease secondary to diabetes admitted with hypovolemia doing very well at this time.  She is currently on hemodialysis tolerating procedure well with stable blood pressures      RECOMMENDATIONS:  Continue current therapy until patient be discharged.        Avila Mckinnon MD  Department of Nephrology  Date of Service: 07/31/2023  12:57 PM

## 2023-07-31 NOTE — PT/OT/SLP PROGRESS
"Physical Therapy Treatment    Patient Name:  Danae Sanchez   MRN:  [id number]    Recommendations:     Discharge Recommendations: SNF  Discharge Equipment Recommendations: none  Barriers to discharge: None    Assessment:     Danae Sanchez is a 78 y.o. female admitted with a medical diagnosis of <principal problem not specified>.  She presents with the following impairments/functional limitations:   gen. Weakness, SOB on exertion, difficulty walking.    Rehab Prognosis: Good; patient would benefit from acute skilled PT services to address these deficits and reach maximum level of function.    Recent Surgery: * No surgery found *      Plan:     Pt agrees to participate in Skilled therapy when she leaves the hospital.  During this hospitalization, patient to be seen 5 x/week to address the identified rehab impairments via therapeutic exercises, therapeutic activities, gait training and progress toward the following goals:    Plan of Care Expires:       Subjective     Chief Complaint: "I get so short of breath when I walk"  Patient/Family Comments/goals: SNF at [place]  Pain/Comfort:         Objective:     Communicated with pt prior to session.  Patient found up in chair with   upon PT entry to room.     General Precautions: Standard,    Orthopedic Precautions:    Braces:    Respiratory Status: Room air     Functional Mobility:  Transfers:     Sit to Stand:  stand by assistance with rolling walker  Gait: amb 40ft RW SBA      AM-PAC 6 CLICK MOBILITY          Treatment & Education:  TF to chair SBA  Scoots back in chair Geoff    Patient left up in chair with all lines intact and call button in reach..    GOALS:   Multidisciplinary Problems       Physical Therapy Goals          Problem: Physical Therapy    Goal Priority Disciplines Outcome Goal Variances Interventions   Physical Therapy Goal     PT, PT/OT Ongoing, Progressing     Description: 1.  Pt will be able to amb In room and halls with RW supervision  2.  Pt will tf " with HUGO SBA to chair  3.  Pt will be I HEP                       Time Tracking:     PT Received On: 07/31/23  PT Start Time: 1030     PT Stop Time: 1100  PT Total Time (min): 30 min     Billable Minutes: Gait Training 15 and Therapeutic Activity 15    Treatment Type: Treatment  PT/PTA: PT           07/31/2023

## 2023-07-31 NOTE — PT/OT/SLP PROGRESS
Physical Therapy      Patient Name:  Danae Sanchez   MRN:  [id number]    Patient not seen today at 1330 secondary to pt reports she is too tired since dialysis and will try tomorrow . Will follow-up tomorrow.

## 2023-08-01 LAB
POCT GLUCOSE: 121 MG/DL (ref 70–110)
POCT GLUCOSE: 171 MG/DL (ref 70–110)
POCT GLUCOSE: 98 MG/DL (ref 70–110)

## 2023-08-01 PROCEDURE — 21400001 HC TELEMETRY ROOM

## 2023-08-01 PROCEDURE — 25000003 PHARM REV CODE 250: Performed by: FAMILY MEDICINE

## 2023-08-01 PROCEDURE — 63700000 PHARM REV CODE 250 ALT 637 W/O HCPCS: Performed by: FAMILY MEDICINE

## 2023-08-01 PROCEDURE — 94640 AIRWAY INHALATION TREATMENT: CPT

## 2023-08-01 PROCEDURE — 25000242 PHARM REV CODE 250 ALT 637 W/ HCPCS: Performed by: FAMILY MEDICINE

## 2023-08-01 PROCEDURE — 97530 THERAPEUTIC ACTIVITIES: CPT | Mod: CQ

## 2023-08-01 PROCEDURE — 94761 N-INVAS EAR/PLS OXIMETRY MLT: CPT

## 2023-08-01 PROCEDURE — 99900035 HC TECH TIME PER 15 MIN (STAT)

## 2023-08-01 PROCEDURE — 63600175 PHARM REV CODE 636 W HCPCS: Performed by: FAMILY MEDICINE

## 2023-08-01 PROCEDURE — 27000221 HC OXYGEN, UP TO 24 HOURS

## 2023-08-01 PROCEDURE — 97116 GAIT TRAINING THERAPY: CPT | Mod: CQ

## 2023-08-01 RX ORDER — MUPIROCIN 20 MG/G
OINTMENT TOPICAL DAILY
Status: DISCONTINUED | OUTPATIENT
Start: 2023-08-01 | End: 2023-08-02 | Stop reason: HOSPADM

## 2023-08-01 RX ORDER — ROPINIROLE 0.25 MG/1
0.5 TABLET, FILM COATED ORAL 3 TIMES DAILY
Status: DISCONTINUED | OUTPATIENT
Start: 2023-08-01 | End: 2023-08-02 | Stop reason: HOSPADM

## 2023-08-01 RX ADMIN — METOPROLOL SUCCINATE 50 MG: 50 TABLET, EXTENDED RELEASE ORAL at 09:08

## 2023-08-01 RX ADMIN — INSULIN ASPART 2 UNITS: 100 INJECTION, SOLUTION INTRAVENOUS; SUBCUTANEOUS at 12:08

## 2023-08-01 RX ADMIN — BUDESONIDE INHALATION 0.5 MG: 0.5 SUSPENSION RESPIRATORY (INHALATION) at 07:08

## 2023-08-01 RX ADMIN — IPRATROPIUM BROMIDE AND ALBUTEROL SULFATE 3 ML: 2.5; .5 SOLUTION RESPIRATORY (INHALATION) at 07:08

## 2023-08-01 RX ADMIN — IPRATROPIUM BROMIDE AND ALBUTEROL SULFATE 3 ML: 2.5; .5 SOLUTION RESPIRATORY (INHALATION) at 01:08

## 2023-08-01 RX ADMIN — IPRATROPIUM BROMIDE AND ALBUTEROL SULFATE 3 ML: 2.5; .5 SOLUTION RESPIRATORY (INHALATION) at 03:08

## 2023-08-01 RX ADMIN — GABAPENTIN 100 MG: 100 CAPSULE ORAL at 09:08

## 2023-08-01 RX ADMIN — PANTOPRAZOLE SODIUM 40 MG: 40 TABLET, DELAYED RELEASE ORAL at 09:08

## 2023-08-01 RX ADMIN — ATORVASTATIN CALCIUM 40 MG: 40 TABLET, FILM COATED ORAL at 05:08

## 2023-08-01 RX ADMIN — APIXABAN 2.5 MG: 2.5 TABLET, FILM COATED ORAL at 09:08

## 2023-08-01 RX ADMIN — FLUOXETINE 40 MG: 10 CAPSULE ORAL at 09:08

## 2023-08-01 RX ADMIN — MUPIROCIN 1 G: 20 OINTMENT TOPICAL at 03:08

## 2023-08-01 RX ADMIN — IPRATROPIUM BROMIDE AND ALBUTEROL SULFATE 3 ML: 2.5; .5 SOLUTION RESPIRATORY (INHALATION) at 11:08

## 2023-08-01 RX ADMIN — LEVOTHYROXINE SODIUM 200 MCG: 100 TABLET ORAL at 09:08

## 2023-08-01 RX ADMIN — ROPINIROLE HYDROCHLORIDE 0.5 MG: 0.25 TABLET, FILM COATED ORAL at 03:08

## 2023-08-01 RX ADMIN — AZITHROMYCIN MONOHYDRATE 500 MG: 500 INJECTION, POWDER, LYOPHILIZED, FOR SOLUTION INTRAVENOUS at 09:08

## 2023-08-01 RX ADMIN — ZOLPIDEM TARTRATE 5 MG: 5 TABLET ORAL at 09:08

## 2023-08-01 RX ADMIN — ROPINIROLE HYDROCHLORIDE 0.5 MG: 0.25 TABLET, FILM COATED ORAL at 09:08

## 2023-08-01 RX ADMIN — CEFTRIAXONE SODIUM 1 G: 1 INJECTION, POWDER, FOR SOLUTION INTRAMUSCULAR; INTRAVENOUS at 09:08

## 2023-08-01 RX ADMIN — ACETAMINOPHEN 500 MG: 500 TABLET, FILM COATED ORAL at 09:08

## 2023-08-01 RX ADMIN — FLUCONAZOLE 100 MG: 100 TABLET ORAL at 09:08

## 2023-08-01 NOTE — PT/OT/SLP PROGRESS
"Physical Therapy Treatment    Patient Name:  Danae Sanchez   MRN:  [id number]    Recommendations:     Discharge Recommendations: SNF  Discharge Equipment Recommendations: none  Barriers to discharge: None    Assessment:     Danae Sanchez is a 78 y.o. female admitted with a medical diagnosis of <principal problem not specified>.  She presents with the following impairments/functional limitations:   gen. Weakness, SOB on exertion, difficulty walking.    Rehab Prognosis: Good; patient would benefit from acute skilled PT services to address these deficits and reach maximum level of function.    Recent Surgery: * No surgery found *      Plan:     Pt agrees to participate in Skilled therapy when she leaves the hospital.  During this hospitalization, patient to be seen 5 x/week to address the identified rehab impairments via therapeutic exercises, therapeutic activities, gait training and progress toward the following goals:    Plan of Care Expires:       Subjective     Chief Complaint: "I get so short of breath when I walk"  Patient/Family Comments/goals: SNF at [place]  Pain/Comfort:         Objective:     Communicated with pt prior to session.  Patient found up in chair with   upon PT entry to room.     General Precautions: Standard,    Orthopedic Precautions:    Braces:    Respiratory Status: Nasal cannula, flow 2 L/min     Functional Mobility:  Transfers:     Sit to Stand:  stand by assistance with rolling walker  Gait: 65' with RW and SBA  Balance: SBA in supported standing      AM-PAC 6 CLICK MOBILITY          Treatment & Education:  See above, reviewed BLE exercises with demonstration    Patient left up in chair with all lines intact and call button in reach..    GOALS:   Multidisciplinary Problems       Physical Therapy Goals          Problem: Physical Therapy    Goal Priority Disciplines Outcome Goal Variances Interventions   Physical Therapy Goal     PT, PT/OT Ongoing, Progressing     Description: 1.  Pt will be " able to amb In room and halls with RW supervision  2.  Pt will tf with RW SBA to chair  3.  Pt will be I HEP                       Time Tracking:     PT Received On:  8/1/23  PT Start Time:  1305   PT Stop Time:  1320  PT Total Time (min):   15 min    Billable Minutes: Gait Training 15                   08/01/2023

## 2023-08-01 NOTE — PROGRESS NOTES
[facility]                     [address] [address]    PATIENT NAME:       DANAE SANCHEZ  YOB: 1945  CSN:                [id number]   MRN:                [id number]  ADMIT DATE:         07/24/2023 12:25:00  PHYSICIAN:          Ludin Velasco MD                            PROGRESS NOTE    DATE:  07/31/2023 00:00:00    SUBJECTIVE:  Ms. Danae Sanchez remains in the hospital secondary to multiple   medical problems, most significant of which is her pneumonia with volume   overload.  She is on dialysis, and they are dialyzing again.  Since admit, they   have taken off approximately 16 liters of fluid.  Her blood pressure, therefore,   has been well, but she has been asymptomatic.  We stopped her Cardizem, but she   does continue to receive her metoprolol.  She is receiving breathing   treatments, and she is on oxygen per nasal cannula.    PHYSICAL EXAMINATION:  VITAL SIGNS:  Stable.  She is afebrile.  Pulse oximetry is satisfactory on 2   liters of oxygen per nasal cannula, and we are weaning.  HEART:  Regular rate and rhythm.  LUNGS: Have minimal expiratory wheezes, minimal rhonchi, but no respiratory   distress.  She seems very at ease with her breathing.  ABDOMEN:  Soft, nontender, and obese.  EXTREMITIES:  Trace lower extremity edema bilaterally.    LABS / X-RAYS / MEDICATIONS / CONSULTATIONS:  On the chart and reviewed.    ASSESSMENT:    1. Volume overload secondary to chronic renal insufficiency-end-stage, on   dialysis.  2. Pneumonia.  3. Underlying diabetes.  4. Obesity.  5. Multiple other medical problems underlying.    PLAN:  Continue current care and discharge back to the nursing home when stable.    ______________________________  Ludin Velasco MD    PBS/AQS  DD:  07/31/2023  Time:  08:01PM  DT:  07/31/2023  Time:  09:11PM  Job #:  [id number]      PROGRESS NOTE

## 2023-08-01 NOTE — PROGRESS NOTES
Inpatient Nutrition Evaluation    Admit Date: 7/24/2023   Total duration of encounter: 8 days    Nutrition Recommendation/Prescription     Continue current diet as tolerated.   Daily weights.   Monitor intake, weight, and labs.     RD available as needed. Thank you.     Nutrition Assessment     Chart Review    Reason Seen: continuous nutrition monitoring and follow-up    Malnutrition Screening Tool Results   Have you recently lost weight without trying?: No  Have you been eating poorly because of a decreased appetite?: No   MST Score: 0     Diagnosis:  [R06.02] SOB (shortness of breath)  [I48.91] Atrial fibrillation with rapid ventricular response (Primary)  [J18.9] Pneumonia of right upper lobe due to infectious organism  [N18.6] ESRD (end stage renal disease)    Relevant Medical History:   Anxiety disorder, unspecified      Chronic kidney disease, unspecified      Congestive heart failure (CHF)      Depression      Diabetes mellitus      GERD (gastroesophageal reflux disease)      History of anemia due to chronic kidney disease      Hypercholesterolemia      Hypertension      Hypothyroidism, unspecified      Parkinsonism      Paroxysmal atrial fibrillation          Nutrition-Related Medications:   Insulin; Levothyroxine; Pantoprazole EC.     Nutrition-Related Labs:  8/1: No new labs.   7/25: No new labs.   7/24:Crea 2.54(H); GFR 19(L); (H); Hgb 11.2(L).     Diet Order: DIET RENAL ON DIALYSIS Low Sodium, Low Cholesterol, Diabetic  Oral Supplement Order: none  Appetite/Oral Intake: good/% of meals  Factors Affecting Nutritional Intake: none identified  Food/Orthodox/Cultural Preferences: none reported  Food Allergies: none reported    Skin Integrity: other (see comments) (BLE redness)  Wound(s):       Comments    8/1: Pt with good appetite and intake. Consuming 75% of meals so far today. No new labs. ~ 3 lb weight loss noted from 7/28. Receiving HD.  Likely fluid loss. Will continue to monitor during  "stay.     7/25: Pt consuming 50% of meals so far today. No recent weight loss noted/reported. Weight stable from admit. Will continue to monitor during stay.     Anthropometrics    Height: 5' 7" (170.2 cm) Height Method: Stated  Last Weight: (!) 137 kg (302 lb 0.5 oz) (08/01/23 0442) Weight Method: Bed Scale  BMI (Calculated): 47.3  BMI Classification: obese grade III (BMI >/=40)     Ideal Body Weight (IBW), Female: 135 lb     % Ideal Body Weight, Female (lb): 226.67 %                             Usual Weight Provided By: unable to obtain usual weight    Wt Readings from Last 5 Encounters:   08/01/23 (!) 137 kg (302 lb 0.5 oz)   06/21/23 (!) 138.3 kg (305 lb)   05/01/23 (!) 145.2 kg (320 lb)   09/01/22 135.4 kg (298 lb 8 oz)   06/25/18 (!) 151 kg (332 lb 14.3 oz)     Weight Change(s) Since Admission:  Admit Weight: (!) 138.8 kg (306 lb) (07/24/23 1221)  8/1: 137 kg. Noted ~ 3 lb weight loss since 7/28. Likely fluid.     Patient Education    Not applicable.    Monitoring & Evaluation     Dietitian will monitor food and beverage intake, energy intake, weight, weight change, electrolyte/renal panel, glucose/endocrine profile, and gastrointestinal profile.  Nutrition Risk/Follow-Up: low (follow-up in 5-7 days)  Patients assigned 'low nutrition risk' status do not qualify for a full nutritional assessment but will be monitored and re-evaluated in a 5-7 day time period. Please consult if re-evaluation needed sooner.   "

## 2023-08-02 VITALS
HEIGHT: 67 IN | WEIGHT: 293 LBS | BODY MASS INDEX: 45.99 KG/M2 | TEMPERATURE: 98 F | OXYGEN SATURATION: 100 % | HEART RATE: 117 BPM | SYSTOLIC BLOOD PRESSURE: 96 MMHG | RESPIRATION RATE: 20 BRPM | DIASTOLIC BLOOD PRESSURE: 61 MMHG

## 2023-08-02 PROBLEM — E87.70 VOLUME OVERLOAD: Status: ACTIVE | Noted: 2023-08-02

## 2023-08-02 LAB
ALBUMIN SERPL-MCNC: 3.1 G/DL (ref 3.4–4.8)
ALBUMIN/GLOB SERPL: 1.1 RATIO (ref 1.1–2)
ALP SERPL-CCNC: 166 UNIT/L (ref 40–150)
ALT SERPL-CCNC: 24 UNIT/L (ref 0–55)
AST SERPL-CCNC: 21 UNIT/L (ref 5–34)
BASOPHILS # BLD AUTO: 0.1 X10(3)/MCL
BASOPHILS NFR BLD AUTO: 1.3 %
BILIRUBIN DIRECT+TOT PNL SERPL-MCNC: 0.4 MG/DL
BUN SERPL-MCNC: 48 MG/DL (ref 9.8–20.1)
CALCIUM SERPL-MCNC: 7.8 MG/DL (ref 8.4–10.2)
CHLORIDE SERPL-SCNC: 100 MMOL/L (ref 98–107)
CO2 SERPL-SCNC: 25 MMOL/L (ref 23–31)
CREAT SERPL-MCNC: 5.8 MG/DL (ref 0.55–1.02)
EOSINOPHIL # BLD AUTO: 0.38 X10(3)/MCL (ref 0–0.9)
EOSINOPHIL NFR BLD AUTO: 4.8 %
ERYTHROCYTE [DISTWIDTH] IN BLOOD BY AUTOMATED COUNT: 15.4 % (ref 11.5–17)
GFR SERPLBLD CREATININE-BSD FMLA CKD-EPI: 7 MLS/MIN/1.73/M2
GLOBULIN SER-MCNC: 2.8 GM/DL (ref 2.4–3.5)
GLUCOSE SERPL-MCNC: 108 MG/DL (ref 82–115)
HCT VFR BLD AUTO: 35.6 % (ref 37–47)
HGB BLD-MCNC: 10.8 G/DL (ref 12–16)
IMM GRANULOCYTES # BLD AUTO: 0.04 X10(3)/MCL (ref 0–0.04)
IMM GRANULOCYTES NFR BLD AUTO: 0.5 %
LYMPHOCYTES # BLD AUTO: 1.42 X10(3)/MCL (ref 0.6–4.6)
LYMPHOCYTES NFR BLD AUTO: 18.1 %
MAGNESIUM SERPL-MCNC: 1.8 MG/DL (ref 1.6–2.6)
MCH RBC QN AUTO: 30.4 PG (ref 27–31)
MCHC RBC AUTO-ENTMCNC: 30.3 G/DL (ref 33–36)
MCV RBC AUTO: 100.3 FL (ref 80–94)
MONOCYTES # BLD AUTO: 0.77 X10(3)/MCL (ref 0.1–1.3)
MONOCYTES NFR BLD AUTO: 9.8 %
NEUTROPHILS # BLD AUTO: 5.14 X10(3)/MCL (ref 2.1–9.2)
NEUTROPHILS NFR BLD AUTO: 65.5 %
PHOSPHATE SERPL-MCNC: 5.1 MG/DL (ref 2.3–4.7)
PLATELET # BLD AUTO: 219 X10(3)/MCL (ref 130–400)
PMV BLD AUTO: 10.2 FL (ref 7.4–10.4)
POCT GLUCOSE: 145 MG/DL (ref 70–110)
POCT GLUCOSE: 179 MG/DL (ref 70–110)
POTASSIUM SERPL-SCNC: 4.1 MMOL/L (ref 3.5–5.1)
PROT SERPL-MCNC: 5.9 GM/DL (ref 5.8–7.6)
RBC # BLD AUTO: 3.55 X10(6)/MCL (ref 4.2–5.4)
SARS-COV-2 RDRP RESP QL NAA+PROBE: NEGATIVE
SODIUM SERPL-SCNC: 140 MMOL/L (ref 136–145)
WBC # SPEC AUTO: 7.85 X10(3)/MCL (ref 4.5–11.5)

## 2023-08-02 PROCEDURE — 80100016 HC MAINTENANCE HEMODIALYSIS

## 2023-08-02 PROCEDURE — 94761 N-INVAS EAR/PLS OXIMETRY MLT: CPT

## 2023-08-02 PROCEDURE — 25000242 PHARM REV CODE 250 ALT 637 W/ HCPCS: Performed by: FAMILY MEDICINE

## 2023-08-02 PROCEDURE — 84100 ASSAY OF PHOSPHORUS: CPT | Performed by: FAMILY MEDICINE

## 2023-08-02 PROCEDURE — 87635 SARS-COV-2 COVID-19 AMP PRB: CPT | Performed by: FAMILY MEDICINE

## 2023-08-02 PROCEDURE — 80053 COMPREHEN METABOLIC PANEL: CPT | Performed by: FAMILY MEDICINE

## 2023-08-02 PROCEDURE — 85025 COMPLETE CBC W/AUTO DIFF WBC: CPT | Performed by: FAMILY MEDICINE

## 2023-08-02 PROCEDURE — 63600175 PHARM REV CODE 636 W HCPCS: Performed by: FAMILY MEDICINE

## 2023-08-02 PROCEDURE — 99900035 HC TECH TIME PER 15 MIN (STAT)

## 2023-08-02 PROCEDURE — 63700000 PHARM REV CODE 250 ALT 637 W/O HCPCS: Performed by: FAMILY MEDICINE

## 2023-08-02 PROCEDURE — 27000221 HC OXYGEN, UP TO 24 HOURS

## 2023-08-02 PROCEDURE — 94640 AIRWAY INHALATION TREATMENT: CPT

## 2023-08-02 PROCEDURE — 25000003 PHARM REV CODE 250: Performed by: FAMILY MEDICINE

## 2023-08-02 PROCEDURE — 83735 ASSAY OF MAGNESIUM: CPT | Performed by: FAMILY MEDICINE

## 2023-08-02 RX ORDER — ROPINIROLE 0.5 MG/1
0.5 TABLET, FILM COATED ORAL 3 TIMES DAILY
Qty: 90 TABLET | Refills: 11 | Status: SHIPPED | OUTPATIENT
Start: 2023-08-02 | End: 2023-08-21

## 2023-08-02 RX ADMIN — GABAPENTIN 100 MG: 100 CAPSULE ORAL at 11:08

## 2023-08-02 RX ADMIN — APIXABAN 2.5 MG: 2.5 TABLET, FILM COATED ORAL at 11:08

## 2023-08-02 RX ADMIN — CEFTRIAXONE SODIUM 1 G: 1 INJECTION, POWDER, FOR SOLUTION INTRAMUSCULAR; INTRAVENOUS at 11:08

## 2023-08-02 RX ADMIN — IPRATROPIUM BROMIDE AND ALBUTEROL SULFATE 3 ML: 2.5; .5 SOLUTION RESPIRATORY (INHALATION) at 03:08

## 2023-08-02 RX ADMIN — IPRATROPIUM BROMIDE AND ALBUTEROL SULFATE 3 ML: 2.5; .5 SOLUTION RESPIRATORY (INHALATION) at 07:08

## 2023-08-02 RX ADMIN — FLUCONAZOLE 100 MG: 100 TABLET ORAL at 11:08

## 2023-08-02 RX ADMIN — FLUOXETINE 40 MG: 10 CAPSULE ORAL at 11:08

## 2023-08-02 RX ADMIN — LEVOTHYROXINE SODIUM 200 MCG: 100 TABLET ORAL at 11:08

## 2023-08-02 RX ADMIN — MUPIROCIN 1 G: 20 OINTMENT TOPICAL at 12:08

## 2023-08-02 RX ADMIN — BUDESONIDE INHALATION 0.5 MG: 0.5 SUSPENSION RESPIRATORY (INHALATION) at 07:08

## 2023-08-02 RX ADMIN — PANTOPRAZOLE SODIUM 40 MG: 40 TABLET, DELAYED RELEASE ORAL at 11:08

## 2023-08-02 RX ADMIN — METOPROLOL SUCCINATE 50 MG: 50 TABLET, EXTENDED RELEASE ORAL at 11:08

## 2023-08-02 RX ADMIN — ROPINIROLE HYDROCHLORIDE 0.5 MG: 0.25 TABLET, FILM COATED ORAL at 11:08

## 2023-08-02 NOTE — PLAN OF CARE
DC orders and AVS loaded in [facility] for return to [facility] today. COVID swab obtained and pending. Will notify nurse to call report to Marylu once pt is ready to DC.

## 2023-08-02 NOTE — PLAN OF CARE
Problem: Adult Inpatient Plan of Care  Goal: Plan of Care Review  Outcome: Ongoing, Progressing  Goal: Patient-Specific Goal (Individualized)  Outcome: Ongoing, Progressing  Goal: Absence of Hospital-Acquired Illness or Injury  Outcome: Ongoing, Progressing  Goal: Optimal Comfort and Wellbeing  Outcome: Ongoing, Progressing  Goal: Readiness for Transition of Care  Outcome: Ongoing, Progressing     Problem: Bariatric Environmental Safety  Goal: Safety Maintained with Care  Outcome: Ongoing, Progressing     Problem: Skin Injury Risk Increased  Goal: Skin Health and Integrity  Outcome: Ongoing, Progressing     Problem: Adjustment to Illness (Chronic Kidney Disease)  Goal: Optimal Coping with Chronic Illness  Outcome: Ongoing, Progressing     Problem: Electrolyte Imbalance (Chronic Kidney Disease)  Goal: Electrolyte Balance  Outcome: Ongoing, Progressing     Problem: Fluid Volume Excess (Chronic Kidney Disease)  Goal: Fluid Balance  Outcome: Ongoing, Progressing     Problem: Functional Decline (Chronic Kidney Disease)  Goal: Optimal Functional Ability  Outcome: Ongoing, Progressing     Problem: Hematologic Alteration (Chronic Kidney Disease)  Goal: Absence of Anemia Signs and Symptoms  Outcome: Ongoing, Progressing     Problem: Oral Intake Inadequate (Chronic Kidney Disease)  Goal: Optimal Oral Intake  Outcome: Ongoing, Progressing     Problem: Pain (Chronic Kidney Disease)  Goal: Acceptable Pain Control  Outcome: Ongoing, Progressing     Problem: Renal Function Impairment (Chronic Kidney Disease)  Goal: Minimize Renal Failure Effects  Outcome: Ongoing, Progressing     Problem: Device-Related Complication Risk (Hemodialysis)  Goal: Safe, Effective Therapy Delivery  Outcome: Ongoing, Progressing     Problem: Hemodynamic Instability (Hemodialysis)  Goal: Effective Tissue Perfusion  Outcome: Ongoing, Progressing     Problem: Infection (Hemodialysis)  Goal: Absence of Infection Signs and Symptoms  Outcome: Ongoing,  Progressing     Problem: Gas Exchange Impaired  Goal: Optimal Gas Exchange  Outcome: Ongoing, Progressing

## 2023-08-10 ENCOUNTER — OFFICE VISIT (OUTPATIENT)
Dept: SURGICAL ONCOLOGY | Facility: CLINIC | Age: 78
End: 2023-08-10
Payer: MEDICARE

## 2023-08-10 VITALS
HEART RATE: 100 BPM | WEIGHT: 293 LBS | SYSTOLIC BLOOD PRESSURE: 115 MMHG | BODY MASS INDEX: 45.99 KG/M2 | HEIGHT: 67 IN | DIASTOLIC BLOOD PRESSURE: 75 MMHG

## 2023-08-10 DIAGNOSIS — R15.9 INCONTINENCE OF FECES, UNSPECIFIED FECAL INCONTINENCE TYPE: Primary | ICD-10-CM

## 2023-08-10 DIAGNOSIS — K60.3 ANAL FISTULA: ICD-10-CM

## 2023-08-10 PROCEDURE — 3288F PR FALLS RISK ASSESSMENT DOCUMENTED: ICD-10-PCS | Mod: CPTII,S$GLB,, | Performed by: COLON & RECTAL SURGERY

## 2023-08-10 PROCEDURE — 1160F PR REVIEW ALL MEDS BY PRESCRIBER/CLIN PHARMACIST DOCUMENTED: ICD-10-PCS | Mod: CPTII,S$GLB,, | Performed by: COLON & RECTAL SURGERY

## 2023-08-10 PROCEDURE — 3074F SYST BP LT 130 MM HG: CPT | Mod: CPTII,S$GLB,, | Performed by: COLON & RECTAL SURGERY

## 2023-08-10 PROCEDURE — 1111F PR DISCHARGE MEDS RECONCILED W/ CURRENT OUTPATIENT MED LIST: ICD-10-PCS | Mod: CPTII,S$GLB,, | Performed by: COLON & RECTAL SURGERY

## 2023-08-10 PROCEDURE — 1111F DSCHRG MED/CURRENT MED MERGE: CPT | Mod: CPTII,S$GLB,, | Performed by: COLON & RECTAL SURGERY

## 2023-08-10 PROCEDURE — 3074F PR MOST RECENT SYSTOLIC BLOOD PRESSURE < 130 MM HG: ICD-10-PCS | Mod: CPTII,S$GLB,, | Performed by: COLON & RECTAL SURGERY

## 2023-08-10 PROCEDURE — 1159F MED LIST DOCD IN RCRD: CPT | Mod: CPTII,S$GLB,, | Performed by: COLON & RECTAL SURGERY

## 2023-08-10 PROCEDURE — 1160F RVW MEDS BY RX/DR IN RCRD: CPT | Mod: CPTII,S$GLB,, | Performed by: COLON & RECTAL SURGERY

## 2023-08-10 PROCEDURE — 1101F PR PT FALLS ASSESS DOC 0-1 FALLS W/OUT INJ PAST YR: ICD-10-PCS | Mod: CPTII,S$GLB,, | Performed by: COLON & RECTAL SURGERY

## 2023-08-10 PROCEDURE — 1101F PT FALLS ASSESS-DOCD LE1/YR: CPT | Mod: CPTII,S$GLB,, | Performed by: COLON & RECTAL SURGERY

## 2023-08-10 PROCEDURE — 3078F DIAST BP <80 MM HG: CPT | Mod: CPTII,S$GLB,, | Performed by: COLON & RECTAL SURGERY

## 2023-08-10 PROCEDURE — 1159F PR MEDICATION LIST DOCUMENTED IN MEDICAL RECORD: ICD-10-PCS | Mod: CPTII,S$GLB,, | Performed by: COLON & RECTAL SURGERY

## 2023-08-10 PROCEDURE — 1126F PR PAIN SEVERITY QUANTIFIED, NO PAIN PRESENT: ICD-10-PCS | Mod: CPTII,S$GLB,, | Performed by: COLON & RECTAL SURGERY

## 2023-08-10 PROCEDURE — 99999 PR PBB SHADOW E&M-EST. PATIENT-LVL III: CPT | Mod: PBBFAC,,, | Performed by: COLON & RECTAL SURGERY

## 2023-08-10 PROCEDURE — 99999 PR PBB SHADOW E&M-EST. PATIENT-LVL III: ICD-10-PCS | Mod: PBBFAC,,, | Performed by: COLON & RECTAL SURGERY

## 2023-08-10 PROCEDURE — 1126F AMNT PAIN NOTED NONE PRSNT: CPT | Mod: CPTII,S$GLB,, | Performed by: COLON & RECTAL SURGERY

## 2023-08-10 PROCEDURE — 99204 PR OFFICE/OUTPT VISIT, NEW, LEVL IV, 45-59 MIN: ICD-10-PCS | Mod: S$GLB,,, | Performed by: COLON & RECTAL SURGERY

## 2023-08-10 PROCEDURE — 3078F PR MOST RECENT DIASTOLIC BLOOD PRESSURE < 80 MM HG: ICD-10-PCS | Mod: CPTII,S$GLB,, | Performed by: COLON & RECTAL SURGERY

## 2023-08-10 PROCEDURE — 99204 OFFICE O/P NEW MOD 45 MIN: CPT | Mod: S$GLB,,, | Performed by: COLON & RECTAL SURGERY

## 2023-08-10 PROCEDURE — 3288F FALL RISK ASSESSMENT DOCD: CPT | Mod: CPTII,S$GLB,, | Performed by: COLON & RECTAL SURGERY

## 2023-08-10 RX ORDER — SODIUM FLUORIDE 5 MG/G
PASTE, DENTIFRICE DENTAL
COMMUNITY

## 2023-08-10 RX ORDER — MAGNESIUM OXIDE 200 MG
TABLET,CHEWABLE ORAL
Status: ON HOLD | COMMUNITY
End: 2023-08-22 | Stop reason: HOSPADM

## 2023-08-10 RX ORDER — ACETAMINOPHEN 500MG/15ML
LIQUID (ML) ORAL
Status: ON HOLD | COMMUNITY
Start: 2023-02-23 | End: 2023-08-21

## 2023-08-10 RX ORDER — LORATADINE 10 MG
TABLET,CHEWABLE ORAL
Status: ON HOLD | COMMUNITY
End: 2023-08-22 | Stop reason: HOSPADM

## 2023-08-10 NOTE — PROGRESS NOTES
Patient ID: [id number]     Chief Complaint: Anal Fissure (NP)      HPI:     Danae Sanchez is a 78 y.o. female here today for an initial consultation.  Patient with multiple comorbidities including morbid obesity, atrial fibrillation, end-stage renal disease on hemodialysis, CHF, and lymphoma was referred by Dr. Shahbaz George for evaluation of an anorectal fistula.  She apparently has had multiple episodes of recurrent drainage and infection in the perianal area.  She describes sudden onset of swelling and pain that often requires I&D.  Her most recent episode was 06/21/2023.  She also describes leakage of stool.    Past Medical History:  has a past medical history of Anxiety disorder, unspecified, Chronic kidney disease, unspecified, Congestive heart failure (CHF), Depression, Diabetes mellitus, GERD (gastroesophageal reflux disease), History of anemia due to chronic kidney disease, Hypercholesterolemia, Hypertension, Hypothyroidism, unspecified, Parkinsonism, and Paroxysmal atrial fibrillation.    Surgical History:  has a past surgical history that includes Cholecystectomy; Hysterectomy; and Dialysis shunt (Left).    Family History: family history includes Heart disease in her father, mother, and sister; Hypertension in her father, mother, and sister.    Social History:  reports that she has never smoked. She has been exposed to tobacco smoke. She has never used smokeless tobacco. She reports that she does not currently use alcohol. She reports that she does not use drugs.    Current Outpatient Medications   Medication Instructions    acetaminophen (TYLENOL) 500 mg, Oral, Every 6 hours PRN    acetaminophen 500 mg/15 mL Liqd 1 tablet as needed Orally every 6 hours for 30 days    albuterol-ipratropium (DUO-NEB) 2.5 mg-0.5 mg/3 mL nebulizer solution 3 mLs, Nebulization, Every 6 hours while awake, Rescue    atorvastatin (LIPITOR) 40 mg, Oral, Daily    budesonide (PULMICORT) 0.5 mg, Nebulization, Every 12 hours,  Controller    ELIQUIS 2.5 mg, Oral, 2 times daily    fluoride, sodium, (PREVIDENT 5000 PLUS) 1.1 % Crea PreviDent 5000 Plus    FLUoxetine 40 mg, Oral, Daily    fluticasone propionate (FLONASE) 100 mcg, Each Nostril, Daily    gabapentin (NEURONTIN) 100 mg, Oral, 2 times daily    glycerin/min oil/polycarbophil (REPLENS VAGL) 1 Application    HUMULIN 70/30 U-100 INSULIN 100 unit/mL (70-30) injection 15 Units, Subcutaneous, 2 times daily    insulin regular human (HUMULIN R) 100 unit/mL (3 mL) InPn pen inject 4-10 units into the skin 2 times daily before meals. 200-250 4 units, 251-300: 6 units, 301-350: 8 units, 351-400: 10 units, 401> notify MD- Subcutaneous Injection    insulin regular 4-10 Units, Subcutaneous, 2 times daily before meals, 200-250: 4 units 251-300: 6 units 301-350: 8 units 351-400: 10 units 401 > notify MD    Lactobacillus acidophilus 500 million cell Cap 1 capsule, Oral, Daily    Lactoperoxi/Gluc Oxid/Pot Thio (BIOTENE DRY MOUTH MM) Biotene Dry Mouth    levothyroxine (SYNTHROID) 200 mcg, Oral, Daily    loratadine (CLARITIN) 10 mg Chew 1 tablet Orally Once a day    loratadine (CLARITIN) 10 mg, Oral    magnesium oxide 200 mg magnesium Chew 1 tablet Orally every day    metOLazone (ZAROXOLYN) 5 mg, Oral, Daily    metoprolol succinate (TOPROL-XL) 50 mg, Oral, 2 times daily    multivitamin with folic acid 400 mcg Tab 1 tablet, Oral, Daily    pantoprazole (PROTONIX) 40 mg, Oral, Daily    PREVIDENT 5000 PLUS 1.1 % Crea No dose, route, or frequency recorded.    rOPINIRole (REQUIP) 0.5 mg, Oral, 3 times daily    torsemide (DEMADEX) 20 mg, Oral, Daily    traMADoL (ULTRAM) 50 mg, Oral, Every 6 hours PRN    zaleplon (SONATA) 10 mg, Oral, Nightly PRN       Patient is allergic to codeine, cortisone, lisinopril, and morphine.     Patient Care Team:  Ludin Velasco MD as PCP - General (Family Medicine)       Subjective:     Review of Systems    12 point review of systems conducted, negative except as stated in  "the history of present illness. See HPI for details.      Objective:     Visit Vitals  /75   Pulse 100   Ht 5' 7" (1.702 m)   Wt (!) 136.7 kg (301 lb 6.4 oz)   LMP  (LMP Unknown)   BMI 47.21 kg/m²       Physical Exam    General: Alert and oriented, No acute distress.  Head: Normocephalic, Atraumatic.  Eye: Pupils are equal and round, Extraocular movements are intact, Sclera non-icteric.  Ears/Nose/Throat: Normal, Mucosa moist, Clear.  Respiratory: No wheezes, Non-labored respirations, Symmetrical chest wall expansion.  Cardiovascular: Regular rate.  Gastrointestinal: Obese, Soft, Non-tender, Non-distended, Normal bowel sounds, No palpable masses.  Rectal: No perianal opening seen. No abscess or fistula appreciated. Decreased tone with enlarged hemorrhoids.  Integumentary: Warm, Dry, Intact, No rashes.  Extremities: No edema.  Neurologic: No focal deficits.  Psychiatric: Normal interaction, Coherent speech, Euthymic mood, Appropriate affect.       Labs Reviewed:     Chemistry:  Lab Results   Component Value Date     08/02/2023    K 4.1 08/02/2023    CHLORIDE 100 08/02/2023    BUN 48.0 (H) 08/02/2023    CREATININE 5.80 (H) 08/02/2023    EGFRNORACEVR 7 08/02/2023    GLUCOSE 108 08/02/2023    CALCIUM 7.8 (L) 08/02/2023    ALKPHOS 166 (H) 08/02/2023    LABPROT 5.9 08/02/2023    ALBUMIN 3.1 (L) 08/02/2023    BILIDIR 0.7 (H) 03/22/2021    IBILI 0.70 03/22/2021    AST 21 08/02/2023    ALT 24 08/02/2023    MG 1.80 08/02/2023    PHOS 5.1 (H) 08/02/2023        Hematology:  Lab Results   Component Value Date    WBC 7.85 08/02/2023    HGB 10.8 (L) 08/02/2023    HCT 35.6 (L) 08/02/2023     08/02/2023         Assessment:       ICD-10-CM ICD-9-CM   1. Incontinence of feces, unspecified fecal incontinence type  R15.9 787.60   2. Anal fistula  K60.3 565.1        Plan:   1. Incontinence of feces, unspecified fecal incontinence type  She states she is unable to take a fiber supplement because her daily water intake " is limited because she is on dialysis.  So I recommend holding stool softeners and using a glycerin suppository 2 hours before bedtime and before hemodialysis.    2. Anal fistula  I do not appreciate a fistula on exam today.  Furthermore, I explained that fistulotomy would only worsen her incontinence.      No follow-ups on file. In addition to their scheduled follow up, the patient has also been instructed to follow up on as needed basis.     No future appointments.     Eyal Burciaga MD

## 2023-08-12 NOTE — DISCHARGE SUMMARY
[facility]                     [address] [facility] [facility] [address]    PATIENT NAME:       AVELINO ARCHIBALD  YOB: 1945  CSN:                [id number]   MRN:                [id number]  ADMIT DATE:         07/24/2023 12:25:00  PHYSICIAN:          Ludin Velasco MD                          DISCHARGE SUMMARY    DATE OF DISCHARGE:  08/02/2023 15:30:00    HOSPITAL COURSE:  She was admitted to [facility] on 07/24/2023 and   discharged on 08/02/2023.  She was very short of breath on admit with a very   significant amount of wheezing.  She could not get a deep breath.  She was   placed on O2 and diagnosed with a pneumonia.  She does have underlying COPD and   restrictive deficit secondary to her morbid obesity.  She also has chronic renal   insufficiency, end-stage, and required dialysis.  She had a large amount of   fluid overload.  During hospitalization, we got off more than 15 L of fluid.    She also has history of ACM underlying all of this with chronic congestive heart   failure and acute worsening on admit.  She has depression and anxiety and is   greatly debilitated for those reasons listed above.  She was placed on   antibiotics and breathing treatments.  Her wheezing gradually improved.  She did   have some tachycardia during hospitalization associated with her atrial   fibrillation.  Medications were adjusted.  She is also a diabetic.  We watched   her blood sugars closely and medications were adjusted.  She also has underlying   anemia, but did not require blood transfusion.  Her admit white blood cell   count was normal and remained normal throughout hospitalization.  Her H and H   were 11.2 and 37.7 on admit.  By the time of discharge, it had decreased to 10.8   and 35.6 with macrocytosis.  Platelets were stable.  Her sodium on admit was   normal at 143, but decreased to 140s by time of discharge with  her dialysis.    CO2 was within normal limits.  BUN and creatinine on admit were 14 and 2.54, but   the time of discharge with dialysis this worsened to 48 and 5.8.  Phosphorus is   elevated.  Calcium is elevated.  She has on binders for this.  Her alkaline   phosphatase was also elevated prior to discharge.  Her albumin decreased to 3.1   prior to discharge.  Her COVID was negative.  Accu-Cheks were monitored closely   throughout hospitalization.  Sliding scale was used to control her blood sugars.    Her last chest x-ray during hospitalization was done on 07/29/2023 showing   cardiomegaly and prominent pulmonary vasculature with mild increased   interstitial markings.  Also suggestion of interstitial pulmonary edema was   made.  It was noted that this was a suboptimal inspiration based on the   patient's habitus.  By the 2nd, her wheezing had resolved and she was breathing   much better.  She was still on oxygen, which is not new for her.  She was   discharged in stable condition back to the nursing home.    ASSESSMENT:    1. End-stage renal disease, on dialysis with volume overload.  2. Chronic congestive heart failure with acute worsening.  3. Pneumonia with hypoxia.  4. Morbid obesity.  5. Atrial fibrillation with rapid rate-improved.  She is anticoagulated.  6. Morbid obesity.  7. Low blood pressure during hospitalization.  The patient was asymptomatic.  8. Medical problems as per problem list.  9. Anemia.  10. Low albumin.    PLAN:  Discharge the patient back to the nursing home.  Continue dialysis from   the nursing home.  Follow up with me in 1 week.  Continue Tylenol.  No NSAIDs.    Continue DuoNeb treatments 3 mL q.6 hours per nebulizer while awake as needed.    Atorvastatin 40 mg 1 p.o. daily, budesonide per nebulizer 0.5 mg twice a day,   Eliquis 2.5 mg twice a day, Prozac 40 mg one p.o. daily, Flonase as prior to   hospitalization, gabapentin 100 mg 1 twice a day, Humulin 70/30, 15 units twice   a day  prior to breakfast and supper, regular insulin as per sliding scale.    Lactobacillus 1 p.o. daily, levothyroxine 200 mcg 1 p.o. daily.  Loratadine 10   mg 1 p.o. daily, metolazone 5 mg 1 p.o. daily, metoprolol succinate 50 mg take 2   a day, multivitamin 1 p.o. daily, Protonix 40 mg 1 p.o. daily, Prevident 5000   plus as prior to hospitalization, Requip 0.5 mg 3 times a day, torsemide 20 mg 1   p.o. daily, Toradol 5 mg 1 q.6 hours p.r.n. pain.  Sonata 10 mg at bedtime for   sleep, diltiazem has been discontinued.  Continue cardiac diabetic renal diet.    Increase activity as tolerated.  Follow up with me in 1 week.  Follow up with   dialysis scheduled.        ______________________________  MD CHRISTINE Cardenas/LEIGHTON  DD:  08/12/2023  Time:  01:53PM  DT:  08/12/2023  Time:  05:04PM  Job #:  [id number]      DISCHARGE SUMMARY

## 2023-08-21 ENCOUNTER — HOSPITAL ENCOUNTER (OUTPATIENT)
Facility: HOSPITAL | Age: 78
End: 2023-08-22
Attending: EMERGENCY MEDICINE | Admitting: FAMILY MEDICINE
Payer: MEDICARE

## 2023-08-21 DIAGNOSIS — I95.2 HYPOTENSION DUE TO DRUGS: ICD-10-CM

## 2023-08-21 DIAGNOSIS — G24.01 DRUG INDUCED SUBACUTE DYSKINESIA: ICD-10-CM

## 2023-08-21 DIAGNOSIS — R25.1 TREMOR: Primary | ICD-10-CM

## 2023-08-21 DIAGNOSIS — R07.9 CHEST PAIN: ICD-10-CM

## 2023-08-21 DIAGNOSIS — I95.9 HYPOTENSION, UNSPECIFIED HYPOTENSION TYPE: ICD-10-CM

## 2023-08-21 DIAGNOSIS — G25.79 SEROTONIN SYNDROME: ICD-10-CM

## 2023-08-21 LAB
ALBUMIN SERPL-MCNC: 3.5 G/DL (ref 3.4–4.8)
ALBUMIN/GLOB SERPL: 1.2 RATIO (ref 1.1–2)
ALP SERPL-CCNC: 175 UNIT/L (ref 40–150)
ALT SERPL-CCNC: 21 UNIT/L (ref 0–55)
APAP SERPL-MCNC: <17.4 UG/ML (ref 17.4–30)
APPEARANCE UR: CLEAR
AST SERPL-CCNC: 23 UNIT/L (ref 5–34)
BACTERIA #/AREA URNS AUTO: ABNORMAL /HPF
BASOPHILS # BLD AUTO: 0.02 X10(3)/MCL
BASOPHILS NFR BLD AUTO: 0.3 %
BILIRUB SERPL-MCNC: 0.6 MG/DL
BILIRUB UR QL STRIP.AUTO: ABNORMAL
BUN SERPL-MCNC: 34 MG/DL (ref 9.8–20.1)
CALCIUM SERPL-MCNC: 9.1 MG/DL (ref 8.4–10.2)
CHLORIDE SERPL-SCNC: 99 MMOL/L (ref 98–107)
CK SERPL-CCNC: 28 U/L (ref 29–168)
CO2 SERPL-SCNC: 30 MMOL/L (ref 23–31)
COLOR UR: YELLOW
CREAT SERPL-MCNC: 4.69 MG/DL (ref 0.55–1.02)
EOSINOPHIL # BLD AUTO: 0.26 X10(3)/MCL (ref 0–0.9)
EOSINOPHIL NFR BLD AUTO: 3.8 %
ERYTHROCYTE [DISTWIDTH] IN BLOOD BY AUTOMATED COUNT: 17.4 % (ref 11.5–17)
GFR SERPLBLD CREATININE-BSD FMLA CKD-EPI: 9 MLS/MIN/1.73/M2
GLOBULIN SER-MCNC: 2.9 GM/DL (ref 2.4–3.5)
GLUCOSE SERPL-MCNC: 85 MG/DL (ref 82–115)
GLUCOSE UR QL STRIP.AUTO: NEGATIVE
HCT VFR BLD AUTO: 41.1 % (ref 37–47)
HGB BLD-MCNC: 12.2 G/DL (ref 12–16)
IMM GRANULOCYTES # BLD AUTO: 0.01 X10(3)/MCL (ref 0–0.04)
IMM GRANULOCYTES NFR BLD AUTO: 0.1 %
KETONES UR QL STRIP.AUTO: NEGATIVE
LEUKOCYTE ESTERASE UR QL STRIP.AUTO: ABNORMAL
LYMPHOCYTES # BLD AUTO: 1.14 X10(3)/MCL (ref 0.6–4.6)
LYMPHOCYTES NFR BLD AUTO: 16.7 %
MAGNESIUM SERPL-MCNC: 2.4 MG/DL (ref 1.6–2.6)
MCH RBC QN AUTO: 31.4 PG (ref 27–31)
MCHC RBC AUTO-ENTMCNC: 29.7 G/DL (ref 33–36)
MCV RBC AUTO: 105.7 FL (ref 80–94)
MONOCYTES # BLD AUTO: 0.52 X10(3)/MCL (ref 0.1–1.3)
MONOCYTES NFR BLD AUTO: 7.6 %
NEUTROPHILS # BLD AUTO: 4.88 X10(3)/MCL (ref 2.1–9.2)
NEUTROPHILS NFR BLD AUTO: 71.5 %
NITRITE UR QL STRIP.AUTO: NEGATIVE
PH UR STRIP.AUTO: 5.5 [PH]
PLATELET # BLD AUTO: 153 X10(3)/MCL (ref 130–400)
PMV BLD AUTO: 10.3 FL (ref 7.4–10.4)
POCT GLUCOSE: 157 MG/DL (ref 70–110)
POCT GLUCOSE: 85 MG/DL (ref 70–110)
POTASSIUM SERPL-SCNC: 4.5 MMOL/L (ref 3.5–5.1)
PROT SERPL-MCNC: 6.4 GM/DL (ref 5.8–7.6)
PROT UR QL STRIP.AUTO: ABNORMAL
RBC # BLD AUTO: 3.89 X10(6)/MCL (ref 4.2–5.4)
RBC #/AREA URNS AUTO: ABNORMAL /HPF
RBC UR QL AUTO: ABNORMAL
SODIUM SERPL-SCNC: 143 MMOL/L (ref 136–145)
SP GR UR STRIP.AUTO: 1.02
SQUAMOUS #/AREA URNS AUTO: ABNORMAL /HPF
TROPONIN I SERPL-MCNC: <0.01 NG/ML (ref 0–0.04)
TSH SERPL-ACNC: 1.57 UIU/ML (ref 0.35–4.94)
UROBILINOGEN UR STRIP-ACNC: 0.2
WBC # SPEC AUTO: 6.83 X10(3)/MCL (ref 4.5–11.5)
WBC #/AREA URNS AUTO: ABNORMAL /HPF
YEAST URNS QL MICRO: ABNORMAL /HPF

## 2023-08-21 PROCEDURE — 96374 THER/PROPH/DIAG INJ IV PUSH: CPT

## 2023-08-21 PROCEDURE — 96372 THER/PROPH/DIAG INJ SC/IM: CPT | Performed by: FAMILY MEDICINE

## 2023-08-21 PROCEDURE — 25000003 PHARM REV CODE 250: Performed by: INTERNAL MEDICINE

## 2023-08-21 PROCEDURE — 84484 ASSAY OF TROPONIN QUANT: CPT | Performed by: EMERGENCY MEDICINE

## 2023-08-21 PROCEDURE — 25000003 PHARM REV CODE 250: Performed by: FAMILY MEDICINE

## 2023-08-21 PROCEDURE — 81001 URINALYSIS AUTO W/SCOPE: CPT | Mod: 59 | Performed by: FAMILY MEDICINE

## 2023-08-21 PROCEDURE — 94761 N-INVAS EAR/PLS OXIMETRY MLT: CPT

## 2023-08-21 PROCEDURE — 82550 ASSAY OF CK (CPK): CPT | Performed by: FAMILY MEDICINE

## 2023-08-21 PROCEDURE — 80143 DRUG ASSAY ACETAMINOPHEN: CPT | Performed by: FAMILY MEDICINE

## 2023-08-21 PROCEDURE — 80053 COMPREHEN METABOLIC PANEL: CPT | Performed by: EMERGENCY MEDICINE

## 2023-08-21 PROCEDURE — 83735 ASSAY OF MAGNESIUM: CPT | Performed by: EMERGENCY MEDICINE

## 2023-08-21 PROCEDURE — 96375 TX/PRO/DX INJ NEW DRUG ADDON: CPT | Mod: 59

## 2023-08-21 PROCEDURE — 84443 ASSAY THYROID STIM HORMONE: CPT | Performed by: FAMILY MEDICINE

## 2023-08-21 PROCEDURE — 85025 COMPLETE CBC W/AUTO DIFF WBC: CPT | Performed by: EMERGENCY MEDICINE

## 2023-08-21 PROCEDURE — G0378 HOSPITAL OBSERVATION PER HR: HCPCS

## 2023-08-21 PROCEDURE — 63600175 PHARM REV CODE 636 W HCPCS: Performed by: EMERGENCY MEDICINE

## 2023-08-21 PROCEDURE — 25000242 PHARM REV CODE 250 ALT 637 W/ HCPCS: Performed by: FAMILY MEDICINE

## 2023-08-21 PROCEDURE — A4216 STERILE WATER/SALINE, 10 ML: HCPCS | Performed by: INTERNAL MEDICINE

## 2023-08-21 PROCEDURE — G0257 UNSCHED DIALYSIS ESRD PT HOS: HCPCS

## 2023-08-21 PROCEDURE — 63600175 PHARM REV CODE 636 W HCPCS: Performed by: FAMILY MEDICINE

## 2023-08-21 PROCEDURE — 63600175 PHARM REV CODE 636 W HCPCS: Performed by: INTERNAL MEDICINE

## 2023-08-21 PROCEDURE — 87040 BLOOD CULTURE FOR BACTERIA: CPT | Mod: 91 | Performed by: FAMILY MEDICINE

## 2023-08-21 PROCEDURE — 99285 EMERGENCY DEPT VISIT HI MDM: CPT | Mod: 25

## 2023-08-21 PROCEDURE — 94640 AIRWAY INHALATION TREATMENT: CPT | Mod: XB

## 2023-08-21 PROCEDURE — 27000221 HC OXYGEN, UP TO 24 HOURS

## 2023-08-21 RX ORDER — LEVOTHYROXINE SODIUM 100 UG/1
200 TABLET ORAL DAILY
Status: DISCONTINUED | OUTPATIENT
Start: 2023-08-22 | End: 2023-08-22 | Stop reason: HOSPADM

## 2023-08-21 RX ORDER — DIAZEPAM 5 MG/1
5 TABLET ORAL EVERY 8 HOURS
Status: DISCONTINUED | OUTPATIENT
Start: 2023-08-21 | End: 2023-08-22 | Stop reason: HOSPADM

## 2023-08-21 RX ORDER — ONDANSETRON 4 MG/1
4 TABLET, FILM COATED ORAL EVERY 6 HOURS PRN
Status: DISCONTINUED | OUTPATIENT
Start: 2023-08-21 | End: 2023-08-22 | Stop reason: HOSPADM

## 2023-08-21 RX ORDER — PRIMIDONE 50 MG/1
50 TABLET ORAL NIGHTLY
Status: ON HOLD | COMMUNITY
Start: 2023-08-18 | End: 2023-08-21

## 2023-08-21 RX ORDER — ACETAMINOPHEN 325 MG/1
650 TABLET ORAL EVERY 8 HOURS PRN
Status: DISCONTINUED | OUTPATIENT
Start: 2023-08-21 | End: 2023-08-22 | Stop reason: HOSPADM

## 2023-08-21 RX ORDER — METOPROLOL SUCCINATE 25 MG/1
25 TABLET, EXTENDED RELEASE ORAL 2 TIMES DAILY
Status: ON HOLD | COMMUNITY
End: 2024-01-20 | Stop reason: HOSPADM

## 2023-08-21 RX ORDER — GABAPENTIN 300 MG/1
300 CAPSULE ORAL 3 TIMES DAILY
COMMUNITY

## 2023-08-21 RX ORDER — METOLAZONE 2.5 MG/1
5 TABLET ORAL DAILY
Status: DISCONTINUED | OUTPATIENT
Start: 2023-08-22 | End: 2023-08-22 | Stop reason: HOSPADM

## 2023-08-21 RX ORDER — DIPHENHYDRAMINE HYDROCHLORIDE 50 MG/ML
25 INJECTION INTRAMUSCULAR; INTRAVENOUS
Status: COMPLETED | OUTPATIENT
Start: 2023-08-21 | End: 2023-08-21

## 2023-08-21 RX ORDER — ZOLPIDEM TARTRATE 5 MG/1
5 TABLET ORAL NIGHTLY
Status: DISCONTINUED | OUTPATIENT
Start: 2023-08-21 | End: 2023-08-22 | Stop reason: HOSPADM

## 2023-08-21 RX ORDER — LEVOTHYROXINE SODIUM 50 UG/1
50 TABLET ORAL
COMMUNITY

## 2023-08-21 RX ORDER — BACITRACIN 500 [USP'U]/G
OINTMENT TOPICAL 2 TIMES DAILY
Status: DISCONTINUED | OUTPATIENT
Start: 2023-08-21 | End: 2023-08-22 | Stop reason: HOSPADM

## 2023-08-21 RX ORDER — L. ACIDOPHILUS/L.BULGARICUS 100MM CELL
1 GRANULES IN PACKET (EA) ORAL DAILY
Status: DISCONTINUED | OUTPATIENT
Start: 2023-08-22 | End: 2023-08-22 | Stop reason: HOSPADM

## 2023-08-21 RX ORDER — SODIUM CHLORIDE 0.9 % (FLUSH) 0.9 %
10 SYRINGE (ML) INJECTION EVERY 8 HOURS
Status: DISCONTINUED | OUTPATIENT
Start: 2023-08-21 | End: 2023-08-22 | Stop reason: HOSPADM

## 2023-08-21 RX ORDER — GLUCAGON 1 MG
1 KIT INJECTION
Status: DISCONTINUED | OUTPATIENT
Start: 2023-08-21 | End: 2023-08-22 | Stop reason: HOSPADM

## 2023-08-21 RX ORDER — DIAZEPAM 5 MG/1
5 TABLET ORAL ONCE
Status: COMPLETED | OUTPATIENT
Start: 2023-08-21 | End: 2023-08-21

## 2023-08-21 RX ORDER — L. ACIDOPHILUS/L.BULGARICUS 100MM CELL
1 GRANULES IN PACKET (EA) ORAL DAILY
COMMUNITY

## 2023-08-21 RX ORDER — BACITRACIN 500 [USP'U]/G
OINTMENT TOPICAL 2 TIMES DAILY
COMMUNITY

## 2023-08-21 RX ORDER — IBUPROFEN 200 MG
24 TABLET ORAL
Status: DISCONTINUED | OUTPATIENT
Start: 2023-08-21 | End: 2023-08-22 | Stop reason: HOSPADM

## 2023-08-21 RX ORDER — BUDESONIDE 0.5 MG/2ML
0.5 INHALANT ORAL 2 TIMES DAILY
Status: DISCONTINUED | OUTPATIENT
Start: 2023-08-21 | End: 2023-08-22 | Stop reason: HOSPADM

## 2023-08-21 RX ORDER — ONDANSETRON 4 MG/1
TABLET, FILM COATED ORAL
COMMUNITY

## 2023-08-21 RX ORDER — ATORVASTATIN CALCIUM 40 MG/1
40 TABLET, FILM COATED ORAL DAILY
Status: DISCONTINUED | OUTPATIENT
Start: 2023-08-21 | End: 2023-08-22 | Stop reason: HOSPADM

## 2023-08-21 RX ORDER — LEVOTHYROXINE SODIUM 50 UG/1
50 TABLET ORAL
Status: DISCONTINUED | OUTPATIENT
Start: 2023-08-22 | End: 2023-08-22 | Stop reason: HOSPADM

## 2023-08-21 RX ORDER — INSULIN ASPART 100 [IU]/ML
1-10 INJECTION, SOLUTION INTRAVENOUS; SUBCUTANEOUS
Status: DISCONTINUED | OUTPATIENT
Start: 2023-08-21 | End: 2023-08-22 | Stop reason: HOSPADM

## 2023-08-21 RX ORDER — NALOXONE HCL 0.4 MG/ML
0.02 VIAL (ML) INJECTION
Status: DISCONTINUED | OUTPATIENT
Start: 2023-08-21 | End: 2023-08-22 | Stop reason: HOSPADM

## 2023-08-21 RX ORDER — NYSTATIN 100000 [USP'U]/ML
SUSPENSION ORAL
COMMUNITY

## 2023-08-21 RX ORDER — GLYCERIN 0.25 %
2 DROPS OPHTHALMIC (EYE) EVERY 6 HOURS PRN
Status: ON HOLD | COMMUNITY
End: 2023-08-21

## 2023-08-21 RX ORDER — FLUTICASONE PROPIONATE 50 MCG
SPRAY, SUSPENSION (ML) NASAL
COMMUNITY
Start: 2023-08-15

## 2023-08-21 RX ORDER — HYDROCORTISONE ACETATE 25 MG/1
25 SUPPOSITORY RECTAL 2 TIMES DAILY
COMMUNITY

## 2023-08-21 RX ORDER — GLYCERIN 1 G/1
1 SUPPOSITORY RECTAL DAILY
COMMUNITY

## 2023-08-21 RX ORDER — SODIUM CHLORIDE 0.9 % (FLUSH) 0.9 %
10 SYRINGE (ML) INJECTION EVERY 12 HOURS PRN
Status: DISCONTINUED | OUTPATIENT
Start: 2023-08-21 | End: 2023-08-22 | Stop reason: HOSPADM

## 2023-08-21 RX ORDER — SODIUM CHLORIDE 9 MG/ML
1000 INJECTION, SOLUTION INTRAVENOUS
Status: COMPLETED | OUTPATIENT
Start: 2023-08-21 | End: 2023-08-21

## 2023-08-21 RX ORDER — ROPINIROLE 1 MG/1
1 TABLET, FILM COATED ORAL 3 TIMES DAILY
Status: ON HOLD | COMMUNITY
Start: 2023-08-15 | End: 2023-08-22 | Stop reason: HOSPADM

## 2023-08-21 RX ORDER — IBUPROFEN 200 MG
16 TABLET ORAL
Status: DISCONTINUED | OUTPATIENT
Start: 2023-08-21 | End: 2023-08-22 | Stop reason: HOSPADM

## 2023-08-21 RX ORDER — METOLAZONE 5 MG/1
TABLET ORAL
Status: ON HOLD | COMMUNITY
Start: 2022-02-07 | End: 2024-01-20 | Stop reason: HOSPADM

## 2023-08-21 RX ORDER — HYDROCORTISONE ACETATE 25 MG/1
1 SUPPOSITORY RECTAL
COMMUNITY
Start: 2023-08-15

## 2023-08-21 RX ORDER — PANTOPRAZOLE SODIUM 40 MG/1
40 TABLET, DELAYED RELEASE ORAL DAILY
Status: DISCONTINUED | OUTPATIENT
Start: 2023-08-22 | End: 2023-08-22 | Stop reason: HOSPADM

## 2023-08-21 RX ORDER — ONDANSETRON 2 MG/ML
4 INJECTION INTRAMUSCULAR; INTRAVENOUS
Status: COMPLETED | OUTPATIENT
Start: 2023-08-21 | End: 2023-08-21

## 2023-08-21 RX ORDER — ACETAMINOPHEN 500 MG
1000 TABLET ORAL
Status: COMPLETED | OUTPATIENT
Start: 2023-08-21 | End: 2023-08-21

## 2023-08-21 RX ORDER — IPRATROPIUM BROMIDE AND ALBUTEROL SULFATE 2.5; .5 MG/3ML; MG/3ML
3 SOLUTION RESPIRATORY (INHALATION)
Status: DISCONTINUED | OUTPATIENT
Start: 2023-08-21 | End: 2023-08-22 | Stop reason: HOSPADM

## 2023-08-21 RX ORDER — METOPROLOL SUCCINATE 25 MG/1
25 TABLET, EXTENDED RELEASE ORAL 2 TIMES DAILY
Status: DISCONTINUED | OUTPATIENT
Start: 2023-08-21 | End: 2023-08-22 | Stop reason: HOSPADM

## 2023-08-21 RX ORDER — ACETAMINOPHEN 500 MG
500 TABLET ORAL EVERY 6 HOURS PRN
Status: DISCONTINUED | OUTPATIENT
Start: 2023-08-21 | End: 2023-08-22 | Stop reason: HOSPADM

## 2023-08-21 RX ORDER — HYDROCORTISONE ACETATE 25 MG/1
25 SUPPOSITORY RECTAL 2 TIMES DAILY
Status: DISCONTINUED | OUTPATIENT
Start: 2023-08-21 | End: 2023-08-22 | Stop reason: HOSPADM

## 2023-08-21 RX ORDER — BUDESONIDE 0.5 MG/2ML
0.5 INHALANT ORAL EVERY 12 HOURS
Status: DISCONTINUED | OUTPATIENT
Start: 2023-08-21 | End: 2023-08-21

## 2023-08-21 RX ORDER — FLUTICASONE PROPIONATE AND SALMETEROL 250; 50 UG/1; UG/1
1 POWDER RESPIRATORY (INHALATION) 2 TIMES DAILY
COMMUNITY

## 2023-08-21 RX ADMIN — ONDANSETRON 4 MG: 2 INJECTION INTRAMUSCULAR; INTRAVENOUS at 05:08

## 2023-08-21 RX ADMIN — SODIUM CHLORIDE 1000 ML: 9 INJECTION, SOLUTION INTRAVENOUS at 08:08

## 2023-08-21 RX ADMIN — METOPROLOL SUCCINATE 25 MG: 25 TABLET, EXTENDED RELEASE ORAL at 09:08

## 2023-08-21 RX ADMIN — ACETAMINOPHEN 650 MG: 325 TABLET, FILM COATED ORAL at 01:08

## 2023-08-21 RX ADMIN — VANCOMYCIN HYDROCHLORIDE 1000 MG: 1 INJECTION, POWDER, LYOPHILIZED, FOR SOLUTION INTRAVENOUS at 05:08

## 2023-08-21 RX ADMIN — BACITRACIN: 500 OINTMENT TOPICAL at 09:08

## 2023-08-21 RX ADMIN — Medication 10 ML: at 10:08

## 2023-08-21 RX ADMIN — IPRATROPIUM BROMIDE AND ALBUTEROL SULFATE 3 ML: .5; 3 SOLUTION RESPIRATORY (INHALATION) at 07:08

## 2023-08-21 RX ADMIN — SODIUM CHLORIDE 250 ML: 9 INJECTION, SOLUTION INTRAVENOUS at 06:08

## 2023-08-21 RX ADMIN — DIAZEPAM 5 MG: 5 TABLET ORAL at 10:08

## 2023-08-21 RX ADMIN — DIAZEPAM 5 MG: 5 TABLET ORAL at 01:08

## 2023-08-21 RX ADMIN — SODIUM CHLORIDE 250 ML: 9 INJECTION, SOLUTION INTRAVENOUS at 07:08

## 2023-08-21 RX ADMIN — ZOLPIDEM TARTRATE 5 MG: 5 TABLET ORAL at 09:08

## 2023-08-21 RX ADMIN — DIPHENHYDRAMINE HYDROCHLORIDE 25 MG: 50 INJECTION INTRAMUSCULAR; INTRAVENOUS at 07:08

## 2023-08-21 RX ADMIN — INSULIN ASPART 1 UNITS: 100 INJECTION, SOLUTION INTRAVENOUS; SUBCUTANEOUS at 10:08

## 2023-08-21 RX ADMIN — Medication 10 ML: at 01:08

## 2023-08-21 RX ADMIN — BUDESONIDE 0.5 MG: 0.5 INHALANT RESPIRATORY (INHALATION) at 08:08

## 2023-08-21 RX ADMIN — ACETAMINOPHEN 1000 MG: 500 TABLET, FILM COATED ORAL at 08:08

## 2023-08-21 RX ADMIN — APIXABAN 2.5 MG: 2.5 TABLET, FILM COATED ORAL at 09:08

## 2023-08-21 RX ADMIN — ATORVASTATIN CALCIUM 40 MG: 40 TABLET, FILM COATED ORAL at 05:08

## 2023-08-21 NOTE — PLAN OF CARE
Problem: Adult Inpatient Plan of Care  Goal: Plan of Care Review  Outcome: Ongoing, Progressing  Goal: Patient-Specific Goal (Individualized)  Outcome: Ongoing, Progressing  Goal: Absence of Hospital-Acquired Illness or Injury  Outcome: Ongoing, Progressing  Goal: Optimal Comfort and Wellbeing  Outcome: Ongoing, Progressing  Goal: Readiness for Transition of Care  Outcome: Ongoing, Progressing     Problem: Bariatric Environmental Safety  Goal: Safety Maintained with Care  Outcome: Ongoing, Progressing     Problem: Fluid Imbalance (Pneumonia)  Goal: Fluid Balance  Outcome: Ongoing, Progressing     Problem: Infection (Pneumonia)  Goal: Resolution of Infection Signs and Symptoms  Outcome: Ongoing, Progressing     Problem: Respiratory Compromise (Pneumonia)  Goal: Effective Oxygenation and Ventilation  Outcome: Ongoing, Progressing     Problem: Skin Injury Risk Increased  Goal: Skin Health and Integrity  Outcome: Ongoing, Progressing     Problem: Adjustment to Illness (Chronic Kidney Disease)  Goal: Optimal Coping with Chronic Illness  Outcome: Ongoing, Progressing     Problem: Electrolyte Imbalance (Chronic Kidney Disease)  Goal: Electrolyte Balance  Outcome: Ongoing, Progressing     Problem: Fluid Volume Excess (Chronic Kidney Disease)  Goal: Fluid Balance  Outcome: Ongoing, Progressing     Problem: Functional Decline (Chronic Kidney Disease)  Goal: Optimal Functional Ability  Outcome: Ongoing, Progressing     Problem: Hematologic Alteration (Chronic Kidney Disease)  Goal: Absence of Anemia Signs and Symptoms  Outcome: Ongoing, Progressing     Problem: Oral Intake Inadequate (Chronic Kidney Disease)  Goal: Optimal Oral Intake  Outcome: Ongoing, Progressing     Problem: Pain (Chronic Kidney Disease)  Goal: Acceptable Pain Control  Outcome: Ongoing, Progressing     Problem: Renal Function Impairment (Chronic Kidney Disease)  Goal: Minimize Renal Failure Effects  Outcome: Ongoing, Progressing

## 2023-08-21 NOTE — H&P
[facility]                     [address] [facility] [address]    PATIENT NAME:       DANAE SANCHEZ  YOB: 1945  CSN:                [id number]   MRN:                [id number]  ADMIT DATE:         2023 04:16:00  PHYSICIAN:          Ludin Velasco MD                        HISTORY AND PHYSICAL      HISTORY OF PRESENT ILLNESS:  Ms. Danae Sanchez is a 78-year-old nursing home   patient, who presented to the emergency room with tremors, and also uncontrolled   blood pressure and pulse.  She was flushed and very anxious.  Recently, she had   primidone added to her Prozac and ropinirole, and she states that is when these   symptoms were noted to have started.  This was started for restless legs   syndrome.  She also takes tramadol p.r.n.  She was admitted for workup of her   tremors.  She was supposed to go to dialysis today, but she is unable to go due   to what she stated above.    PAST MEDICAL HISTORY:  Significant for her end-stage renal disease, on dialysis.    She has underlying atrial fibrillation, atherosclerosis of the coronary   arteries, previous basal cell carcinoma removed from her nose, hypertension,   depression, anxiety, diabetes type 2, ejection fraction less than 50%,   hyperlipidemia, hypothyroidism, ischemic heart disease, lymphoma, currently in   remission.  Morbid obesity, obstructive sleep apnea, osteoporosis, peptic ulcer,   venous insufficiency with bilateral lower extremity edema, vitamin D   deficiency, and cataracts.    PAST SURGICAL HISTORY:  Include carpal tunnel release, cholecystectomy,   colonoscopy, hemorrhoid surgery, hysterectomy, mole removal, left arm fistula   placement for dialysis in  and fistulogram of the left fistula in .  She   had another fistulogram in .    FAMILY HISTORY:  Father had heart attack and is .  Mother had congestive   heart failure  and , she also had diabetes type 2.  Brother had colon   cancer and lung cancer.    SOCIAL HISTORY:  She does not smoke.  She is .  She does not drink   alcohol.  She lives at the nursing home.    ALLERGIES:  INCLUDE CODEINE, LISINOPRIL, MORPHINE, CORTISONE.     MEDICATIONS:  Currently include;   1. Prozac.  2. Humulin R sliding scale.  3. 70/30 Humulin 15 units in the morning prior to meal and 15 units in the   evening prior to meal.  4. Torsemide 20 mg 1 p.o. every other day.  5. Midodrine 10 mg Monday, Wednesday, and Friday.   6. Protonix 40 mg 1 p.o. daily.  7. Flonase as directed.  8. Atorvastatin 40 mg 1 p.o. daily.  9. Claritin 10 mg daily.  10. PreviDent 5000 units Plus.  11. Multivitamin 1 a day.    12. Tramadol 50 mg t.i.d. p.r.n. pain.  13. Tylenol as needed.  14. Zofran q.4 hours as needed.  15. Proventil HFA as needed.  16. Phenol 1.4% liquid spray swish and spit every 2 hours as needed.   17. Gabapentin 100 mg twice a day.  18. Eliquis 2.5 mg twice a day.  19. Metoprolol 25 mg 1 p.o. daily.  20. Nystatin for thrush.  21. Biotene for dry mouth.  22. Metolazone 5 mg once a day.  23. Levothyroxine 50 mcg 1 p.o. daily along with 200 mcg 1 p.o. daily.  24. Probiotic as needed.   25. Advair Diskus 1 puff twice a day.  26. Ropinirole 1 mg take a half 3 times a day.  27. Sonata 10 mg q.h.s.  28. Fluoxetine 40 mg 1 p.o. daily.    IMMUNIZATIONS:  Up-to-date.    CODE STATUS:  Full.    PHYSICAL EXAMINATION:  GENERAL:  The patient is obese, lying in bed.  She is slightly anxious, but   alert.  VITAL SIGNS:  Temperature max is 99.2.  Her blood pressure was elevated in the   emergency room.  Her pulse was up to 120.  Currently, she is on 3 L of oxygen   per nasal cannula at 98%.  Labs, x-rays, medications, and ER records are   reviewed.   HEART:  Regular rate and rhythm.    LUNGS:  Clear to auscultation bilaterally.  ABDOMEN:  Morbidly obese, nontender.  EXTREMITIES:  She has venous arterial shunt in  the left upper arm.  She has   hypertrophic hyperpigmented changes of her bilateral lower extremities from   previous venous insufficiency/lymphedema.    :  Deferred at this time.  Rectal:  Deferred at this time.  BREASTS:  Deferred at this time.    NEUROLOGIC:  There were no focal deficits.  She does have fine tremor of her   hands, however.    ASSESSMENT:    1. Tremor, possibly due to serotonin syndrome.  2. Uncontrolled blood pressure and pulse.  3. Elevated temperature.  4. Morbid obesity.  5. End-stage renal disease.  6. Chronic pruritus.  7. Multiple other medical problems as per problem list.    PLAN:  Continue current care.  Discontinue Prozac.  Check bile salts secondary   to the chronic itching.  She has had a previous gallbladder removal.  Also start   Valium.  Discontinue her primidone and ropinirole.  Discontinue her Ultram.    Consult Nephrology for dialysis.        ______________________________  Ludin Velasco MD    PBS/AQS  DD:  08/21/2023  Time:  04:06PM  DT:  08/21/2023  Time:  06:30PM  Job #:  [id number]      HISTORY AND PHYSICAL

## 2023-08-21 NOTE — ED PROVIDER NOTES
Encounter Date: 8/21/2023       History     Chief Complaint   Patient presents with    Tremors     States began on Saturday with dizziness and tremors worse than normal. Cbg 95. Dialysis MWF     The history is provided by the patient and the nursing home.   Illness   The current episode started several days ago. The problem has been gradually worsening. Nothing relieves the symptoms. Nothing aggravates the symptoms. Associated symptoms include nausea. Pertinent negatives include no fever, no sore throat, no shortness of breath and no rash.   Patient presents with worsening tremors, difficulty speaking, photophobia x 2 days.  States Dr. Mckinnon started her on a new medication 8/18 and symptoms began after that.  Due for dialysis today.    Review of patient's allergies indicates:   Allergen Reactions    Codeine Nausea And Vomiting    Cortisone Nausea Only and Nausea And Vomiting     only allergic to oral  only allergic to oral      Lisinopril Nausea And Vomiting and Rash    Morphine Itching     Past Medical History:   Diagnosis Date    Anxiety disorder, unspecified     Chronic kidney disease, unspecified     Congestive heart failure (CHF)     Depression     Diabetes mellitus     GERD (gastroesophageal reflux disease)     History of anemia due to chronic kidney disease     Hypercholesterolemia     Hypertension     Hypothyroidism, unspecified     Parkinsonism     Paroxysmal atrial fibrillation      Past Surgical History:   Procedure Laterality Date    CHOLECYSTECTOMY      Dialysis shunt Left     HYSTERECTOMY       Family History   Problem Relation Age of Onset    Hypertension Mother     Heart disease Mother     Hypertension Father     Heart disease Father     Heart disease Sister     Hypertension Sister      Social History     Tobacco Use    Smoking status: Never     Passive exposure: Past    Smokeless tobacco: Never   Substance Use Topics    Alcohol use: Not Currently    Drug use: Never     Review of Systems    Constitutional:  Negative for fever.   HENT:  Negative for sore throat.    Respiratory:  Negative for shortness of breath.    Cardiovascular:  Negative for chest pain.   Gastrointestinal:  Positive for nausea.   Genitourinary:  Negative for dysuria.   Musculoskeletal:  Negative for back pain.   Skin:  Negative for rash.   Neurological:  Negative for weakness.   Hematological:  Does not bruise/bleed easily.       Physical Exam     Initial Vitals [23 0425]   BP Pulse Resp Temp SpO2   97/66 105 20 98.4 °F (36.9 °C) 95 %      MAP       --         Physical Exam    Nursing note and vitals reviewed.  Constitutional: She appears well-developed and well-nourished.   HENT:   Head: Normocephalic and atraumatic.   Right Ear: External ear normal.   Left Ear: External ear normal.   Eyes: Conjunctivae and EOM are normal. Pupils are equal, round, and reactive to light.   Neck: Neck supple.   Normal range of motion.  Cardiovascular:  Normal heart sounds and intact distal pulses. An irregularly irregular rhythm present.   Tachycardia present.         Pulmonary/Chest: Breath sounds normal.   Abdominal: Abdomen is soft. Bowel sounds are normal.   obese   Musculoskeletal:         General: Normal range of motion.        Arms:       Cervical back: Normal range of motion and neck supple.      Right lower le+ Pitting Edema present.      Left lower le+ Pitting Edema present.     Neurological: She is alert and oriented to person, place, and time. She displays tremor. GCS score is 15. GCS eye subscore is 4. GCS verbal subscore is 5. GCS motor subscore is 6.   Slightly slurred speech but easily comprehensible   Skin: Skin is warm and dry. Capillary refill takes less than 2 seconds.   Psychiatric: She has a normal mood and affect. Her behavior is normal. Judgment and thought content normal.         ED Course   Procedures  Labs Reviewed   COMPREHENSIVE METABOLIC PANEL - Abnormal; Notable for the following components:       Result  Value    Blood Urea Nitrogen 34.0 (*)     Creatinine 4.69 (*)     Alkaline Phosphatase 175 (*)     All other components within normal limits   CBC WITH DIFFERENTIAL - Abnormal; Notable for the following components:    RBC 3.89 (*)     .7 (*)     MCH 31.4 (*)     MCHC 29.7 (*)     RDW 17.4 (*)     All other components within normal limits   MAGNESIUM - Normal   TROPONIN I - Normal   CBC W/ AUTO DIFFERENTIAL    Narrative:     The following orders were created for panel order CBC auto differential.  Procedure                               Abnormality         Status                     ---------                               -----------         ------                     CBC with Differential[[id number]]        Abnormal            Final result                 Please view results for these tests on the individual orders.          Imaging Results    None          Medications   sodium chloride 0.9% flush 10 mL (10 mLs Intravenous Given 8/21/23 1332)   acetaminophen tablet 650 mg (650 mg Oral Given 8/21/23 1333)   acetaminophen tablet 500 mg (has no administration in time range)   albuterol-ipratropium 2.5 mg-0.5 mg/3 mL nebulizer solution 3 mL (has no administration in time range)   atorvastatin tablet 40 mg (has no administration in time range)   bacitracin ointment (has no administration in time range)   budesonide nebulizer solution 0.5 mg (has no administration in time range)   apixaban tablet 2.5 mg (has no administration in time range)   hydrocortisone suppository 25 mg (has no administration in time range)   lactobacillus acidophilus & bulgar 100 million cell packet 1 each (has no administration in time range)   levothyroxine tablet 200 mcg (has no administration in time range)   levothyroxine tablet 50 mcg (has no administration in time range)   zolpidem tablet 5 mg (has no administration in time range)   pantoprazole EC tablet 40 mg (has no administration in time range)   ondansetron tablet 4 mg (has no  administration in time range)   metOLazone tablet 5 mg (has no administration in time range)   metoprolol succinate (TOPROL-XL) 24 hr tablet 25 mg (has no administration in time range)   sodium chloride 0.9% flush 10 mL (has no administration in time range)   naloxone 0.4 mg/mL injection 0.02 mg (has no administration in time range)   glucose chewable tablet 16 g (has no administration in time range)   glucose chewable tablet 24 g (has no administration in time range)   glucagon (human recombinant) injection 1 mg (has no administration in time range)   insulin aspart U-100 injection 1-10 Units (has no administration in time range)   diazePAM tablet 5 mg (has no administration in time range)   dextrose 10% bolus 125 mL 125 mL (has no administration in time range)   dextrose 10% bolus 250 mL 250 mL (has no administration in time range)   vancomycin (VANCOCIN) 1,000 mg in dextrose 5 % (D5W) 250 mL IVPB (Vial-Mate) (has no administration in time range)   ondansetron injection 4 mg (4 mg Intravenous Given 8/21/23 0517)   sodium chloride 0.9% bolus 250 mL 250 mL (0 mLs Intravenous Stopped 8/21/23 0711)   diphenhydrAMINE injection 25 mg (25 mg Intravenous Given 8/21/23 0701)   sodium chloride 0.9% bolus 250 mL 250 mL (0 mLs Intravenous Stopped 8/21/23 0806)   0.9%  NaCl infusion (1,000 mLs Intravenous New Bag 8/21/23 0820)   acetaminophen tablet 1,000 mg (1,000 mg Oral Given 8/21/23 0823)   diazePAM tablet 5 mg (5 mg Oral Given 8/21/23 1331)     Medical Decision Making  Amount and/or Complexity of Data Reviewed  Labs: ordered.    Risk  OTC drugs.  Prescription drug management.               ED Course as of 08/21/23 1630   Mon Aug 21, 2023   0723 I am Dr. Bautista I assumed the care of patient at the 6:00 a.m.    Patient was sent from the nursing home with complaint of tremors, patient has history of chronic kidney disease on hemodialysis, she was recently started on Prozac, Vistaril, ropinirole, and primidone for tremors,  and she says that the tremors got worse, she started seeing people in the room, so they sent her to the emergency room.    Patient was evaluated by Dr. Duffy, a detailed workup was done, which did not show any acute particular abnormality, but she is very concerned having tremors.    When I saw patient patient is awake alert oriented x3, she was able to recognize me from the previous ER visits, she still has some tremor going on, and she says she can not stop her head from moving, also noticed that her blood pressure is in 90s, she is on hemodialysis I did not want to give her too much IV fluids, but I decided to give her 250 mL IV fluid and dose of Benadryl to see if that helps her with possible EPS she is having    After IV fluids patient's feels better, her blood pressure is 115/76, O2 sat 100% on room air, she is feeling better after IV fluids and Benadryl, I am going to talk to Dr. hope fellow and decide about disposition further.  Since her blood pressures to the low side, I do not think that she can go to the dialysis unit to get the dialysis done.  I will decide if she possibly needs to be in the hospital for 24-48 hours. [GQ]   0802 Patient's tremor got better but her blood pressure is to the low side, she is not having any particular symptoms, I am giving her another bolus of normal saline 250 cc, I will essentially have to admit her and wait for the medicines to come out of the system, I will also consult Nephrology to do the dialysis on her probably not to take any fluid out but her potassium and bicarb is normal so even if she misses the dialysis it will not be a problem today, [GQ]   0804 I talked to Dr. hope fellow and is okay with admission.  I will hold her Prozac, ropinirole, hydroxyzine, and primidone and let Dr. Mickey connor and Pratibha decide which medicine they want to continue from them. [GQ]   0806 I will slowly hydrate her, and I feel once the medicines come out of her system her  blood pressure should maintained, I will hold her blood pressure medicines also for the time being. [GQ]      ED Course User Index  [GQ] Hamilton Bautista MD          Differential includes:  medication side effect, electrolyte disturbance, uremia, essential tremor, anxiety.  MAR from NH reviewed and she started ropinirole 1 mg PO TID on 8/15 as well as Vistaril 25 mg PO TID and gabapentin 300 mg PO TID and had primidone added on 8/18.  She also has an order for fluoxetine 80 mg PO QD from 8/14.  My suspicion is polypharmacy contributing to involuntary movements.  Will obtain CBC, CMP, mag, troponin and give antiemetic.  A query of common side effects reveals that ropinirole may very well be the main culprit that is causing her symptoms.      Labs unremarkable.  Medications almost certainly to blame.  Will turn care over to Dr. Bautista at 0600 to contact her PCP after 0700 to discuss inpatient vs outpatient treatment.    Clinical Impression:   Final diagnoses:  [R25.1] Tremor (Primary)  [G24.01] Drug induced subacute dyskinesia  [I95.9] Hypotension, unspecified hypotension type  [I95.2] Hypotension due to drugs        ED Disposition Condition    Observation Stable                Hamilton Bautista MD  08/21/23 3243

## 2023-08-22 VITALS
TEMPERATURE: 99 F | SYSTOLIC BLOOD PRESSURE: 104 MMHG | OXYGEN SATURATION: 97 % | DIASTOLIC BLOOD PRESSURE: 70 MMHG | HEART RATE: 96 BPM | RESPIRATION RATE: 20 BRPM | WEIGHT: 293 LBS | HEIGHT: 67 IN | BODY MASS INDEX: 45.99 KG/M2

## 2023-08-22 PROBLEM — G25.79 SEROTONIN SYNDROME: Status: ACTIVE | Noted: 2023-08-22

## 2023-08-22 LAB
ALBUMIN SERPL-MCNC: 3.4 G/DL (ref 3.4–4.8)
ALBUMIN/GLOB SERPL: 1.3 RATIO (ref 1.1–2)
ALP SERPL-CCNC: 166 UNIT/L (ref 40–150)
ALT SERPL-CCNC: 18 UNIT/L (ref 0–55)
AST SERPL-CCNC: 19 UNIT/L (ref 5–34)
BASOPHILS # BLD AUTO: 0.02 X10(3)/MCL
BASOPHILS NFR BLD AUTO: 0.3 %
BILIRUB SERPL-MCNC: 0.6 MG/DL
BUN SERPL-MCNC: 24 MG/DL (ref 9.8–20.1)
CALCIUM SERPL-MCNC: 8.9 MG/DL (ref 8.4–10.2)
CHLORIDE SERPL-SCNC: 102 MMOL/L (ref 98–107)
CO2 SERPL-SCNC: 32 MMOL/L (ref 23–31)
CREAT SERPL-MCNC: 3.97 MG/DL (ref 0.55–1.02)
EOSINOPHIL # BLD AUTO: 0.22 X10(3)/MCL (ref 0–0.9)
EOSINOPHIL NFR BLD AUTO: 3.8 %
ERYTHROCYTE [DISTWIDTH] IN BLOOD BY AUTOMATED COUNT: 17.6 % (ref 11.5–17)
GFR SERPLBLD CREATININE-BSD FMLA CKD-EPI: 11 MLS/MIN/1.73/M2
GLOBULIN SER-MCNC: 2.7 GM/DL (ref 2.4–3.5)
GLUCOSE SERPL-MCNC: 93 MG/DL (ref 82–115)
HCT VFR BLD AUTO: 40.4 % (ref 37–47)
HGB BLD-MCNC: 12.1 G/DL (ref 12–16)
IMM GRANULOCYTES # BLD AUTO: 0.01 X10(3)/MCL (ref 0–0.04)
IMM GRANULOCYTES NFR BLD AUTO: 0.2 %
LYMPHOCYTES # BLD AUTO: 1.09 X10(3)/MCL (ref 0.6–4.6)
LYMPHOCYTES NFR BLD AUTO: 18.7 %
MAGNESIUM SERPL-MCNC: 2.3 MG/DL (ref 1.6–2.6)
MCH RBC QN AUTO: 31.8 PG (ref 27–31)
MCHC RBC AUTO-ENTMCNC: 30 G/DL (ref 33–36)
MCV RBC AUTO: 106.3 FL (ref 80–94)
MONOCYTES # BLD AUTO: 0.53 X10(3)/MCL (ref 0.1–1.3)
MONOCYTES NFR BLD AUTO: 9.1 %
NEUTROPHILS # BLD AUTO: 3.95 X10(3)/MCL (ref 2.1–9.2)
NEUTROPHILS NFR BLD AUTO: 67.9 %
PHOSPHATE SERPL-MCNC: 3.4 MG/DL (ref 2.3–4.7)
PLATELET # BLD AUTO: 111 X10(3)/MCL (ref 130–400)
PMV BLD AUTO: 10.4 FL (ref 7.4–10.4)
POCT GLUCOSE: 114 MG/DL (ref 70–110)
POCT GLUCOSE: 96 MG/DL (ref 70–110)
POTASSIUM SERPL-SCNC: 4.3 MMOL/L (ref 3.5–5.1)
PROT SERPL-MCNC: 6.1 GM/DL (ref 5.8–7.6)
RBC # BLD AUTO: 3.8 X10(6)/MCL (ref 4.2–5.4)
SARS-COV-2 RDRP RESP QL NAA+PROBE: NEGATIVE
SODIUM SERPL-SCNC: 145 MMOL/L (ref 136–145)
T4 FREE SERPL-MCNC: 0.98 NG/DL (ref 0.7–1.48)
VANCOMYCIN TROUGH SERPL-MCNC: 8.9 UG/ML (ref 15–20)
WBC # SPEC AUTO: 5.82 X10(3)/MCL (ref 4.5–11.5)

## 2023-08-22 PROCEDURE — 94761 N-INVAS EAR/PLS OXIMETRY MLT: CPT

## 2023-08-22 PROCEDURE — 84439 ASSAY OF FREE THYROXINE: CPT | Performed by: FAMILY MEDICINE

## 2023-08-22 PROCEDURE — 83735 ASSAY OF MAGNESIUM: CPT | Performed by: FAMILY MEDICINE

## 2023-08-22 PROCEDURE — 25000003 PHARM REV CODE 250: Performed by: FAMILY MEDICINE

## 2023-08-22 PROCEDURE — 25000242 PHARM REV CODE 250 ALT 637 W/ HCPCS: Performed by: FAMILY MEDICINE

## 2023-08-22 PROCEDURE — 25000003 PHARM REV CODE 250: Performed by: INTERNAL MEDICINE

## 2023-08-22 PROCEDURE — 85025 COMPLETE CBC W/AUTO DIFF WBC: CPT | Performed by: INTERNAL MEDICINE

## 2023-08-22 PROCEDURE — 80053 COMPREHEN METABOLIC PANEL: CPT | Performed by: INTERNAL MEDICINE

## 2023-08-22 PROCEDURE — 27000221 HC OXYGEN, UP TO 24 HOURS

## 2023-08-22 PROCEDURE — 94640 AIRWAY INHALATION TREATMENT: CPT | Mod: XB

## 2023-08-22 PROCEDURE — G0378 HOSPITAL OBSERVATION PER HR: HCPCS

## 2023-08-22 PROCEDURE — 87635 SARS-COV-2 COVID-19 AMP PRB: CPT | Performed by: FAMILY MEDICINE

## 2023-08-22 PROCEDURE — 80202 ASSAY OF VANCOMYCIN: CPT | Performed by: FAMILY MEDICINE

## 2023-08-22 PROCEDURE — 84100 ASSAY OF PHOSPHORUS: CPT | Performed by: FAMILY MEDICINE

## 2023-08-22 PROCEDURE — A4216 STERILE WATER/SALINE, 10 ML: HCPCS | Performed by: INTERNAL MEDICINE

## 2023-08-22 RX ORDER — DIAZEPAM 5 MG/1
5 TABLET ORAL 2 TIMES DAILY
Qty: 60 TABLET | Refills: 0 | Status: ON HOLD | OUTPATIENT
Start: 2023-08-22 | End: 2024-01-20 | Stop reason: HOSPADM

## 2023-08-22 RX ORDER — MUPIROCIN 20 MG/G
OINTMENT TOPICAL 2 TIMES DAILY
Status: DISCONTINUED | OUTPATIENT
Start: 2023-08-22 | End: 2023-08-22 | Stop reason: HOSPADM

## 2023-08-22 RX ADMIN — METOLAZONE 5 MG: 2.5 TABLET ORAL at 10:08

## 2023-08-22 RX ADMIN — BACITRACIN: 500 OINTMENT TOPICAL at 10:08

## 2023-08-22 RX ADMIN — Medication 10 ML: at 02:08

## 2023-08-22 RX ADMIN — LACTOBACILLUS ACIDOPHILUS / LACTOBACILLUS BULGARICUS 1 EACH: 100 MILLION CFU STRENGTH GRANULES at 10:08

## 2023-08-22 RX ADMIN — BUDESONIDE 0.5 MG: 0.5 INHALANT RESPIRATORY (INHALATION) at 07:08

## 2023-08-22 RX ADMIN — IPRATROPIUM BROMIDE AND ALBUTEROL SULFATE 3 ML: .5; 3 SOLUTION RESPIRATORY (INHALATION) at 07:08

## 2023-08-22 RX ADMIN — APIXABAN 2.5 MG: 2.5 TABLET, FILM COATED ORAL at 10:08

## 2023-08-22 RX ADMIN — IPRATROPIUM BROMIDE AND ALBUTEROL SULFATE 3 ML: .5; 3 SOLUTION RESPIRATORY (INHALATION) at 01:08

## 2023-08-22 RX ADMIN — LEVOTHYROXINE SODIUM 200 MCG: 100 TABLET ORAL at 10:08

## 2023-08-22 RX ADMIN — DIAZEPAM 5 MG: 5 TABLET ORAL at 02:08

## 2023-08-22 RX ADMIN — PANTOPRAZOLE SODIUM 40 MG: 40 TABLET, DELAYED RELEASE ORAL at 10:08

## 2023-08-22 RX ADMIN — MUPIROCIN 1 G: 20 OINTMENT TOPICAL at 10:08

## 2023-08-22 RX ADMIN — DIAZEPAM 5 MG: 5 TABLET ORAL at 06:08

## 2023-08-22 RX ADMIN — LEVOTHYROXINE SODIUM 50 MCG: 50 TABLET ORAL at 06:08

## 2023-08-22 RX ADMIN — METOPROLOL SUCCINATE 25 MG: 25 TABLET, EXTENDED RELEASE ORAL at 10:08

## 2023-08-22 RX ADMIN — Medication 10 ML: at 06:08

## 2023-08-22 NOTE — PLAN OF CARE
Problem: Adult Inpatient Plan of Care  Goal: Plan of Care Review  Outcome: Ongoing, Progressing  Goal: Patient-Specific Goal (Individualized)  Outcome: Ongoing, Progressing  Goal: Absence of Hospital-Acquired Illness or Injury  Outcome: Ongoing, Progressing  Goal: Optimal Comfort and Wellbeing  Outcome: Ongoing, Progressing  Goal: Readiness for Transition of Care  Outcome: Ongoing, Progressing     Problem: Bariatric Environmental Safety  Goal: Safety Maintained with Care  Outcome: Ongoing, Progressing     Problem: Fluid Imbalance (Pneumonia)  Goal: Fluid Balance  Outcome: Ongoing, Progressing     Problem: Infection (Pneumonia)  Goal: Resolution of Infection Signs and Symptoms  Outcome: Ongoing, Progressing     Problem: Respiratory Compromise (Pneumonia)  Goal: Effective Oxygenation and Ventilation  Outcome: Ongoing, Progressing     Problem: Skin Injury Risk Increased  Goal: Skin Health and Integrity  Outcome: Ongoing, Progressing     Problem: Adjustment to Illness (Chronic Kidney Disease)  Goal: Optimal Coping with Chronic Illness  Outcome: Ongoing, Progressing     Problem: Electrolyte Imbalance (Chronic Kidney Disease)  Goal: Electrolyte Balance  Outcome: Ongoing, Progressing     Problem: Fluid Volume Excess (Chronic Kidney Disease)  Goal: Fluid Balance  Outcome: Ongoing, Progressing     Problem: Functional Decline (Chronic Kidney Disease)  Goal: Optimal Functional Ability  Outcome: Ongoing, Progressing     Problem: Hematologic Alteration (Chronic Kidney Disease)  Goal: Absence of Anemia Signs and Symptoms  Outcome: Ongoing, Progressing     Problem: Oral Intake Inadequate (Chronic Kidney Disease)  Goal: Optimal Oral Intake  Outcome: Ongoing, Progressing     Problem: Pain (Chronic Kidney Disease)  Goal: Acceptable Pain Control  Outcome: Ongoing, Progressing     Problem: Renal Function Impairment (Chronic Kidney Disease)  Goal: Minimize Renal Failure Effects  Outcome: Ongoing, Progressing     Problem:  Device-Related Complication Risk (Hemodialysis)  Goal: Safe, Effective Therapy Delivery  Outcome: Ongoing, Progressing     Problem: Hemodynamic Instability (Hemodialysis)  Goal: Effective Tissue Perfusion  Outcome: Ongoing, Progressing     Problem: Infection (Hemodialysis)  Goal: Absence of Infection Signs and Symptoms  Outcome: Ongoing, Progressing

## 2023-08-22 NOTE — PLAN OF CARE
Problem: Adult Inpatient Plan of Care  Goal: Plan of Care Review  Outcome: Ongoing, Progressing  Flowsheets (Taken 8/22/2023 1300)  Plan of Care Reviewed With: patient  Goal: Absence of Hospital-Acquired Illness or Injury  Outcome: Ongoing, Progressing  Intervention: Identify and Manage Fall Risk  Flowsheets (Taken 8/22/2023 1300)  Safety Promotion/Fall Prevention:   assistive device/personal item within reach   side rails raised x 3   instructed to call staff for mobility  Goal: Optimal Comfort and Wellbeing  Outcome: Ongoing, Progressing  Intervention: Monitor Pain and Promote Comfort  Flowsheets (Taken 8/22/2023 1300)  Pain Management Interventions:   pillow support provided   position adjusted   quiet environment facilitated  Goal: Readiness for Transition of Care  Outcome: Ongoing, Progressing

## 2023-08-22 NOTE — PLAN OF CARE
08/22/23 1415   Discharge Assessment   Assessment Type Discharge Planning Assessment   Source of Information health record   People in Home facility resident   Facility Arrived From: [facility]   Do you expect to return to your current living situation? Yes   Equipment Currently Used at Home walker, samantha   Do you take prescription medications? Yes   Do you have prescription coverage? Yes   Do you have any problems affording any of your prescribed medications? No   Is the patient taking medications as prescribed? yes   Are you on dialysis? No   Do you take coumadin? No   DME Needed Upon Discharge  none   Transition of Care Barriers None   Discharge Plan A Return to nursing home   Discharge Plan B Return to Nursing Home   Physical Activity   On average, how many days per week do you engage in moderate to strenuous exercise (like a brisk walk)? Patient refu   On average, how many minutes do you engage in exercise at this level? Patient refu   Financial Resource Strain   How hard is it for you to pay for the very basics like food, housing, medical care, and heating? Patient refu   Housing Stability   In the last 12 months, was there a time when you were not able to pay the mortgage or rent on time? ID   In the last 12 months, was there a time when you did not have a steady place to sleep or slept in a shelter (including now)? ID   Transportation Needs   In the past 12 months, has lack of transportation kept you from medical appointments or from getting medications? Patient refu   In the past 12 months, has lack of transportation kept you from meetings, work, or from getting things needed for daily living? Patient refu   Food Insecurity   Within the past 12 months, you worried that your food would run out before you got the money to buy more. Patient refu   Within the past 12 months, the food you bought just didn't last and you didn't have money to get more. Patient refu   Stress   Do you feel stress - tense, restless,  nervous, or anxious, or unable to sleep at night because your mind is troubled all the time - these days? Patient refu   Social Connections   In a typical week, how many times do you talk on the phone with family, friends, or neighbors? Patient refu   How often do you get together with friends or relatives? Patient refu   How often do you attend Adventist or Pentecostalism services? Patient refu   Do you belong to any clubs or organizations such as Adventist groups, unions, fraternal or athletic groups, or school groups? Patient refu   How often do you attend meetings of the clubs or organizations you belong to? Patient refu   Are you , , , , never , or living with a partner? Patient refu   Alcohol Use   Q1: How often do you have a drink containing alcohol? Patient refu   Q2: How many drinks containing alcohol do you have on a typical day when you are drinking? Patient refu   Q3: How often do you have six or more drinks on one occasion? Patient refu     From [facility]

## 2023-08-22 NOTE — PLAN OF CARE
08/22/23 1510   Final Note   Assessment Type Final Discharge Note   Anticipated Discharge Disposition care home [facility]   Post-Acute Status   Discharge Delays None known at this time     D/C info sent to Adriane RAMAN.  COVID swab result pending.  Nurse will call report to Fay and magui will .

## 2023-08-22 NOTE — NURSING
Report given to nurse Cruz,all questions were answered. Still awaiting for covid test result. Covid test result was negative. IV line was removed. All belongings were gathered by pt

## 2023-08-27 LAB
BACTERIA BLD CULT: NORMAL
BACTERIA BLD CULT: NORMAL

## 2023-09-23 ENCOUNTER — HOSPITAL ENCOUNTER (EMERGENCY)
Facility: HOSPITAL | Age: 78
Discharge: NURSING, REHAB OR LONG-TERM CARE | End: 2023-09-23
Attending: INTERNAL MEDICINE
Payer: MEDICARE

## 2023-09-23 VITALS
SYSTOLIC BLOOD PRESSURE: 126 MMHG | WEIGHT: 293 LBS | TEMPERATURE: 98 F | BODY MASS INDEX: 48.08 KG/M2 | OXYGEN SATURATION: 95 % | RESPIRATION RATE: 20 BRPM | DIASTOLIC BLOOD PRESSURE: 80 MMHG | HEART RATE: 83 BPM

## 2023-09-23 DIAGNOSIS — R06.02 SHORTNESS OF BREATH: ICD-10-CM

## 2023-09-23 DIAGNOSIS — I50.9 CONGESTIVE HEART FAILURE, UNSPECIFIED HF CHRONICITY, UNSPECIFIED HEART FAILURE TYPE: Primary | ICD-10-CM

## 2023-09-23 DIAGNOSIS — Z99.2 STAGE 5 CHRONIC KIDNEY DISEASE ON CHRONIC DIALYSIS: ICD-10-CM

## 2023-09-23 DIAGNOSIS — N18.6 STAGE 5 CHRONIC KIDNEY DISEASE ON CHRONIC DIALYSIS: ICD-10-CM

## 2023-09-23 LAB
BNP BLD-MCNC: 1049.2 PG/ML
TROPONIN I SERPL-MCNC: <0.01 NG/ML (ref 0–0.04)

## 2023-09-23 PROCEDURE — 84484 ASSAY OF TROPONIN QUANT: CPT | Performed by: INTERNAL MEDICINE

## 2023-09-23 PROCEDURE — 96374 THER/PROPH/DIAG INJ IV PUSH: CPT

## 2023-09-23 PROCEDURE — 93005 ELECTROCARDIOGRAM TRACING: CPT

## 2023-09-23 PROCEDURE — 83880 ASSAY OF NATRIURETIC PEPTIDE: CPT | Performed by: INTERNAL MEDICINE

## 2023-09-23 PROCEDURE — 99285 EMERGENCY DEPT VISIT HI MDM: CPT | Mod: 25

## 2023-09-23 PROCEDURE — 93010 ELECTROCARDIOGRAM REPORT: CPT | Mod: ,,, | Performed by: STUDENT IN AN ORGANIZED HEALTH CARE EDUCATION/TRAINING PROGRAM

## 2023-09-23 PROCEDURE — 63600175 PHARM REV CODE 636 W HCPCS: Performed by: INTERNAL MEDICINE

## 2023-09-23 PROCEDURE — 93010 EKG 12-LEAD: ICD-10-PCS | Mod: ,,, | Performed by: STUDENT IN AN ORGANIZED HEALTH CARE EDUCATION/TRAINING PROGRAM

## 2023-09-23 RX ORDER — FUROSEMIDE 10 MG/ML
20 INJECTION INTRAMUSCULAR; INTRAVENOUS
Status: COMPLETED | OUTPATIENT
Start: 2023-09-23 | End: 2023-09-23

## 2023-09-23 RX ADMIN — FUROSEMIDE 20 MG: 10 INJECTION, SOLUTION INTRAMUSCULAR; INTRAVENOUS at 03:09

## 2023-09-23 NOTE — DISCHARGE INSTRUCTIONS
Get your dialysis done on Monday,    Rest, continue using oxygen        Patient must be seen by patient's primary care M.D. for follow-up and further treatment as needed.     Inform patients Primary Care physician about the ER visit and need for follow up.    Get medicines and orders from the emergency room approved by patient's primary care M.D.    The exam and treatment you received in Emergency Room was for an urgent problem and NOT INTENDED AS COMPLETE CARE. It is important that you FOLLOW UP with a doctor for ongoing care. If your symptoms become WORSE or you DO NOT IMPROVE and you are unable to reach your health care provider, you should RETURN to the emergency department. The Emergency Room doctor has provided a PRELIMINARY INTERPRETATION of all your STUDIES. A final interpretation may be done after you are discharged. IF A CHANGE in your diagnosis or treatment is needed WE WILL CONTACT YOU. It is critical that we have a CURRENT PHONE NUMBER FOR YOU.

## 2023-09-23 NOTE — ED PROVIDER NOTES
Encounter Date: 9/23/2023  History from patient     History     Chief Complaint   Patient presents with    Shortness of Breath     C/o increased SOB today, 4lb weight gain since yesterday, & elevated BNP. Sent from [facility]     HPI      Danae Sanchez is 78 y.o. female who  has a past medical history of Anxiety disorder, unspecified, Chronic kidney disease, unspecified, Congestive heart failure (CHF), Depression, Diabetes mellitus, GERD (gastroesophageal reflux disease), History of anemia due to chronic kidney disease, Hypercholesterolemia, Hypertension, Hypothyroidism, unspecified, Parkinsonism, and Paroxysmal atrial fibrillation. arrives in ER with c/o Shortness of Breath (C/o increased SOB today, 4lb weight gain since yesterday, & elevated BNP. Sent from [facility])      Review of patient's allergies indicates:   Allergen Reactions    Codeine Nausea And Vomiting    Cortisone Nausea Only and Nausea And Vomiting     only allergic to oral  only allergic to oral      Lisinopril Nausea And Vomiting and Rash    Morphine Itching     Past Medical History:   Diagnosis Date    Anxiety disorder, unspecified     Chronic kidney disease, unspecified     Congestive heart failure (CHF)     Depression     Diabetes mellitus     GERD (gastroesophageal reflux disease)     History of anemia due to chronic kidney disease     Hypercholesterolemia     Hypertension     Hypothyroidism, unspecified     Parkinsonism     Paroxysmal atrial fibrillation      Past Surgical History:   Procedure Laterality Date    CHOLECYSTECTOMY      Dialysis shunt Left     HYSTERECTOMY       Family History   Problem Relation Age of Onset    Hypertension Mother     Heart disease Mother     Hypertension Father     Heart disease Father     Heart disease Sister     Hypertension Sister      Social History     Tobacco Use    Smoking status: Never     Passive exposure: Past    Smokeless tobacco: Never   Substance Use Topics    Alcohol use: Not  Currently    Drug use: Never     Review of Systems   Constitutional:  Negative for fever.   HENT:  Negative for trouble swallowing and voice change.    Eyes:  Negative for visual disturbance.   Respiratory:  Positive for shortness of breath. Negative for cough.    Cardiovascular:  Negative for chest pain.   Gastrointestinal:  Negative for abdominal pain, diarrhea and vomiting.   Genitourinary:  Negative for dysuria and hematuria.   Musculoskeletal:  Negative for gait problem.        No Pain.   Skin:  Negative for color change and rash.   Neurological:  Negative for headaches.   Psychiatric/Behavioral:  Negative for behavioral problems and sleep disturbance. The patient is nervous/anxious.    All other systems reviewed and are negative.      Physical Exam     Initial Vitals [09/23/23 1302]   BP Pulse Resp Temp SpO2   (!) 99/54 85 20 98.1 °F (36.7 °C) (!) 94 %      MAP       --         Physical Exam    Nursing note and vitals reviewed.  Constitutional: She appears well-developed and well-nourished.   Eyes: EOM are normal. Pupils are equal, round, and reactive to light.   Neck: Neck supple.   Cardiovascular:  Normal rate and regular rhythm.           Pulmonary/Chest: No respiratory distress. She has wheezes. She has no rales.   Abdominal: Abdomen is soft. Bowel sounds are normal. She exhibits no distension. There is no abdominal tenderness.   Musculoskeletal:         General: No edema. Normal range of motion.      Cervical back: Neck supple.     Neurological: She is alert and oriented to person, place, and time.   Skin: Skin is warm and dry.         ED Course   Procedures    Orders Placed This Encounter   Procedures    X-Ray Chest 1 View    Brain natriuretic peptide    Troponin I    EKG 12-lead    Insert Saline lock IV     Medications   furosemide injection 20 mg (20 mg Intravenous Given 9/23/23 1522)     Admission on 09/23/2023   Component Date Value Ref Range Status    Natriuretic Peptide 09/23/2023 1,049.2 (H)   <=100.0 pg/mL Final    Troponin-I 09/23/2023 <0.010  0.000 - 0.045 ng/mL Final   Lab Requisition on 09/23/2023   Component Date Value Ref Range Status    Sodium Level 09/23/2023 143  136 - 145 mmol/L Final    Potassium Level 09/23/2023 3.9  3.5 - 5.1 mmol/L Final    Chloride 09/23/2023 104  98 - 107 mmol/L Final    Carbon Dioxide 09/23/2023 29  23 - 31 mmol/L Final    Glucose Level 09/23/2023 148 (H)  82 - 115 mg/dL Final    Blood Urea Nitrogen 09/23/2023 15.0  9.8 - 20.1 mg/dL Final    Creatinine 09/23/2023 3.18 (H)  0.55 - 1.02 mg/dL Final    Calcium Level Total 09/23/2023 9.0  8.4 - 10.2 mg/dL Final    Protein Total 09/23/2023 5.5 (L)  5.8 - 7.6 gm/dL Final    Albumin Level 09/23/2023 3.3 (L)  3.4 - 4.8 g/dL Final    Globulin 09/23/2023 2.2 (L)  2.4 - 3.5 gm/dL Final    Albumin/Globulin Ratio 09/23/2023 1.5  1.1 - 2.0 ratio Final    Bilirubin Total 09/23/2023 0.8  <=1.5 mg/dL Final    Alkaline Phosphatase 09/23/2023 231 (H)  40 - 150 unit/L Final    Alanine Aminotransferase 09/23/2023 24  0 - 55 unit/L Final    Aspartate Aminotransferase 09/23/2023 20  5 - 34 unit/L Final    eGFR 09/23/2023 15  mls/min/1.73/m2 Final    Natriuretic Peptide 09/23/2023 1,008.7 (H)  <=100.0 pg/mL Final    WBC 09/23/2023 6.64  4.50 - 11.50 x10(3)/mcL Final    RBC 09/23/2023 3.58 (L)  4.20 - 5.40 x10(6)/mcL Final    Hgb 09/23/2023 11.4 (L)  12.0 - 16.0 g/dL Final    Hct 09/23/2023 37.0  37.0 - 47.0 % Final    MCV 09/23/2023 103.4 (H)  80.0 - 94.0 fL Final    MCH 09/23/2023 31.8 (H)  27.0 - 31.0 pg Final    MCHC 09/23/2023 30.8 (L)  33.0 - 36.0 g/dL Final    RDW 09/23/2023 17.2 (H)  11.5 - 17.0 % Final    Platelet 09/23/2023 135  130 - 400 x10(3)/mcL Final    MPV 09/23/2023 10.8 (H)  7.4 - 10.4 fL Final    Neut % 09/23/2023 65.2  % Final    Lymph % 09/23/2023 15.7  % Final    Mono % 09/23/2023 8.1  % Final    Eos % 09/23/2023 9.3  % Final    Basophil % 09/23/2023 1.5  % Final    Lymph # 09/23/2023 1.04  0.6 - 4.6 x10(3)/mcL Final     Neut # 09/23/2023 4.33  2.1 - 9.2 x10(3)/mcL Final    Mono # 09/23/2023 0.54  0.1 - 1.3 x10(3)/mcL Final    Eos # 09/23/2023 0.62  0 - 0.9 x10(3)/mcL Final    Baso # 09/23/2023 0.10  <=0.2 x10(3)/mcL Final    IG# 09/23/2023 0.01  0 - 0.04 x10(3)/mcL Final    IG% 09/23/2023 0.2  % Final       Labs Reviewed   B-TYPE NATRIURETIC PEPTIDE - Abnormal; Notable for the following components:       Result Value    Natriuretic Peptide 1,049.2 (*)     All other components within normal limits   TROPONIN I - Normal        ECG Results              EKG 12-lead (Preliminary result)  Result time 09/23/23 16:10:07      Wet Read by Hamilton Bautista MD (09/23/23 16:10:07, [facility] - Emergency Dept, Emergency Medicine)    EKG: Independently reviewed and / or Interpreted by Hamilton Bautista MD. independently as Atrial fibrillation, rate 87, Left Axis Deviation, Q waves in inferior leads, No STEMI ,                      In process by Interface, Lab In [facility] (09/23/23 15:01:21)                   Narrative:    Test Reason : R06.02,    Vent. Rate : 087 BPM     Atrial Rate : 065 BPM     P-R Int : 000 ms          QRS Dur : 116 ms      QT Int : 380 ms       P-R-T Axes : 000 -57 040 degrees     QTc Int : 457 ms    Atrial fibrillation  Left axis deviation  Low voltage QRS  Inferior infarct ,age undetermined  Abnormal ECG  When compared with ECG of 24-JUL-2023 12:39,  Inferior infarct is now Present    Referred By: [email or web address]   SELF           Confirmed By:                                   Imaging Results              X-Ray Chest 1 View (Final result)  Result time 09/23/23 14:47:31      Final result by Ish Turk MD (09/23/23 14:47:31)                   Impression:      Cardiomegaly with central pulmonary vascular congestion and interstitial markings suggesting pulmonary edema.      Electronically signed by: Ish Turk  Date:    09/23/2023  Time:    14:47               Narrative:    EXAMINATION:  XR CHEST 1  VIEW    CLINICAL HISTORY:  Shortness of breath    TECHNIQUE:  Single frontal view of the chest was performed.    COMPARISON:  Radiograph 08/01/2023    FINDINGS:  The cardiomediastinal silhouette is enlarged.  Mild elevation of the right hemidiaphragm.  Central pulmonary vascular congestion with mildly increased interstitial markings in both lungs.  No focal consolidation or pneumothorax.  No large pleural effusion.  Surgical clips overlie the left axilla.  No acute osseous abnormality identified.                                       Medications   furosemide injection 20 mg (20 mg Intravenous Given 9/23/23 1522)     Medical Decision Making  Amount and/or Complexity of Data Reviewed  Labs: ordered. Decision-making details documented in ED Course.  Radiology: ordered.    Risk  Prescription drug management.               ED Course as of 09/23/23 1610   Sat Sep 23, 2023   1454 Since patient had all the blood work done today in the system and is available already, she does not look like she needs emergent dialysis on the blood work, her chest x-ray which was done in the nursing home did not show any pneumonia, did not show any vascular congestion, I repeated the chest x-ray in the emergency room she does have some vascular congestion, but no consolidation, her troponin is normal, her BNP is in the same range as it was this morning, I talked to Dr. Ibrahim gave her the numbers and she feels that patient is in the nursing home and she can be dialyzed on her routine as she does not need emergent dialysis, I will go ahead and give her a dose of Lasix in the emergency room and let her go back to the nursing home. [GQ]   4013 I talked to patient and her son in the room, explained the blood work to them and they are okay with going back to the nursing home she is on oxygen in the nursing home I will advise him to continue that, and get the dialysis done. [GQ]      ED Course User Index  [GQ] Hamilton Bautista MD                     Clinical Impression:   Final diagnoses:  [R06.02] Shortness of breath  [I50.9] Congestive heart failure, unspecified HF chronicity, unspecified heart failure type (Primary)  [N18.6, Z99.2] Stage 5 chronic kidney disease on chronic dialysis        ED Disposition Condition    Discharge Stable          ED Prescriptions    None       Follow-up Information       Follow up With Specialties Details Why Contact Info    Ludin Velasco MD Family Medicine In 2 days  [address] [place] [address]  [phone]               Hamilton Bautista MD  09/23/23 7970       Hamilton Bautista MD  09/23/23 1614

## 2023-09-29 NOTE — DISCHARGE SUMMARY
[facility]                     [address] [address]    PATIENT NAME:       AVELINO ARCHIBALD  YOB: 1945  CSN:                [id number]   MRN:                [id number]  ADMIT DATE:         08/21/2023 04:16:00  PHYSICIAN:          Ludin Velasco MD                          DISCHARGE SUMMARY    DATE OF DISCHARGE:  08/22/2023 17:02:00    HOSPITAL COURSE:  She is a 78-year-old nursing home patient, who had presented   to the emergency room with tremors, uncontrolled blood pressure, and pulse.  She   was very anxious and flushed.  She had symptoms of serotonin syndrome and had   recently been put on primidone in addition to her Prozac and ropinirole.  She is   also on tramadol p.r.n., which she takes pretty consistently.  After   hospitalization, her serotonin reuptake inhibitors were held and her ropinirole   was held as well as her Ultram.  She had chronic pruritus as well.  Nephrology   was consulted.  The following day, her tremors were better.  Blood pressure was   under better control and she was able to be discharged back to the nursing home.    PLAN:  Discharge the patient back to nursing home.  Continue her Flonase 1 puff   both nostrils twice a day.  Anusol-HC suppositories as needed, metolazone 5 mg 1   p.o. daily, Tylenol 500 mg every 6 hours p.r.n. pain or fever.  She takes   DuoNeb treatments per nebulizer q.6 hours while awake and also budesonide   nebulizer treatments twice a day.  Continue bacitracin topically both nostrils   daily.  Lipitor 40 mg 1 p.o. daily.  Valium 5 mg b.i.d. p.r.n. anxiety.  Eliquis   2.5 mg twice a day.  Fluoride/PreviDent as prior to hospitalization.  She also   uses an Advair inhaler.  Continue use as prior to hospitalization.  Continue   Neurontin 300 mg t.i.d. and Humulin 70/30, 15 units twice a day prior to meals.    Vistaril 25 mg t.i.d. for itching.  Routine  sliding scale.  Accu-Cheks b.i.d.    Continue lactobacillus 1 p.o. daily and Biotene as prior to hospitalization.    Synthroid 200 mcg 1 p.o. daily as well as 50 mcg 1 p.o. daily, Claritin 10 mg 1   p.o. daily, metoprolol succinate 25 mg twice a day.  Multivitamin with folic   acid 400 mg take 1 daily.  Nystatin suspension 7.5 mL twice a day swish and   spit.  Zofran 4 mg q.6 hours p.r.n. nausea and vomiting and pantoprazole 40 mg 1   p.o. daily, torsemide 20 mg 1 p.o. daily.  Tramadol 50 mg 1 three times a day   p.r.n. pain.  Sonata 10 mg at bedtime for sleep and fluoxetine 40 mg was   discontinued.    DIET:  Continue cardiac, renal, and diabetic diet.    ACTIVITY:  As tolerated.  Fall precautions.    FOLLOWUP:  Continue followup with dialysis.  Follow up with me as scheduled.    ASSESSMENT:    1. Serotonin syndrome.  2. Uncontrolled hypertension and tachycardia.  3. Tremors.  4. End-stage renal disease with pruritus.  5. Morbid obesity.        ______________________________  Ludin Velasco MD    PBS/AQS  DD:  09/28/2023  Time:  02:10PM  DT:  09/28/2023  Time:  10:43PM  Job #:  [id number]      DISCHARGE SUMMARY

## 2023-10-20 ENCOUNTER — LAB REQUISITION (OUTPATIENT)
Dept: LAB | Facility: HOSPITAL | Age: 78
End: 2023-10-20
Payer: MEDICARE

## 2023-10-20 DIAGNOSIS — N17.9 ACUTE KIDNEY FAILURE, UNSPECIFIED: ICD-10-CM

## 2023-10-20 LAB
APPEARANCE UR: ABNORMAL
BACTERIA #/AREA URNS AUTO: ABNORMAL /HPF
BILIRUB UR QL STRIP.AUTO: ABNORMAL
COLOR UR AUTO: ABNORMAL
GLUCOSE UR QL STRIP.AUTO: NEGATIVE
KETONES UR QL STRIP.AUTO: ABNORMAL
LEUKOCYTE ESTERASE UR QL STRIP.AUTO: ABNORMAL
NITRITE UR QL STRIP.AUTO: NEGATIVE
PH UR STRIP.AUTO: 5 [PH]
PROT UR QL STRIP.AUTO: ABNORMAL
RBC #/AREA URNS AUTO: ABNORMAL /HPF
RBC UR QL AUTO: ABNORMAL
SP GR UR STRIP.AUTO: 1.02 (ref 1–1.03)
SQUAMOUS #/AREA URNS AUTO: ABNORMAL /HPF
UROBILINOGEN UR STRIP-ACNC: 1
WBC #/AREA URNS AUTO: ABNORMAL /HPF

## 2023-10-20 PROCEDURE — 81001 URINALYSIS AUTO W/SCOPE: CPT | Performed by: FAMILY MEDICINE

## 2023-10-20 PROCEDURE — 87077 CULTURE AEROBIC IDENTIFY: CPT | Performed by: FAMILY MEDICINE

## 2023-10-22 LAB — BACTERIA UR CULT: ABNORMAL

## 2023-11-03 ENCOUNTER — LAB REQUISITION (OUTPATIENT)
Dept: LAB | Facility: HOSPITAL | Age: 78
End: 2023-11-03
Payer: MEDICARE

## 2023-11-03 DIAGNOSIS — E11.9 TYPE 2 DIABETES MELLITUS WITHOUT COMPLICATIONS: ICD-10-CM

## 2023-11-03 DIAGNOSIS — I50.21 ACUTE SYSTOLIC (CONGESTIVE) HEART FAILURE: ICD-10-CM

## 2023-11-03 LAB
ALBUMIN SERPL-MCNC: 3.3 G/DL (ref 3.4–4.8)
ALBUMIN/GLOB SERPL: 1.5 RATIO (ref 1.1–2)
ALP SERPL-CCNC: 265 UNIT/L (ref 40–150)
ALT SERPL-CCNC: 22 UNIT/L (ref 0–55)
AST SERPL-CCNC: 21 UNIT/L (ref 5–34)
BASOPHILS # BLD AUTO: 0.06 X10(3)/MCL
BASOPHILS NFR BLD AUTO: 1.1 %
BILIRUB SERPL-MCNC: 1.1 MG/DL
BUN SERPL-MCNC: 11 MG/DL (ref 9.8–20.1)
CALCIUM SERPL-MCNC: 8.5 MG/DL (ref 8.4–10.2)
CHLORIDE SERPL-SCNC: 106 MMOL/L (ref 98–107)
CO2 SERPL-SCNC: 29 MMOL/L (ref 23–31)
CREAT SERPL-MCNC: 2.38 MG/DL (ref 0.55–1.02)
EOSINOPHIL # BLD AUTO: 0.55 X10(3)/MCL (ref 0–0.9)
EOSINOPHIL NFR BLD AUTO: 10.3 %
ERYTHROCYTE [DISTWIDTH] IN BLOOD BY AUTOMATED COUNT: 17 % (ref 11.5–17)
GFR SERPLBLD CREATININE-BSD FMLA CKD-EPI: 21 MLS/MIN/1.73/M2
GLOBULIN SER-MCNC: 2.2 GM/DL (ref 2.4–3.5)
GLUCOSE SERPL-MCNC: 97 MG/DL (ref 82–115)
HCT VFR BLD AUTO: 37.6 % (ref 37–47)
HGB BLD-MCNC: 11.6 G/DL (ref 12–16)
IMM GRANULOCYTES # BLD AUTO: 0.01 X10(3)/MCL (ref 0–0.04)
IMM GRANULOCYTES NFR BLD AUTO: 0.2 %
LACTATE SERPL-SCNC: 0.7 MMOL/L (ref 0.5–2.2)
LYMPHOCYTES # BLD AUTO: 0.77 X10(3)/MCL (ref 0.6–4.6)
LYMPHOCYTES NFR BLD AUTO: 14.4 %
MCH RBC QN AUTO: 31.7 PG (ref 27–31)
MCHC RBC AUTO-ENTMCNC: 30.9 G/DL (ref 33–36)
MCV RBC AUTO: 102.7 FL (ref 80–94)
MONOCYTES # BLD AUTO: 0.42 X10(3)/MCL (ref 0.1–1.3)
MONOCYTES NFR BLD AUTO: 7.9 %
NEUTROPHILS # BLD AUTO: 3.53 X10(3)/MCL (ref 2.1–9.2)
NEUTROPHILS NFR BLD AUTO: 66.1 %
PLATELET # BLD AUTO: 104 X10(3)/MCL (ref 130–400)
PMV BLD AUTO: 10.9 FL (ref 7.4–10.4)
POTASSIUM SERPL-SCNC: 3.9 MMOL/L (ref 3.5–5.1)
PROT SERPL-MCNC: 5.5 GM/DL (ref 5.8–7.6)
RBC # BLD AUTO: 3.66 X10(6)/MCL (ref 4.2–5.4)
SODIUM SERPL-SCNC: 143 MMOL/L (ref 136–145)
WBC # SPEC AUTO: 5.34 X10(3)/MCL (ref 4.5–11.5)

## 2023-11-03 PROCEDURE — 85025 COMPLETE CBC W/AUTO DIFF WBC: CPT | Performed by: FAMILY MEDICINE

## 2023-11-03 PROCEDURE — 83605 ASSAY OF LACTIC ACID: CPT | Performed by: FAMILY MEDICINE

## 2023-11-03 PROCEDURE — 80053 COMPREHEN METABOLIC PANEL: CPT | Performed by: FAMILY MEDICINE

## 2023-11-05 ENCOUNTER — LAB REQUISITION (OUTPATIENT)
Dept: LAB | Facility: HOSPITAL | Age: 78
End: 2023-11-05
Payer: MEDICARE

## 2023-11-05 DIAGNOSIS — N39.0 URINARY TRACT INFECTION, SITE NOT SPECIFIED: ICD-10-CM

## 2023-11-05 LAB
APPEARANCE UR: CLEAR
BACTERIA #/AREA URNS AUTO: ABNORMAL /HPF
BILIRUB UR QL STRIP.AUTO: ABNORMAL
COLOR UR AUTO: ABNORMAL
GLUCOSE UR QL STRIP.AUTO: NEGATIVE
KETONES UR QL STRIP.AUTO: ABNORMAL
LEUKOCYTE ESTERASE UR QL STRIP.AUTO: ABNORMAL
NITRITE UR QL STRIP.AUTO: NEGATIVE
PH UR STRIP.AUTO: 5 [PH]
PROT UR QL STRIP.AUTO: ABNORMAL
RBC #/AREA URNS AUTO: ABNORMAL /HPF
RBC UR QL AUTO: NEGATIVE
SP GR UR STRIP.AUTO: 1.02 (ref 1–1.03)
SQUAMOUS #/AREA URNS AUTO: ABNORMAL /HPF
UROBILINOGEN UR STRIP-ACNC: 1
WBC #/AREA URNS AUTO: ABNORMAL /HPF

## 2023-11-05 PROCEDURE — 87184 SC STD DISK METHOD PER PLATE: CPT | Performed by: FAMILY MEDICINE

## 2023-11-05 PROCEDURE — 87077 CULTURE AEROBIC IDENTIFY: CPT | Performed by: FAMILY MEDICINE

## 2023-11-05 PROCEDURE — 81001 URINALYSIS AUTO W/SCOPE: CPT | Performed by: FAMILY MEDICINE

## 2023-11-06 PROBLEM — J18.9 PNEUMONIA DUE TO INFECTIOUS ORGANISM: Status: RESOLVED | Noted: 2022-08-29 | Resolved: 2023-11-06

## 2023-11-08 LAB — BACTERIA UR CULT: ABNORMAL

## 2024-01-17 ENCOUNTER — HOSPITAL ENCOUNTER (INPATIENT)
Facility: HOSPITAL | Age: 79
LOS: 3 days | Discharge: HOME | DRG: 280 | End: 2024-01-20
Attending: EMERGENCY MEDICINE | Admitting: STUDENT IN AN ORGANIZED HEALTH CARE EDUCATION/TRAINING PROGRAM
Payer: MEDICARE

## 2024-01-17 DIAGNOSIS — I50.9 CONGESTIVE HEART FAILURE, UNSPECIFIED HF CHRONICITY, UNSPECIFIED HEART FAILURE TYPE: Primary | ICD-10-CM

## 2024-01-17 DIAGNOSIS — J11.1 INFLUENZA WITH UPPER RESPIRATORY SYMPTOMS: ICD-10-CM

## 2024-01-17 DIAGNOSIS — R06.02 SOB (SHORTNESS OF BREATH): ICD-10-CM

## 2024-01-17 DIAGNOSIS — I48.91 ATRIAL FIBRILLATION WITH RVR: ICD-10-CM

## 2024-01-17 DIAGNOSIS — E87.70 HYPERVOLEMIA, UNSPECIFIED HYPERVOLEMIA TYPE: ICD-10-CM

## 2024-01-17 DIAGNOSIS — R06.03 ACUTE RESPIRATORY DISTRESS: ICD-10-CM

## 2024-01-17 LAB
ALBUMIN SERPL-MCNC: 3.7 G/DL (ref 3.4–4.8)
ALBUMIN/GLOB SERPL: 1.3 RATIO (ref 1.1–2)
ALP SERPL-CCNC: 251 UNIT/L (ref 40–150)
ALT SERPL-CCNC: 20 UNIT/L (ref 0–55)
APTT PPP: 45.9 SECONDS (ref 23.2–33.7)
AST SERPL-CCNC: 26 UNIT/L (ref 5–34)
BASOPHILS # BLD AUTO: 0.12 X10(3)/MCL
BASOPHILS NFR BLD AUTO: 1.2 %
BILIRUB SERPL-MCNC: 0.9 MG/DL
BNP BLD-MCNC: 1040.5 PG/ML
BUN SERPL-MCNC: 43 MG/DL (ref 9.8–20.1)
CALCIUM SERPL-MCNC: 9.1 MG/DL (ref 8.4–10.2)
CHLORIDE SERPL-SCNC: 95 MMOL/L (ref 98–107)
CO2 SERPL-SCNC: 26 MMOL/L (ref 23–31)
CREAT SERPL-MCNC: 6.58 MG/DL (ref 0.55–1.02)
EOSINOPHIL # BLD AUTO: 0.25 X10(3)/MCL (ref 0–0.9)
EOSINOPHIL NFR BLD AUTO: 2.5 %
ERYTHROCYTE [DISTWIDTH] IN BLOOD BY AUTOMATED COUNT: 14.8 % (ref 11.5–17)
FLUAV AG UPPER RESP QL IA.RAPID: DETECTED
FLUBV AG UPPER RESP QL IA.RAPID: NOT DETECTED
GFR SERPLBLD CREATININE-BSD FMLA CKD-EPI: 6 MLS/MIN/1.73/M2
GLOBULIN SER-MCNC: 2.8 GM/DL (ref 2.4–3.5)
GLUCOSE SERPL-MCNC: 117 MG/DL (ref 82–115)
HBV SURFACE AG SERPL QL IA: NONREACTIVE
HCT VFR BLD AUTO: 34.7 % (ref 37–47)
HGB BLD-MCNC: 10.9 G/DL (ref 12–16)
IMM GRANULOCYTES # BLD AUTO: 0.05 X10(3)/MCL (ref 0–0.04)
IMM GRANULOCYTES NFR BLD AUTO: 0.5 %
INR PPP: 1.7
LYMPHOCYTES # BLD AUTO: 1.86 X10(3)/MCL (ref 0.6–4.6)
LYMPHOCYTES NFR BLD AUTO: 18.7 %
MAGNESIUM SERPL-MCNC: 2.3 MG/DL (ref 1.6–2.6)
MCH RBC QN AUTO: 31.6 PG (ref 27–31)
MCHC RBC AUTO-ENTMCNC: 31.4 G/DL (ref 33–36)
MCV RBC AUTO: 100.6 FL (ref 80–94)
MONOCYTES # BLD AUTO: 0.98 X10(3)/MCL (ref 0.1–1.3)
MONOCYTES NFR BLD AUTO: 9.8 %
NEUTROPHILS # BLD AUTO: 6.71 X10(3)/MCL (ref 2.1–9.2)
NEUTROPHILS NFR BLD AUTO: 67.3 %
NRBC BLD AUTO-RTO: 0 %
PLATELET # BLD AUTO: 150 X10(3)/MCL (ref 130–400)
PMV BLD AUTO: 10.1 FL (ref 7.4–10.4)
POCT GLUCOSE: 184 MG/DL (ref 70–110)
POTASSIUM SERPL-SCNC: 5.4 MMOL/L (ref 3.5–5.1)
PROT SERPL-MCNC: 6.5 GM/DL (ref 5.8–7.6)
PROTHROMBIN TIME: 19.6 SECONDS (ref 12.5–14.5)
RBC # BLD AUTO: 3.45 X10(6)/MCL (ref 4.2–5.4)
SARS-COV-2 RNA RESP QL NAA+PROBE: NOT DETECTED
SODIUM SERPL-SCNC: 135 MMOL/L (ref 136–145)
TROPONIN I SERPL-MCNC: 0.02 NG/ML (ref 0–0.04)
TROPONIN I SERPL-MCNC: 0.03 NG/ML (ref 0–0.04)
TROPONIN I SERPL-MCNC: 0.05 NG/ML (ref 0–0.04)
TSH SERPL-ACNC: 4.05 UIU/ML (ref 0.35–4.94)
WBC # SPEC AUTO: 9.97 X10(3)/MCL (ref 4.5–11.5)

## 2024-01-17 PROCEDURE — G0257 UNSCHED DIALYSIS ESRD PT HOS: HCPCS

## 2024-01-17 PROCEDURE — 94640 AIRWAY INHALATION TREATMENT: CPT

## 2024-01-17 PROCEDURE — 96376 TX/PRO/DX INJ SAME DRUG ADON: CPT

## 2024-01-17 PROCEDURE — 25000003 PHARM REV CODE 250: Performed by: EMERGENCY MEDICINE

## 2024-01-17 PROCEDURE — 80053 COMPREHEN METABOLIC PANEL: CPT | Performed by: STUDENT IN AN ORGANIZED HEALTH CARE EDUCATION/TRAINING PROGRAM

## 2024-01-17 PROCEDURE — 25000003 PHARM REV CODE 250: Performed by: STUDENT IN AN ORGANIZED HEALTH CARE EDUCATION/TRAINING PROGRAM

## 2024-01-17 PROCEDURE — 86706 HEP B SURFACE ANTIBODY: CPT | Performed by: INTERNAL MEDICINE

## 2024-01-17 PROCEDURE — 87340 HEPATITIS B SURFACE AG IA: CPT | Performed by: INTERNAL MEDICINE

## 2024-01-17 PROCEDURE — 63600175 PHARM REV CODE 636 W HCPCS: Performed by: EMERGENCY MEDICINE

## 2024-01-17 PROCEDURE — 21400001 HC TELEMETRY ROOM

## 2024-01-17 PROCEDURE — 85610 PROTHROMBIN TIME: CPT | Performed by: STUDENT IN AN ORGANIZED HEALTH CARE EDUCATION/TRAINING PROGRAM

## 2024-01-17 PROCEDURE — 83880 ASSAY OF NATRIURETIC PEPTIDE: CPT | Performed by: STUDENT IN AN ORGANIZED HEALTH CARE EDUCATION/TRAINING PROGRAM

## 2024-01-17 PROCEDURE — 96365 THER/PROPH/DIAG IV INF INIT: CPT

## 2024-01-17 PROCEDURE — 5A1D70Z PERFORMANCE OF URINARY FILTRATION, INTERMITTENT, LESS THAN 6 HOURS PER DAY: ICD-10-PCS | Performed by: INTERNAL MEDICINE

## 2024-01-17 PROCEDURE — 85025 COMPLETE CBC W/AUTO DIFF WBC: CPT | Performed by: STUDENT IN AN ORGANIZED HEALTH CARE EDUCATION/TRAINING PROGRAM

## 2024-01-17 PROCEDURE — 99291 CRITICAL CARE FIRST HOUR: CPT

## 2024-01-17 PROCEDURE — 84484 ASSAY OF TROPONIN QUANT: CPT | Performed by: PHYSICIAN ASSISTANT

## 2024-01-17 PROCEDURE — 25000242 PHARM REV CODE 250 ALT 637 W/ HCPCS: Performed by: EMERGENCY MEDICINE

## 2024-01-17 PROCEDURE — 83735 ASSAY OF MAGNESIUM: CPT | Performed by: STUDENT IN AN ORGANIZED HEALTH CARE EDUCATION/TRAINING PROGRAM

## 2024-01-17 PROCEDURE — 96367 TX/PROPH/DG ADDL SEQ IV INF: CPT

## 2024-01-17 PROCEDURE — 5A09357 ASSISTANCE WITH RESPIRATORY VENTILATION, LESS THAN 24 CONSECUTIVE HOURS, CONTINUOUS POSITIVE AIRWAY PRESSURE: ICD-10-PCS | Performed by: EMERGENCY MEDICINE

## 2024-01-17 PROCEDURE — 93005 ELECTROCARDIOGRAM TRACING: CPT

## 2024-01-17 PROCEDURE — 63600175 PHARM REV CODE 636 W HCPCS: Performed by: PHYSICIAN ASSISTANT

## 2024-01-17 PROCEDURE — 84484 ASSAY OF TROPONIN QUANT: CPT | Performed by: STUDENT IN AN ORGANIZED HEALTH CARE EDUCATION/TRAINING PROGRAM

## 2024-01-17 PROCEDURE — 0240U COVID/FLU A&B PCR: CPT | Performed by: STUDENT IN AN ORGANIZED HEALTH CARE EDUCATION/TRAINING PROGRAM

## 2024-01-17 PROCEDURE — 84443 ASSAY THYROID STIM HORMONE: CPT | Performed by: STUDENT IN AN ORGANIZED HEALTH CARE EDUCATION/TRAINING PROGRAM

## 2024-01-17 PROCEDURE — 93010 ELECTROCARDIOGRAM REPORT: CPT | Mod: ,,, | Performed by: INTERNAL MEDICINE

## 2024-01-17 PROCEDURE — 85730 THROMBOPLASTIN TIME PARTIAL: CPT | Performed by: STUDENT IN AN ORGANIZED HEALTH CARE EDUCATION/TRAINING PROGRAM

## 2024-01-17 PROCEDURE — 11000001 HC ACUTE MED/SURG PRIVATE ROOM

## 2024-01-17 PROCEDURE — 27000221 HC OXYGEN, UP TO 24 HOURS

## 2024-01-17 PROCEDURE — 96375 TX/PRO/DX INJ NEW DRUG ADDON: CPT

## 2024-01-17 RX ORDER — LEVOTHYROXINE SODIUM 50 UG/1
50 TABLET ORAL
Status: DISCONTINUED | OUTPATIENT
Start: 2024-01-18 | End: 2024-01-20 | Stop reason: HOSPADM

## 2024-01-17 RX ORDER — ACETAMINOPHEN 325 MG/1
650 TABLET ORAL EVERY 4 HOURS PRN
Status: DISCONTINUED | OUTPATIENT
Start: 2024-01-17 | End: 2024-01-20 | Stop reason: HOSPADM

## 2024-01-17 RX ORDER — LEVALBUTEROL INHALATION SOLUTION 0.63 MG/3ML
0.63 SOLUTION RESPIRATORY (INHALATION) EVERY 4 HOURS
Status: DISCONTINUED | OUTPATIENT
Start: 2024-01-17 | End: 2024-01-20

## 2024-01-17 RX ORDER — FUROSEMIDE 10 MG/ML
40 INJECTION INTRAMUSCULAR; INTRAVENOUS
Status: COMPLETED | OUTPATIENT
Start: 2024-01-17 | End: 2024-01-17

## 2024-01-17 RX ORDER — IBUPROFEN 200 MG
24 TABLET ORAL
Status: DISCONTINUED | OUTPATIENT
Start: 2024-01-17 | End: 2024-01-20 | Stop reason: HOSPADM

## 2024-01-17 RX ORDER — PANTOPRAZOLE SODIUM 40 MG/1
40 TABLET, DELAYED RELEASE ORAL DAILY
Status: DISCONTINUED | OUTPATIENT
Start: 2024-01-18 | End: 2024-01-20 | Stop reason: HOSPADM

## 2024-01-17 RX ORDER — GLUCAGON 1 MG
1 KIT INJECTION
Status: DISCONTINUED | OUTPATIENT
Start: 2024-01-17 | End: 2024-01-20 | Stop reason: HOSPADM

## 2024-01-17 RX ORDER — ENOXAPARIN SODIUM 150 MG/ML
1 INJECTION SUBCUTANEOUS
Status: DISCONTINUED | OUTPATIENT
Start: 2024-01-17 | End: 2024-01-20

## 2024-01-17 RX ORDER — OSELTAMIVIR PHOSPHATE 30 MG/1
30 CAPSULE ORAL
Status: DISCONTINUED | OUTPATIENT
Start: 2024-01-17 | End: 2024-01-20 | Stop reason: HOSPADM

## 2024-01-17 RX ORDER — OSELTAMIVIR PHOSPHATE 75 MG/1
75 CAPSULE ORAL 2 TIMES DAILY
Status: DISCONTINUED | OUTPATIENT
Start: 2024-01-17 | End: 2024-01-17

## 2024-01-17 RX ORDER — ATORVASTATIN CALCIUM 40 MG/1
40 TABLET, FILM COATED ORAL DAILY
Status: DISCONTINUED | OUTPATIENT
Start: 2024-01-18 | End: 2024-01-20 | Stop reason: HOSPADM

## 2024-01-17 RX ORDER — ONDANSETRON HYDROCHLORIDE 2 MG/ML
4 INJECTION, SOLUTION INTRAVENOUS EVERY 4 HOURS PRN
Status: DISCONTINUED | OUTPATIENT
Start: 2024-01-17 | End: 2024-01-20 | Stop reason: HOSPADM

## 2024-01-17 RX ORDER — ACETAMINOPHEN 500 MG
500 TABLET ORAL EVERY 6 HOURS PRN
Status: DISCONTINUED | OUTPATIENT
Start: 2024-01-17 | End: 2024-01-20 | Stop reason: HOSPADM

## 2024-01-17 RX ORDER — DILTIAZEM HYDROCHLORIDE 5 MG/ML
25 INJECTION INTRAVENOUS
Status: COMPLETED | OUTPATIENT
Start: 2024-01-17 | End: 2024-01-17

## 2024-01-17 RX ORDER — ALBUTEROL SULFATE 0.83 MG/ML
10 SOLUTION RESPIRATORY (INHALATION)
Status: COMPLETED | OUTPATIENT
Start: 2024-01-17 | End: 2024-01-17

## 2024-01-17 RX ORDER — SODIUM CHLORIDE 0.9 % (FLUSH) 0.9 %
10 SYRINGE (ML) INJECTION EVERY 12 HOURS PRN
Status: DISCONTINUED | OUTPATIENT
Start: 2024-01-17 | End: 2024-01-20 | Stop reason: HOSPADM

## 2024-01-17 RX ORDER — MIDODRINE HYDROCHLORIDE 5 MG/1
10 TABLET ORAL
Status: COMPLETED | OUTPATIENT
Start: 2024-01-17 | End: 2024-01-17

## 2024-01-17 RX ORDER — OSELTAMIVIR PHOSPHATE 75 MG/1
75 CAPSULE ORAL 2 TIMES DAILY
Qty: 10 CAPSULE | Refills: 0 | Status: SHIPPED | OUTPATIENT
Start: 2024-01-17 | End: 2024-01-17

## 2024-01-17 RX ORDER — HYDROCORTISONE ACETATE 25 MG/1
25 SUPPOSITORY RECTAL 2 TIMES DAILY PRN
Status: DISCONTINUED | OUTPATIENT
Start: 2024-01-17 | End: 2024-01-20 | Stop reason: HOSPADM

## 2024-01-17 RX ORDER — SELENIUM 50 MCG
1 TABLET ORAL
Status: DISCONTINUED | OUTPATIENT
Start: 2024-01-18 | End: 2024-01-20 | Stop reason: HOSPADM

## 2024-01-17 RX ORDER — ENOXAPARIN SODIUM 100 MG/ML
30 INJECTION SUBCUTANEOUS EVERY 24 HOURS
Status: DISCONTINUED | OUTPATIENT
Start: 2024-01-17 | End: 2024-01-17

## 2024-01-17 RX ORDER — ACETAMINOPHEN 325 MG/1
650 TABLET ORAL EVERY 8 HOURS PRN
Status: DISCONTINUED | OUTPATIENT
Start: 2024-01-17 | End: 2024-01-20 | Stop reason: HOSPADM

## 2024-01-17 RX ORDER — HYDROXYZINE PAMOATE 25 MG/1
25 CAPSULE ORAL 3 TIMES DAILY
Status: DISCONTINUED | OUTPATIENT
Start: 2024-01-17 | End: 2024-01-20 | Stop reason: HOSPADM

## 2024-01-17 RX ORDER — FLUTICASONE FUROATE AND VILANTEROL 200; 25 UG/1; UG/1
1 POWDER RESPIRATORY (INHALATION) DAILY
Status: DISCONTINUED | OUTPATIENT
Start: 2024-01-18 | End: 2024-01-20 | Stop reason: HOSPADM

## 2024-01-17 RX ORDER — FLUTICASONE PROPIONATE AND SALMETEROL 250; 50 UG/1; UG/1
1 POWDER RESPIRATORY (INHALATION) 2 TIMES DAILY
Status: DISCONTINUED | OUTPATIENT
Start: 2024-01-17 | End: 2024-01-17 | Stop reason: CLARIF

## 2024-01-17 RX ORDER — INSULIN ASPART 100 [IU]/ML
0-10 INJECTION, SOLUTION INTRAVENOUS; SUBCUTANEOUS
Status: DISCONTINUED | OUTPATIENT
Start: 2024-01-17 | End: 2024-01-20 | Stop reason: HOSPADM

## 2024-01-17 RX ORDER — MAGNESIUM SULFATE HEPTAHYDRATE 40 MG/ML
2 INJECTION, SOLUTION INTRAVENOUS
Status: COMPLETED | OUTPATIENT
Start: 2024-01-17 | End: 2024-01-17

## 2024-01-17 RX ORDER — MUPIROCIN 20 MG/G
OINTMENT TOPICAL 2 TIMES DAILY
Status: DISCONTINUED | OUTPATIENT
Start: 2024-01-17 | End: 2024-01-20 | Stop reason: HOSPADM

## 2024-01-17 RX ORDER — IBUPROFEN 200 MG
16 TABLET ORAL
Status: DISCONTINUED | OUTPATIENT
Start: 2024-01-17 | End: 2024-01-20 | Stop reason: HOSPADM

## 2024-01-17 RX ADMIN — DILTIAZEM HYDROCHLORIDE 15 MG/HR: 5 INJECTION INTRAVENOUS at 08:01

## 2024-01-17 RX ADMIN — MIDODRINE HYDROCHLORIDE 10 MG: 5 TABLET ORAL at 11:01

## 2024-01-17 RX ADMIN — ALBUTEROL SULFATE 10 MG: 2.5 SOLUTION RESPIRATORY (INHALATION) at 10:01

## 2024-01-17 RX ADMIN — ENOXAPARIN SODIUM 120 MG: 120 INJECTION SUBCUTANEOUS at 04:01

## 2024-01-17 RX ADMIN — HYDROXYZINE PAMOATE 25 MG: 25 CAPSULE ORAL at 09:01

## 2024-01-17 RX ADMIN — DILTIAZEM HYDROCHLORIDE 25 MG: 5 INJECTION, SOLUTION INTRAVENOUS at 10:01

## 2024-01-17 RX ADMIN — DILTIAZEM HYDROCHLORIDE 5 MG/HR: 5 INJECTION INTRAVENOUS at 11:01

## 2024-01-17 RX ADMIN — INSULIN ASPART 2 UNITS: 100 INJECTION, SOLUTION INTRAVENOUS; SUBCUTANEOUS at 04:01

## 2024-01-17 RX ADMIN — OSELTAMIVIR PHOSPHATE 30 MG: 30 CAPSULE ORAL at 09:01

## 2024-01-17 RX ADMIN — MAGNESIUM SULFATE HEPTAHYDRATE 2 G: 40 INJECTION, SOLUTION INTRAVENOUS at 10:01

## 2024-01-17 RX ADMIN — FUROSEMIDE 40 MG: 10 INJECTION, SOLUTION INTRAVENOUS at 11:01

## 2024-01-17 NOTE — CONSULTS
Nephrology  Inpatient consult to Nephrology  Consult performed by: Liam Patton MD  Consult ordered by: Ashleigh Michael MD        Chief Complaint   Patient presents with    Shortness of Breath     EMS reports SOB and cough, initial heart rate 190 given 5 mg Metoprolol and DuoNeb in route.      HPI: 80 yo female consulted for dialysis needs.  She has h/o ESRD on HD with [facility] ([facility]), CHF, asthma, paroxysmal atrial fibrillation, obese, DM II, HTN, HLD, presented to the ED today via EMS for SOB x3 days with cough.  In ED she tested + for influenza, was given nebs, Solumedrol, 5 mg Metoprolol, and 250 ml NS en route for HR of 190 on scene; CXR c/w pulmonary edema, venous congestion though no effusion noted.   She was started on cardizem drip and Tamiflu in ED with midodrine resumed.  She was admitted in November at [facility] for vol overload as she was unable to remove UF at outpt dialysis due to chronic hypotension, lost 50 lbs with UF on daily dialysis with wt down from 340' to 285' on d/c home.  Today, she notes cough / SOB, no CP/N/V, + anxious     Review of Systems   Constitutional:  Positive for fatigue.   HENT: Negative.     Respiratory:  Positive for cough and shortness of breath.    Cardiovascular: Negative.    Gastrointestinal: Negative.    Genitourinary: Negative.    Musculoskeletal: Negative.    Neurological:  Positive for weakness.   Psychiatric/Behavioral:  The patient is nervous/anxious.       All other systems negative except for HPI      Past Medical History:   Diagnosis Date    Anxiety disorder, unspecified     Chronic kidney disease, unspecified     Congestive heart failure (CHF)     Depression     Diabetes mellitus     GERD (gastroesophageal reflux disease)     History of anemia due to chronic kidney disease     Hypercholesterolemia     Hypertension     Hypothyroidism, unspecified     Parkinsonism     Paroxysmal atrial fibrillation    ESRD on HD MWF [facility] ([facility])      Past Surgical History:   Procedure Laterality Date    CHOLECYSTECTOMY      Dialysis shunt Left     HYSTERECTOMY          Family History   Problem Relation Age of Onset    Hypertension Mother     Heart disease Mother     Hypertension Father     Heart disease Father     Heart disease Sister     Hypertension Sister         Social History     Socioeconomic History    Marital status:    Tobacco Use    Smoking status: Never     Passive exposure: Past    Smokeless tobacco: Never   Substance and Sexual Activity    Alcohol use: Not Currently    Drug use: Never     Social Determinants of Health     Financial Resource Strain: Patient Declined (8/22/2023)    Overall Financial Resource Strain (CARDIA)     Difficulty of Paying Living Expenses: Patient declined   Food Insecurity: Patient Declined (8/22/2023)    Hunger Vital Sign     Worried About Running Out of Food in the Last Year: Patient declined     Ran Out of Food in the Last Year: Patient declined   Transportation Needs: Patient Declined (8/22/2023)    PRAPARE - Transportation     Lack of Transportation (Medical): Patient declined     Lack of Transportation (Non-Medical): Patient declined   Physical Activity: Patient Declined (8/22/2023)    Exercise Vital Sign     Days of Exercise per Week: Patient declined     Minutes of Exercise per Session: Patient declined   Stress: Patient Declined (8/22/2023)    [employer] - Occupational Stress Questionnaire     Feeling of Stress : Patient declined   Social Connections: Patient Declined (8/22/2023)    Social Connection and Isolation Panel [NHANES]     Frequency of Communication with Friends and Family: Patient declined     Frequency of Social Gatherings with Friends and Family: Patient declined     Attends Restorationist Services: Patient declined     Active Member of Clubs or Organizations: Patient declined     Attends Club or Organization Meetings: Patient declined     Marital Status: Patient  declined   Housing Stability: Unknown (8/22/2023)    Housing Stability Vital Sign     Unable to Pay for Housing in the Last Year: Patient refused     Unstable Housing in the Last Year: Patient refused        Review of patient's allergies indicates:   Allergen Reactions    Codeine Nausea And Vomiting    Cortisone Nausea Only and Nausea And Vomiting     only allergic to oral  only allergic to oral      Lisinopril Nausea And Vomiting and Rash    Morphine Itching        Current Outpatient Medications   Medication Instructions    acetaminophen (TYLENOL) 500 mg, Oral, Every 6 hours PRN    albuterol-ipratropium (DUO-NEB) 2.5 mg-0.5 mg/3 mL nebulizer solution 3 mLs, Nebulization, Every 6 hours while awake, Rescue    atorvastatin (LIPITOR) 40 mg, Oral, Daily    bacitracin 500 unit/gram ointment Topical (Top), 2 times daily, BOTH NOSTRILS    budesonide (PULMICORT) 0.5 mg, Nebulization, Every 12 hours, Controller    diazePAM (VALIUM) 5 mg, Oral, 2 times daily    ELIQUIS 2.5 mg, Oral, 2 times daily    fluoride, sodium, (PREVIDENT 5000 PLUS) 1.1 % Crea PreviDent 5000 Plus    fluticasone propionate (FLONASE) 50 mcg/actuation nasal spray 1 spray in each nostril Nasally twice a day for 30 days    fluticasone-salmeterol diskus inhaler 250-50 mcg 1 puff, Inhalation, 2 times daily, Controller    gabapentin (NEURONTIN) 300 mg, Oral, 3 times daily    glycerin adult suppository 1 suppository, Rectal, Daily, MWF BEFORE DIALYSIS    glycerin/min oil/polycarbophil (REPLENS VAGL) 1 Application    HUMULIN 70/30 U-100 INSULIN 100 unit/mL (70-30) injection 15 Units, Subcutaneous, 2 times daily    hydrocortisone (ANUSOL-HC) 25 mg suppository 1 suppository    hydrocortisone (ANUSOL-HC) 25 mg, Rectal, 2 times daily    hydroxyzine pamoate (VISTARIL ORAL) 25 mg, Oral, 3 times daily    insulin regular human (HUMULIN R) 100 unit/mL (3 mL) InPn pen inject 4-10 units into the skin 2 times daily before meals. 200-250 4 units, 251-300: 6 units, 301-350: 8  units, 351-400: 10 units, 401> notify MD- Subcutaneous Injection    insulin regular 4-10 Units, Subcutaneous, 2 times daily before meals, 200-250: 4 units 251-300: 6 units 301-350: 8 units 351-400: 10 units 401 > notify MD    lactobacillus acidophilus & bulgar (LACTINEX) 100 million cell packet 1 tablet, Oral, Daily    Lactobacillus acidophilus 500 million cell Cap 1 capsule, Oral, Daily    Lactoperoxi/Gluc Oxid/Pot Thio (BIOTENE DRY MOUTH MM) Biotene Dry Mouth    levothyroxine (SYNTHROID) 200 mcg, Oral, Daily    levothyroxine (SYNTHROID) 50 mcg, Oral, Before breakfast    loratadine (CLARITIN) 10 mg, Oral, Daily    metOLazone (ZAROXOLYN) 5 MG tablet 1 tablet Orally Once a day    metoprolol succinate (TOPROL-XL) 25 MG 24 hr tablet 1 tablet Orally twice a day    multivitamin with folic acid 400 mcg Tab 1 tablet, Oral, Daily    nystatin (MYCOSTATIN) 100,000 unit/mL suspension 7.5 ml Mouth/Throat twice a day    ondansetron (ZOFRAN) 4 MG tablet 1 tablet Orally every 6 hours    pantoprazole (PROTONIX) 40 mg, Oral, Daily    saliva substitute combo no.9 (BIOTENE DRY MOUTH ORAL RINSE MM) 15 mLs, Mucous Membrane, 2 times daily    torsemide (DEMADEX) 20 mg, Oral, Daily    traMADoL (ULTRAM) 50 mg, Oral, 3 times daily    zaleplon (SONATA) 10 mg, Oral, Nightly     Objective   VITAL SIGNS: 24 HR MIN & MAX LAST    Temp  Min: 97.5 °F (36.4 °C)  Max: 97.5 °F (36.4 °C)  97.5 °F (36.4 °C)        BP  Min: 92/57  Max: 107/44  98/61     Pulse  Min: 111  Max: 140  (!) 111     Resp  Min: 18  Max: 24  (!) 21    SpO2  Min: 88 %  Max: 98 %  (!) 88 %      Wt Readings from Last 3 Encounters:   01/17/24 124.7 kg (275 lb)   09/23/23 (!) 139.3 kg (307 lb)   08/21/23 134.7 kg (297 lb)      Intake/Output - Last 3 Shifts       None           Physical Exam  Constitutional:       General: She is not in acute distress.  Cardiovascular:      Rate and Rhythm: Tachycardia present.      Comments: LUE AVF good thrill  Pulmonary:      Effort: No respiratory  distress.      Breath sounds: Rhonchi and rales present.   Abdominal:      General: Bowel sounds are normal.      Palpations: Abdomen is soft.      Tenderness: There is no abdominal tenderness.   Musculoskeletal:         General: Swelling (1+, appears at baseline) present.      Cervical back: Normal range of motion.   Neurological:      Mental Status: She is alert.      Motor: Weakness present.          Recent Results (from the past 24 hour(s))   COVID/FLU A&B PCR    Collection Time: 01/17/24  9:38 AM   Result Value Ref Range    Influenza A PCR Detected (A) Not Detected    Influenza B PCR Not Detected Not Detected    SARS-CoV-2 PCR Not Detected Not Detected, Negative   Comprehensive metabolic panel    Collection Time: 01/17/24  9:47 AM   Result Value Ref Range    Sodium Level 135 (L) 136 - 145 mmol/L    Potassium Level 5.4 (H) 3.5 - 5.1 mmol/L    Chloride 95 (L) 98 - 107 mmol/L    Carbon Dioxide 26 23 - 31 mmol/L    Glucose Level 117 (H) 82 - 115 mg/dL    Blood Urea Nitrogen 43.0 (H) 9.8 - 20.1 mg/dL    Creatinine 6.58 (H) 0.55 - 1.02 mg/dL    Calcium Level Total 9.1 8.4 - 10.2 mg/dL    Protein Total 6.5 5.8 - 7.6 gm/dL    Albumin Level 3.7 3.4 - 4.8 g/dL    Globulin 2.8 2.4 - 3.5 gm/dL    Albumin/Globulin Ratio 1.3 1.1 - 2.0 ratio    Bilirubin Total 0.9 <=1.5 mg/dL    Alkaline Phosphatase 251 (H) 40 - 150 unit/L    Alanine Aminotransferase 20 0 - 55 unit/L    Aspartate Aminotransferase 26 5 - 34 unit/L    eGFR 6 mls/min/1.73/m2   Brain natriuretic peptide    Collection Time: 01/17/24  9:47 AM   Result Value Ref Range    Natriuretic Peptide 1,040.5 (H) <=100.0 pg/mL   APTT    Collection Time: 01/17/24  9:47 AM   Result Value Ref Range    PTT 45.9 (H) 23.2 - 33.7 seconds   Protime-INR    Collection Time: 01/17/24  9:47 AM   Result Value Ref Range    PT 19.6 (H) 12.5 - 14.5 seconds    INR 1.7 (H) <=1.3   Troponin I    Collection Time: 01/17/24  9:47 AM   Result Value Ref Range    Troponin-I 0.047 (H) 0.000 - 0.045  ng/mL   TSH    Collection Time: 01/17/24  9:47 AM   Result Value Ref Range    TSH 4.050 0.350 - 4.940 uIU/mL   Magnesium    Collection Time: 01/17/24  9:47 AM   Result Value Ref Range    Magnesium Level 2.30 1.60 - 2.60 mg/dL   CBC with Differential    Collection Time: 01/17/24  9:47 AM   Result Value Ref Range    WBC 9.97 4.50 - 11.50 x10(3)/mcL    RBC 3.45 (L) 4.20 - 5.40 x10(6)/mcL    Hgb 10.9 (L) 12.0 - 16.0 g/dL    Hct 34.7 (L) 37.0 - 47.0 %    .6 (H) 80.0 - 94.0 fL    MCH 31.6 (H) 27.0 - 31.0 pg    MCHC 31.4 (L) 33.0 - 36.0 g/dL    RDW 14.8 11.5 - 17.0 %    Platelet 150 130 - 400 x10(3)/mcL    MPV 10.1 7.4 - 10.4 fL    Neut % 67.3 %    Lymph % 18.7 %    Mono % 9.8 %    Eos % 2.5 %    Basophil % 1.2 %    Lymph # 1.86 0.6 - 4.6 x10(3)/mcL    Neut # 6.71 2.1 - 9.2 x10(3)/mcL    Mono # 0.98 0.1 - 1.3 x10(3)/mcL    Eos # 0.25 0 - 0.9 x10(3)/mcL    Baso # 0.12 <=0.2 x10(3)/mcL    IG# 0.05 (H) 0 - 0.04 x10(3)/mcL    IG% 0.5 %    NRBC% 0.0 %   Hepatitis B Surface Antigen    Collection Time: 01/17/24  9:47 AM   Result Value Ref Range    Hepatitis B Surface Antigen Nonreactive Nonreactive      X-Ray Chest AP Portable    Result Date: 1/17/2024  EXAMINATION: XR CHEST AP PORTABLE CLINICAL HISTORY: Shortness of breath TECHNIQUE: Single frontal view of the chest was performed. COMPARISON: 09/23/2023 FINDINGS: There is pulmonary edema and pulmonary venous congestion bilaterally.  There is no pleural effusion..  The heart is enlarged in size.  Aorta is unremarkable.  The bones and joints show no acute abnormality.     Increased volume status/CHF Electronically signed by: Fuad Ramos Date:    01/17/2024 Time:    10:06     ASSESSMENT PLAN    ESRD, hyperkalemia - will continue routine MWF HD via [facility] while inpt with UF as tolerated using Critline to challenge dry weight  CHF, chronic hypotension - midodrine resumed, UF as tolerated on HD.  EF normal on echo in November  Influenza - O2, nebs, tamiflu renally dosed,  steroids as per primary  AF / RVR - cardizem drip, IV Mg++ given, cardiology consulted  Anemia -Hb at goal, monitor  DM2 - mngt as per primary

## 2024-01-17 NOTE — H&P
[facility] Medicine History & Physical Examination       Patient Name: Danae Sanchez  MRN: [id number]  Patient Class: IP- Inpatient   Admission Date: 1/17/2024   Admitting Physician: Lew Garcia MD   Length of Stay: 0  Attending Physician: Lew Garcia MD   Primary Care Provider: Ludin Velasco MD  Face-to-Face encounter date: 01/17/2024  Code Status: Full Code   Chief Complaint: Shortness of Breath (EMS reports SOB and cough, initial heart rate 190 given 5 mg Metoprolol and DuoNeb in route. )        Patient information was obtained from patient, patient's family, past medical records and ER records.     HISTORY OF PRESENT ILLNESS:   Danae Sanchez is a 79 y.o. White female with a past medical history of hypertension, hyperlipidemia, paroxysmal atrial fibrillation on Eliquis, congestive heart failure (> 50 % LVEF in November 2023), ESRD on HD MWF, anemia chronic disease, hypothyroidism, diabetes mellitus type 2, anxiety/depression, Parkinson's disease and on CPAP all the time. The patient presented to [facility] on 1/17/2024 with a primary complaint of shortness of breath for the last since yesterday (01/16/2024).  Patient reports having an intermittent cough with sputum production which is orange.  She denies complaints of abdominal pain, nausea, vomiting, diarrhea and chest pain.  Her last full session of dialysis was on 01/15/2024.    Upon EMS arrival patient's heart rate was 190. In route she received albuterol x3, Atrovent x1, 125 mg of Solu-Medrol, 5 mg of metoprolol and 250 mL of normal saline. Upon presentation to the ED, temperature 97.5° F, heart rate 140, blood pressure 107/44, respiratory rate 22 and SpO2 94% on 8 L. Labs WBC 9.97, H&H 10.9/34.7, .6, potassium 5.4, BUN/creatinine 43/6.58, glucose 117, alkaline phosphatase 251, BNP 1040.5, troponin 0.047.  Influenza A positive.  Influenza B and SARS-CoV-2 PCR negative.  EKG atrial fibrillation with rapid  ventricular rate of 136.  In ED patient received albuterol nebulizer, 40 mg of IV Lasix, Tamiflu, 2 g of magnesium sulfate and 25 mg of Cardizem.  Patient was admitted to hospital medicine services for further medical management.    PAST MEDICAL HISTORY:     Past Medical History:   Diagnosis Date    Anxiety disorder, unspecified     ESRD on HD     Congestive heart failure (CHF)     Depression     Diabetes mellitus     GERD (gastroesophageal reflux disease)     History of anemia due to chronic kidney disease     Hypercholesterolemia     Hypertension     Hypothyroidism, unspecified     Parkinsonism     Paroxysmal atrial fibrillation        PAST SURGICAL HISTORY:     Past Surgical History:   Procedure Laterality Date    CHOLECYSTECTOMY      Dialysis shunt Left     HYSTERECTOMY         ALLERGIES:   Codeine, Cortisone, Lisinopril, and Morphine    FAMILY HISTORY:   Reviewed and negative    SOCIAL HISTORY:   Denies tobacco, alcohol or illicit drug use    HOME MEDICATIONS:     Prior to Admission medications    Medication Sig Start Date End Date Taking? Authorizing Provider   acetaminophen (TYLENOL) 500 MG tablet Take 1 tablet (500 mg total) by mouth every 6 (six) hours as needed. 9/1/22   Ludin Velasco MD   albuterol-ipratropium (DUO-NEB) 2.5 mg-0.5 mg/3 mL nebulizer solution Take 3 mLs by nebulization every 6 (six) hours while awake. Rescue 9/1/22 9/1/23  Ludin Velasco MD   atorvastatin (LIPITOR) 40 MG tablet Take 40 mg by mouth Daily. 11/30/21   Provider, Historical   bacitracin 500 unit/gram ointment Apply topically 2 (two) times daily. BOTH NOSTRILS    Provider, Historical   budesonide (PULMICORT) 0.5 mg/2 mL nebulizer solution Take 2 mLs (0.5 mg total) by nebulization every 12 (twelve) hours. Controller 9/1/22 9/1/23  Ludin Velasco MD   diazePAM (VALIUM) 5 MG tablet Take 1 tablet (5 mg total) by mouth 2 (two) times daily. 8/22/23 9/21/23  Ludin Velasco MD   ELIQUIS 2.5 mg Tab Take 2.5 mg  by mouth 2 (two) times daily. 8/5/22   Provider, Historical   fluoride, sodium, (PREVIDENT 5000 PLUS) 1.1 % Crea PreviDent 5000 Plus    Provider, Historical   fluticasone propionate (FLONASE) 50 mcg/actuation nasal spray 1 spray in each nostril Nasally twice a day for 30 days 8/15/23   Provider, Historical   fluticasone-salmeterol diskus inhaler 250-50 mcg Inhale 1 puff into the lungs 2 (two) times daily. Controller    Provider, Historical   gabapentin (NEURONTIN) 300 MG capsule Take 300 mg by mouth 3 (three) times daily.    Provider, Historical   glycerin adult suppository Place 1 suppository rectally once daily. MWF BEFORE DIALYSIS    Provider, Historical   glycerin/min oil/polycarbophil (REPLENS VAGL) 1 Application.    Provider, Historical   HUMULIN 70/30 U-100 INSULIN 100 unit/mL (70-30) injection Inject 15 Units into the skin 2 (two) times a day. 8/27/22   Provider, Historical   hydrocortisone (ANUSOL-HC) 25 mg suppository 1 suppository. 8/15/23   Provider, Historical   hydrocortisone (ANUSOL-HC) 25 mg suppository Place 25 mg rectally 2 (two) times daily.    Provider, Historical   hydroxyzine pamoate (VISTARIL ORAL) Take 25 mg by mouth 3 (three) times daily.    Provider, Historical   insulin regular 100 unit/mL Inj injection Inject 4-10 Units into the skin 2 (two) times daily before meals. 200-250: 4 units 251-300: 6 units 301-350: 8 units 351-400: 10 units 401 > notify MD 2/7/22   Provider, Historical   insulin regular human (HUMULIN R) 100 unit/mL (3 mL) InPn pen inject 4-10 units into the skin 2 times daily before meals. 200-250 4 units, 251-300: 6 units, 301-350: 8 units, 351-400: 10 units, 401> notify MD- Subcutaneous Injection    Provider, Historical   lactobacillus acidophilus & bulgar (LACTINEX) 100 million cell packet Take 1 tablet by mouth once daily.    Provider, Historical   Lactobacillus acidophilus 500 million cell Cap Take 1 capsule by mouth Daily. 2/7/22   Provider, Historical   Lactoperoxi/Gluc  Oxid/Pot Thio (BIOTENE DRY MOUTH MM) Biotene Dry Mouth    Provider, Historical   levothyroxine (SYNTHROID) 200 MCG tablet Take 200 mcg by mouth once daily. 8/15/22   Provider, Historical   levothyroxine (SYNTHROID) 50 MCG tablet Take 50 mcg by mouth before breakfast.    Provider, Historical   loratadine (CLARITIN) 10 mg tablet Take 10 mg by mouth once daily. 11/30/21   Provider, Historical   metOLazone (ZAROXOLYN) 5 MG tablet 1 tablet Orally Once a day 2/7/22   Provider, Historical   metoprolol succinate (TOPROL-XL) 25 MG 24 hr tablet 1 tablet Orally twice a day    Provider, Historical   multivitamin with folic acid 400 mcg Tab Take 1 tablet by mouth once daily.    Provider, Historical   nystatin (MYCOSTATIN) 100,000 unit/mL suspension 7.5 ml Mouth/Throat twice a day    Provider, Historical   ondansetron (ZOFRAN) 4 MG tablet 1 tablet Orally every 6 hours    Provider, Historical   pantoprazole (PROTONIX) 40 MG tablet Take 40 mg by mouth once daily. 8/23/22   Provider, Historical   saliva substitute combo no.9 (BIOTENE DRY MOUTH ORAL RINSE MM) 15 mLs by Mucous Membrane route 2 (two) times a day.    Provider, Historical   torsemide (DEMADEX) 20 MG Tab Take 20 mg by mouth once daily. 7/20/22   Provider, Historical   traMADoL (ULTRAM) 50 mg tablet Take 50 mg by mouth 3 (three) times daily.    Provider, Historical   zaleplon (SONATA) 10 MG capsule Take 10 mg by mouth every evening. 7/29/22   Provider, Historical   oseltamivir (TAMIFLU) 75 MG capsule Take 1 capsule (75 mg total) by mouth 2 (two) times daily. for 5 days 1/17/24 1/17/24  Ashleigh Michael MD       REVIEW OF SYSTEMS:   Except as documented, all other systems reviewed and negative     PHYSICAL EXAM:     VITAL SIGNS: 24 HRS MIN & MAX LAST   Temp  Min: 97.5 °F (36.4 °C)  Max: 97.5 °F (36.4 °C) 97.5 °F (36.4 °C)   BP  Min: 92/57  Max: 107/44 98/61   Pulse  Min: 111  Max: 140  (!) 111   Resp  Min: 18  Max: 24 (!) 21   SpO2  Min: 88 %  Max: 98 % (!) 88 %        General appearance: Well-developed, well-nourished female in no apparent distress.  No family  at bedside.  Undergoing dialysis.  HEENT: Atraumatic head. Moist mucous membranes of oral cavity.  Lungs: Clear to auscultation bilaterally anteriorly.  On CPAP.  Heart: Irregular rate and rhythm.   Abdomen: Soft, non-distended..   Extremities: No cyanosis, clubbing. No deformities.  Skin: No Rash. Warm and dry.  Bilateral lower extremities with erythema, warmth and trace edema.  Neuro:  Lethargic, awakens to answer questions. Motor and sensory exams grossly intact.    LABS AND IMAGING:     Recent Labs   Lab 01/17/24  0947   WBC 9.97   RBC 3.45*   HGB 10.9*   HCT 34.7*   .6*   MCH 31.6*   MCHC 31.4*   RDW 14.8      MPV 10.1       Recent Labs   Lab 01/17/24  0947   *   K 5.4*   CO2 26   BUN 43.0*   CREATININE 6.58*   CALCIUM 9.1   MG 2.30   ALBUMIN 3.7   ALKPHOS 251*   ALT 20   AST 26   BILITOT 0.9       Microbiology Results (last 7 days)       ** No results found for the last 168 hours. **             X-Ray Chest AP Portable  Narrative: EXAMINATION:  XR CHEST AP PORTABLE    CLINICAL HISTORY:  Shortness of breath    TECHNIQUE:  Single frontal view of the chest was performed.    COMPARISON:  09/23/2023    FINDINGS:  There is pulmonary edema and pulmonary venous congestion bilaterally.  There is no pleural effusion..  The heart is enlarged in size.  Aorta is unremarkable.  The bones and joints show no acute abnormality.  Impression: Increased volume status/CHF    Electronically signed by: Fuad Ramos  Date:    01/17/2024  Time:    10:06        ASSESSMENT & PLAN:   Assessment:  Volume overload secondary to ESRD on HD and congestive failure exacerbation  Atrial fibrillation with rapid ventricular rate, on Eliquis   Influenza A  Anemia chronic disease, stable   Hyperkalemia  ESRD on HD MWF  Elevated alkaline phosphatase   NSTEMI type 2 likely secondary to volume overload  History of hypertension,  hyperlipidemia, hypothyroidism, diabetes mellitus type 2, anxiety/depression, Parkinson's disease     Plan:  - Nephrology consulted for dialysis.  Appreciate recommendations   - Cardiology consulted.  Appreciate recommendations  - Telemetry monitoring   - Trend troponin   - Continue with Tamiflu   - Continue with supplemental oxygen weaning as tolerated  - Accu-Cheks and sliding scale  - Resume appropriate home medications when deemed necessary   - Labs in AM      VTE Prophylaxis: will be placed on Lovenox for DVT prophylaxis and will be advised to be as mobile as possible and sit in a chair as tolerated      __________________________________________________________________________  INPATIENT LIST OF MEDICATIONS     Current Facility-Administered Medications:     acetaminophen tablet 650 mg, 650 mg, Oral, Q8H PRN, Demetria North, PA-C    acetaminophen tablet 650 mg, 650 mg, Oral, Q4H PRN, Demetria North, PA-EDUARDO    dextrose 10% bolus 125 mL 125 mL, 12.5 g, Intravenous, PRN, Demetria North, PA-C    dextrose 10% bolus 250 mL 250 mL, 25 g, Intravenous, PRN, Demetria North PA-EDUARDO    diltiaZEM 125 mg in dextrose 5 % 125 mL IVPB (ready to mix) (titrating), 0-15 mg/hr, Intravenous, Continuous, Ashleigh Michael MD, Last Rate: 10 mL/hr at 01/17/24 1245, 10 mg/hr at 01/17/24 1245    enoxaparin injection 30 mg, 30 mg, Subcutaneous, Daily, Demetria North, PA-EDUARDO    glucagon (human recombinant) injection 1 mg, 1 mg, Intramuscular, PRN, Demetria North, PA-EDUARDO    glucose chewable tablet 16 g, 16 g, Oral, PRN, Demetria North, PA-EDUARDO    glucose chewable tablet 24 g, 24 g, Oral, PRN, Demetria North, PA-C    ondansetron injection 4 mg, 4 mg, Intravenous, Q4H PRN, NorthDemetria diamond PA-C    oseltamivir capsule 30 mg, 30 mg, Oral, Q48H, Lew Garcia MD    sodium chloride 0.9% flush 10 mL, 10 mL, Intravenous, Q12H PRN, Demetria North PA-C    Current Outpatient Medications:     acetaminophen (TYLENOL) 500 MG tablet, Take  1 tablet (500 mg total) by mouth every 6 (six) hours as needed., Disp: , Rfl: 0    albuterol-ipratropium (DUO-NEB) 2.5 mg-0.5 mg/3 mL nebulizer solution, Take 3 mLs by nebulization every 6 (six) hours while awake. Rescue, Disp: 75 mL, Rfl: 0    atorvastatin (LIPITOR) 40 MG tablet, Take 40 mg by mouth Daily., Disp: , Rfl:     bacitracin 500 unit/gram ointment, Apply topically 2 (two) times daily. BOTH NOSTRILS, Disp: , Rfl:     budesonide (PULMICORT) 0.5 mg/2 mL nebulizer solution, Take 2 mLs (0.5 mg total) by nebulization every 12 (twelve) hours. Controller, Disp: , Rfl: 0    diazePAM (VALIUM) 5 MG tablet, Take 1 tablet (5 mg total) by mouth 2 (two) times daily., Disp: 60 tablet, Rfl: 0    ELIQUIS 2.5 mg Tab, Take 2.5 mg by mouth 2 (two) times daily., Disp: , Rfl:     fluoride, sodium, (PREVIDENT 5000 PLUS) 1.1 % Crea, PreviDent 5000 Plus, Disp: , Rfl:     fluticasone propionate (FLONASE) 50 mcg/actuation nasal spray, 1 spray in each nostril Nasally twice a day for 30 days, Disp: , Rfl:     fluticasone-salmeterol diskus inhaler 250-50 mcg, Inhale 1 puff into the lungs 2 (two) times daily. Controller, Disp: , Rfl:     gabapentin (NEURONTIN) 300 MG capsule, Take 300 mg by mouth 3 (three) times daily., Disp: , Rfl:     glycerin adult suppository, Place 1 suppository rectally once daily. MWF BEFORE DIALYSIS, Disp: , Rfl:     glycerin/min oil/polycarbophil (REPLENS VAGL), 1 Application., Disp: , Rfl:     HUMULIN 70/30 U-100 INSULIN 100 unit/mL (70-30) injection, Inject 15 Units into the skin 2 (two) times a day., Disp: , Rfl:     hydrocortisone (ANUSOL-HC) 25 mg suppository, 1 suppository., Disp: , Rfl:     hydrocortisone (ANUSOL-HC) 25 mg suppository, Place 25 mg rectally 2 (two) times daily., Disp: , Rfl:     hydroxyzine pamoate (VISTARIL ORAL), Take 25 mg by mouth 3 (three) times daily., Disp: , Rfl:     insulin regular 100 unit/mL Inj injection, Inject 4-10 Units into the skin 2 (two) times daily before meals.  200-250: 4 units 251-300: 6 units 301-350: 8 units 351-400: 10 units 401 > notify MD, Disp: , Rfl:     insulin regular human (HUMULIN R) 100 unit/mL (3 mL) InPn pen, inject 4-10 units into the skin 2 times daily before meals. 200-250 4 units, 251-300: 6 units, 301-350: 8 units, 351-400: 10 units, 401> notify MD- Subcutaneous Injection, Disp: , Rfl:     lactobacillus acidophilus & bulgar (LACTINEX) 100 million cell packet, Take 1 tablet by mouth once daily., Disp: , Rfl:     Lactobacillus acidophilus 500 million cell Cap, Take 1 capsule by mouth Daily., Disp: , Rfl:     Lactoperoxi/Gluc Oxid/Pot Thio (BIOTENE DRY MOUTH MM), Biotene Dry Mouth, Disp: , Rfl:     levothyroxine (SYNTHROID) 200 MCG tablet, Take 200 mcg by mouth once daily., Disp: , Rfl:     levothyroxine (SYNTHROID) 50 MCG tablet, Take 50 mcg by mouth before breakfast., Disp: , Rfl:     loratadine (CLARITIN) 10 mg tablet, Take 10 mg by mouth once daily., Disp: , Rfl:     metOLazone (ZAROXOLYN) 5 MG tablet, 1 tablet Orally Once a day, Disp: , Rfl:     metoprolol succinate (TOPROL-XL) 25 MG 24 hr tablet, 1 tablet Orally twice a day, Disp: , Rfl:     multivitamin with folic acid 400 mcg Tab, Take 1 tablet by mouth once daily., Disp: , Rfl:     nystatin (MYCOSTATIN) 100,000 unit/mL suspension, 7.5 ml Mouth/Throat twice a day, Disp: , Rfl:     ondansetron (ZOFRAN) 4 MG tablet, 1 tablet Orally every 6 hours, Disp: , Rfl:     pantoprazole (PROTONIX) 40 MG tablet, Take 40 mg by mouth once daily., Disp: , Rfl:     saliva substitute combo no.9 (BIOTENE DRY MOUTH ORAL RINSE MM), 15 mLs by Mucous Membrane route 2 (two) times a day., Disp: , Rfl:     torsemide (DEMADEX) 20 MG Tab, Take 20 mg by mouth once daily., Disp: , Rfl:     traMADoL (ULTRAM) 50 mg tablet, Take 50 mg by mouth 3 (three) times daily., Disp: , Rfl:     zaleplon (SONATA) 10 MG capsule, Take 10 mg by mouth every evening., Disp: , Rfl:       Scheduled Meds:   enoxparin  30 mg Subcutaneous Daily     oseltamivir  30 mg Oral Q48H     Continuous Infusions:   dilTIAZem 10 mg/hr (01/17/24 1245)     PRN Meds:.acetaminophen, acetaminophen, dextrose 10%, dextrose 10%, glucagon (human recombinant), glucose, glucose, ondansetron, sodium chloride 0.9%      Discharge Planning and Disposition: Anticipated discharge to be determined.    I, FRANDY Zhang, have reviewed and discussed the case with Dr. Lew Garcia MD    Please see the following addendum for further assessment and plan from there attending MD.    Demetria North PA-C  01/17/2024      Seen and examined the patient. Agree with above history, physical exam and management.  Presenting with Afib RVR, SOB and flu.  Details of the history as above.   Denies abdominal symptoms, n/v and fever or chills.   Ox3 with moving all 4 extremities. Denies sob, cp and dizziness. Sleeps with CPAP machine from home.     Cxr is negative for infiltrates and wbc is nl. No pneumonia at this time.    - Continue temiflu  - Afib RVR on cardizem drip  - ECHO.   - Cards consult and nephrology consult.   - Needs HD.     CC diagnosis: Afib RVR,   - CC time was given 45 min

## 2024-01-17 NOTE — PROGRESS NOTES
Pharmacist Renal Dose Adjustment Note    Danae Sanchez is a 79 y.o. female being treated with the medication enoxaparin    Patient Data:    Vital Signs (Most Recent):  Temp: 97.5 °F (36.4 °C) (01/17/24 0924)  Pulse: (!) 111 (01/17/24 1222)  Resp: (!) 21 (01/17/24 1222)  BP: (!) 93/58 (01/17/24 1415)  SpO2: (!) 88 % (01/17/24 1222) Vital Signs (72h Range):  Temp:  [97.5 °F (36.4 °C)]   Pulse:  [111-140]   Resp:  [18-24]   BP: ()/(44-61)   SpO2:  [88 %-98 %]      Recent Labs   Lab 01/17/24  0947   CREATININE 6.58*     Serum creatinine: 6.58 mg/dL (H) 01/17/24 0947  Estimated creatinine clearance: 9.8 mL/min (A)    Enoxaparin 1 mg/kg SQ BID will be changed to enoxaparin 1 mg/kg SQ Q24H based on patient's eCrCl < 30 mL/min per pharmacy protocol.    Pharmacist's Name: Ethel Riddle  Pharmacist's Extension: [phone]

## 2024-01-17 NOTE — PROGRESS NOTES
Pharmacist Renal Dose Adjustment Note    Danae Sanchez is a 79 y.o. female being treated with the medication oseltamivir    Patient Data:    Vital Signs (Most Recent):  Temp: 97.5 °F (36.4 °C) (01/17/24 0924)  Pulse: (!) 140 (01/17/24 1004)  Resp: (!) 24 (01/17/24 1004)  BP: 98/61 (01/17/24 1004)  SpO2: 95 % (01/17/24 1004) Vital Signs (72h Range):  Temp:  [97.5 °F (36.4 °C)]   Pulse:  [118-140]   Resp:  [18-24]   BP: ()/(44-61)   SpO2:  [94 %-98 %]      Recent Labs   Lab 01/17/24 0947   CREATININE 6.58*     Serum creatinine: 6.58 mg/dL (H) 01/17/24 0947  Estimated creatinine clearance: 9.8 mL/min (A)    Oseltamivir 75 mg BID will be changed to 30 mg every other day    Pharmacist's Name: Velia Jansen  Pharmacist's Extension: [phone]

## 2024-01-17 NOTE — NURSING
01/17/24 1705        Hemodialysis AV Fistula Left upper arm   No placement date or time found.   Present Prior to Hospital Arrival?: Yes  Location: Left upper arm   Site Assessment Clean;Dry;Intact   Patency Present;Thrill;Bruit   Status Deaccessed   Dressing Status Clean;Dry;Intact   Dressing Gauze   Post-Hemodialysis Assessment   Rinseback Volume (mL) 250 mL   Blood Volume Processed (Liters) 53.6 L   Dialyzer Clearance Moderately streaked   Duration of Treatment 180 minutes   Total UF (mL) 4000 mL   Net Fluid Removal 3500   Patient Response to Treatment Tolerated treatment well.   Post-Hemodialysis Comments Blood rinsed back per P&P.

## 2024-01-17 NOTE — ED PROVIDER NOTES
Encounter Date: 1/17/2024    SCRIBE #1 NOTE: I, Lizbeth Gerard, am scribing for, and in the presence of,  Ashleigh Michael MD. I have scribed the following portions of the note - the EKG reading. Other sections scribed: HPI, ROS, PE, MDM.       History     Chief Complaint   Patient presents with    Shortness of Breath     EMS reports SOB and cough, initial heart rate 190 given 5 mg Metoprolol and DuoNeb in route.      79 Y.O. female with a history of asthma, paroxysmal atrial fibrillation, CHF, DM II, HTN, HLD, and ESRD (dialysis MWF) presents to the ED via EMS for SOB onset 3 days. Pt also complains of cough. Reports slight relief after treatments given en route. She denies hx of COPD and smoking. Pt's PCP is Ludin Velasco MD.     Per EMS: Administered Albuterol x3, Atrovent x1, 125 mg Solumedrol, 5 mg Metoprolol, and 250 ml NS en route. Initial HR of 190 on scene.         The history is provided by the patient.   Shortness of Breath  This is a new problem. The current episode started more than 2 days ago. Associated symptoms include cough. Associated medical issues include asthma and heart failure. Associated medical issues do not include COPD.     Review of patient's allergies indicates:   Allergen Reactions    Codeine Nausea And Vomiting    Cortisone Nausea Only and Nausea And Vomiting     only allergic to oral  only allergic to oral      Lisinopril Nausea And Vomiting and Rash    Morphine Itching     Past Medical History:   Diagnosis Date    Anxiety disorder, unspecified     Chronic kidney disease, unspecified     Congestive heart failure (CHF)     Depression     Diabetes mellitus     GERD (gastroesophageal reflux disease)     History of anemia due to chronic kidney disease     Hypercholesterolemia     Hypertension     Hypothyroidism, unspecified     Parkinsonism     Paroxysmal atrial fibrillation      Past Surgical History:   Procedure Laterality Date    CHOLECYSTECTOMY      Dialysis shunt Left      HYSTERECTOMY       Family History   Problem Relation Age of Onset    Hypertension Mother     Heart disease Mother     Hypertension Father     Heart disease Father     Heart disease Sister     Hypertension Sister      Social History     Tobacco Use    Smoking status: Never     Passive exposure: Past    Smokeless tobacco: Never   Substance Use Topics    Alcohol use: Not Currently    Drug use: Never     Review of Systems   Respiratory:  Positive for cough and shortness of breath.        Physical Exam     Initial Vitals [01/17/24 0924]   BP Pulse Resp Temp SpO2   (!) 107/44 (!) 140 (!) 22 97.5 °F (36.4 °C) (!) 94 %      MAP       --         Physical Exam    Nursing note and vitals reviewed.  Constitutional: She appears well-developed and well-nourished. She is not diaphoretic. She does not appear ill. No distress.   HENT:   Head: Normocephalic and atraumatic.   Right Ear: External ear normal.   Left Ear: External ear normal.   Mouth/Throat: Oropharynx is clear and moist.   Eyes: Conjunctivae are normal.   Neck: Neck supple. No tracheal deviation present.   Cardiovascular:  Normal heart sounds. An irregular rhythm present.   Tachycardia present.         No murmur heard.  Pulmonary/Chest: Tachypnea noted. She is in respiratory distress (mild). She has wheezes (significant) in the right upper field, the right middle field, the right lower field, the left upper field, the left middle field and the left lower field. She has no rhonchi. She has no rales.   Abdominal: Abdomen is soft. She exhibits no distension. There is no abdominal tenderness.   No right CVA tenderness.  No left CVA tenderness.   Musculoskeletal:         General: Normal range of motion.      Cervical back: Neck supple.     Neurological: She is alert and oriented to person, place, and time. GCS score is 15. GCS eye subscore is 4. GCS verbal subscore is 5. GCS motor subscore is 6.   Skin: Skin is warm and dry. Capillary refill takes less than 2 seconds. No  rash noted. No pallor.         ED Course   Critical Care    Date/Time: 1/17/2024 12:46 PM    Performed by: Ashleigh Michael MD  Authorized by: Ashleigh Michael MD  Direct patient critical care time: 20 minutes  Additional history critical care time: 5 minutes  Ordering / reviewing critical care time: 5 minutes  Documentation critical care time: 5 minutes  Consulting other physicians critical care time: 5 minutes  Total critical care time (exclusive of procedural time) : 40 minutes  Critical care time was exclusive of separately billable procedures and treating other patients.  Critical care was necessary to treat or prevent imminent or life-threatening deterioration of the following conditions: cardiac failure, dehydration, metabolic crisis, circulatory failure, respiratory failure, renal failure and sepsis.  Critical care was time spent personally by me on the following activities: discussions with consultants, blood draw for specimens, development of treatment plan with patient or surrogate, interpretation of cardiac output measurements, evaluation of patient's response to treatment, examination of patient, obtaining history from patient or surrogate, ordering and performing treatments and interventions, ordering and review of laboratory studies, ordering and review of radiographic studies, re-evaluation of patient's condition, review of old charts and pulse oximetry.        Labs Reviewed   COMPREHENSIVE METABOLIC PANEL - Abnormal; Notable for the following components:       Result Value    Sodium Level 135 (*)     Potassium Level 5.4 (*)     Chloride 95 (*)     Glucose Level 117 (*)     Blood Urea Nitrogen 43.0 (*)     Creatinine 6.58 (*)     Alkaline Phosphatase 251 (*)     All other components within normal limits   B-TYPE NATRIURETIC PEPTIDE - Abnormal; Notable for the following components:    Natriuretic Peptide 1,040.5 (*)     All other components within normal limits   APTT - Abnormal; Notable for the  following components:    PTT 45.9 (*)     All other components within normal limits   PROTIME-INR - Abnormal; Notable for the following components:    PT 19.6 (*)     INR 1.7 (*)     All other components within normal limits   TROPONIN I - Abnormal; Notable for the following components:    Troponin-I 0.047 (*)     All other components within normal limits   COVID/FLU A&B PCR - Abnormal; Notable for the following components:    Influenza A PCR Detected (*)     All other components within normal limits    Narrative:     The Xpert Xpress SARS-CoV-2/FLU/RSV plus is a rapid, multiplexed real-time PCR test intended for the simultaneous qualitative detection and differentiation of SARS-CoV-2, Influenza A, Influenza B, and respiratory syncytial virus (RSV) viral RNA in either nasopharyngeal swab or nasal swab specimens.         CBC WITH DIFFERENTIAL - Abnormal; Notable for the following components:    RBC 3.45 (*)     Hgb 10.9 (*)     Hct 34.7 (*)     .6 (*)     MCH 31.6 (*)     MCHC 31.4 (*)     IG# 0.05 (*)     All other components within normal limits   TSH - Normal   MAGNESIUM - Normal   CBC W/ AUTO DIFFERENTIAL    Narrative:     The following orders were created for panel order CBC auto differential.  Procedure                               Abnormality         Status                     ---------                               -----------         ------                     CBC with Differential[[id number]]       Abnormal            Final result                 Please view results for these tests on the individual orders.   HEPATITIS B SURFACE ANTIGEN   HEPATITIS B SURFACE ANTIBODY, QUAL/QUANT     EKG Readings: (Independently Interpreted)   Initial Reading: No STEMI. Rhythm: Atrial Fibrillation. Heart Rate: 139. Ectopy: No Ectopy. Conduction: Normal. ST Segments: Normal ST Segments. T Waves: Normal. Axis: Left Axis Deviation. Clinical Impression: Atrial Fibrillation with RVR   EKG performed at 0928 on 1/17/2024.        Imaging Results              X-Ray Chest AP Portable (Final result)  Result time 01/17/24 10:06:49      Final result by Luisa Ramos MD (01/17/24 10:06:49)                   Impression:      Increased volume status/CHF      Electronically signed by: Fuad Ramos  Date:    01/17/2024  Time:    10:06               Narrative:    EXAMINATION:  XR CHEST AP PORTABLE    CLINICAL HISTORY:  Shortness of breath    TECHNIQUE:  Single frontal view of the chest was performed.    COMPARISON:  09/23/2023    FINDINGS:  There is pulmonary edema and pulmonary venous congestion bilaterally.  There is no pleural effusion..  The heart is enlarged in size.  Aorta is unremarkable.  The bones and joints show no acute abnormality.                                    X-Rays:   Independently Interpreted Readings:   Chest X-Ray: Cardiomegaly present.  Increased vascular markings consistent with CHF are present.     Medications   diltiaZEM 125 mg in dextrose 5 % 125 mL IVPB (ready to mix) (titrating) (10 mg/hr Intravenous Rate/Dose Change 1/17/24 1245)   oseltamivir capsule 30 mg (has no administration in time range)   sodium chloride 0.9% flush 10 mL (has no administration in time range)   ondansetron injection 4 mg (has no administration in time range)   acetaminophen tablet 650 mg (has no administration in time range)   acetaminophen tablet 650 mg (has no administration in time range)   glucose chewable tablet 16 g (has no administration in time range)   glucose chewable tablet 24 g (has no administration in time range)   glucagon (human recombinant) injection 1 mg (has no administration in time range)   enoxaparin injection 30 mg (has no administration in time range)   dextrose 10% bolus 125 mL 125 mL (has no administration in time range)   dextrose 10% bolus 250 mL 250 mL (has no administration in time range)   albuterol nebulizer solution 10 mg (10 mg Nebulization Given 1/17/24 1004)   magnesium sulfate 2g in water 50mL  IVPB (premix) (0 g Intravenous Stopped 1/17/24 1020)   diltiaZEM injection 25 mg (25 mg Intravenous Given 1/17/24 1004)   midodrine tablet 10 mg (10 mg Oral Given 1/17/24 1156)   furosemide injection 40 mg (40 mg Intravenous Given 1/17/24 1151)     Medical Decision Making  80 yo female with shortness of breath    DDX includes but not limited to ACS, pneumonia, COVID/Flu, congestive heart failure, asthma, COPD, pleural effusion, pulmonary edema, acute bronchitis, PE, pneumothorax, hemothorax, aortic dissection, electrolyte abnormalities, anemia, anxiety       ED management  1 hour long albuterol neb in ED   2g Mag IV  Cardizem bolus with improvement of HR but required drip for continued HR control  CXR with mild pulmonary edema   Given lasix, still makes urine   Positive for flu A  Mild troponin and BNP elevation and chronic creatinine elevation   Placed on BiPap   Admitted to hospitalist   [facility] consulted- agree with Cardizem         Problems Addressed:  Acute respiratory distress: acute illness or injury that poses a threat to life or bodily functions  Atrial fibrillation with RVR: acute illness or injury that poses a threat to life or bodily functions  Hypervolemia, unspecified hypervolemia type: acute illness or injury that poses a threat to life or bodily functions  Influenza with upper respiratory symptoms: acute illness or injury that poses a threat to life or bodily functions  SOB (shortness of breath): acute illness or injury that poses a threat to life or bodily functions    Amount and/or Complexity of Data Reviewed  Independent Historian: EMS     Details: Administered Albuterol x3, Atrovent x1, 125 mg Solumedrol, 5 mg Metoprolol, and 250 ml NS en route. Initial HR of 190 on scene.     External Data Reviewed: notes.     Details: Reviewed last hospital admission which was 11/06/2023- patient presented with altered mental status, CT head was normal, chest x-ray showed vascular congestion, she would ultrasound  venous and arterial of her legs which were negative.  Her MRI brain was negative.  Her CTA head neck was negative.  She was admitted for metabolic encephalopathy with hyperkalemia, bilateral lower extremity cellulitis it RSV positive.  Her cellulitis was treated with Rocephin in [facility].  They increased her midodrine it started her on Cardizem.  Her echo had a dilated left ventricle with lower limits of normal function.  She lost greater than 50 lb of fluid removal with dialysis.  Labs: ordered. Decision-making details documented in ED Course.  Radiology: ordered and independent interpretation performed. Decision-making details documented in ED Course.  ECG/medicine tests: ordered and independent interpretation performed. Decision-making details documented in ED Course.    Risk  Prescription drug management.  Decision regarding hospitalization.            Scribe Attestation:   Scribe #1: I performed the above scribed service and the documentation accurately describes the services I performed. I attest to the accuracy of the note.    Attending Attestation:           Physician Attestation for Scribe:  Physician Attestation Statement for Scribe #1: I, Ashleigh Michael MD, reviewed documentation, as scribed by Lizbeth Gerard in my presence, and it is both accurate and complete.             ED Course as of 01/17/24 1249   Wed Jan 17, 2024   1033 Patient improving, currently receiving neb treatment. Cardizem improved HR to 110s, will place on Cardizem drip. Patient with overload, she takes Lasix and makes some urine so will give Lasix IV here  [KM]   1201 [facility] paged. [AS]   1212 Cardiology  consult: spoke with Juan Alberto North NP who recs continue Cardizem drip  [KM]      ED Course User Index  [AS] Marychuy Gerard  [KM] Ashleigh Michael MD                           Clinical Impression:  Final diagnoses:  [R06.02] SOB (shortness of breath)  [J11.1] Influenza with upper respiratory symptoms  [R06.03] Acute respiratory distress  (Primary)  [E87.70] Hypervolemia, unspecified hypervolemia type  [I48.91] Atrial fibrillation with RVR          ED Disposition Condition    Admit Stable                Ashleigh Michael MD  01/17/24 8186

## 2024-01-18 LAB
ALBUMIN SERPL-MCNC: 3.2 G/DL (ref 3.4–4.8)
ALBUMIN/GLOB SERPL: 0.9 RATIO (ref 1.1–2)
ALP SERPL-CCNC: 209 UNIT/L (ref 40–150)
ALT SERPL-CCNC: 21 UNIT/L (ref 0–55)
AST SERPL-CCNC: 24 UNIT/L (ref 5–34)
BASOPHILS # BLD AUTO: 0.04 X10(3)/MCL
BASOPHILS NFR BLD AUTO: 0.4 %
BILIRUB SERPL-MCNC: 0.8 MG/DL
BUN SERPL-MCNC: 33.3 MG/DL (ref 9.8–20.1)
CALCIUM SERPL-MCNC: 9.3 MG/DL (ref 8.4–10.2)
CHLORIDE SERPL-SCNC: 94 MMOL/L (ref 98–107)
CO2 SERPL-SCNC: 24 MMOL/L (ref 23–31)
CREAT SERPL-MCNC: 5.01 MG/DL (ref 0.55–1.02)
EOSINOPHIL # BLD AUTO: 0.17 X10(3)/MCL (ref 0–0.9)
EOSINOPHIL NFR BLD AUTO: 1.6 %
ERYTHROCYTE [DISTWIDTH] IN BLOOD BY AUTOMATED COUNT: 14.8 % (ref 11.5–17)
GFR SERPLBLD CREATININE-BSD FMLA CKD-EPI: 8 MLS/MIN/1.73/M2
GLOBULIN SER-MCNC: 3.4 GM/DL (ref 2.4–3.5)
GLUCOSE SERPL-MCNC: 217 MG/DL (ref 82–115)
HBV SURFACE AB SER QL IA: NEGATIVE
HBV SURFACE AB SERPL IA-ACNC: <5 MIU/ML
HCT VFR BLD AUTO: 32.7 % (ref 37–47)
HGB BLD-MCNC: 10.2 G/DL (ref 12–16)
IMM GRANULOCYTES # BLD AUTO: 0.04 X10(3)/MCL (ref 0–0.04)
IMM GRANULOCYTES NFR BLD AUTO: 0.4 %
LYMPHOCYTES # BLD AUTO: 0.53 X10(3)/MCL (ref 0.6–4.6)
LYMPHOCYTES NFR BLD AUTO: 5 %
MCH RBC QN AUTO: 31.3 PG (ref 27–31)
MCHC RBC AUTO-ENTMCNC: 31.2 G/DL (ref 33–36)
MCV RBC AUTO: 100.3 FL (ref 80–94)
MONOCYTES # BLD AUTO: 0.31 X10(3)/MCL (ref 0.1–1.3)
MONOCYTES NFR BLD AUTO: 2.9 %
NEUTROPHILS # BLD AUTO: 9.53 X10(3)/MCL (ref 2.1–9.2)
NEUTROPHILS NFR BLD AUTO: 89.7 %
NRBC BLD AUTO-RTO: 0 %
PHOSPHATE SERPL-MCNC: 5.4 MG/DL (ref 2.3–4.7)
PLATELET # BLD AUTO: 140 X10(3)/MCL (ref 130–400)
PMV BLD AUTO: 10.5 FL (ref 7.4–10.4)
POCT GLUCOSE: 197 MG/DL (ref 70–110)
POCT GLUCOSE: 220 MG/DL (ref 70–110)
POTASSIUM SERPL-SCNC: 5 MMOL/L (ref 3.5–5.1)
PROT SERPL-MCNC: 6.6 GM/DL (ref 5.8–7.6)
RBC # BLD AUTO: 3.26 X10(6)/MCL (ref 4.2–5.4)
SODIUM SERPL-SCNC: 133 MMOL/L (ref 136–145)
TROPONIN I SERPL-MCNC: 0.02 NG/ML (ref 0–0.04)
WBC # SPEC AUTO: 10.62 X10(3)/MCL (ref 4.5–11.5)

## 2024-01-18 PROCEDURE — 94761 N-INVAS EAR/PLS OXIMETRY MLT: CPT

## 2024-01-18 PROCEDURE — 94640 AIRWAY INHALATION TREATMENT: CPT

## 2024-01-18 PROCEDURE — 84484 ASSAY OF TROPONIN QUANT: CPT | Performed by: PHYSICIAN ASSISTANT

## 2024-01-18 PROCEDURE — 99900035 HC TECH TIME PER 15 MIN (STAT)

## 2024-01-18 PROCEDURE — 80053 COMPREHEN METABOLIC PANEL: CPT | Performed by: PHYSICIAN ASSISTANT

## 2024-01-18 PROCEDURE — 27000221 HC OXYGEN, UP TO 24 HOURS

## 2024-01-18 PROCEDURE — 25000003 PHARM REV CODE 250: Performed by: PHYSICIAN ASSISTANT

## 2024-01-18 PROCEDURE — 25000003 PHARM REV CODE 250: Performed by: INTERNAL MEDICINE

## 2024-01-18 PROCEDURE — 25000242 PHARM REV CODE 250 ALT 637 W/ HCPCS: Performed by: STUDENT IN AN ORGANIZED HEALTH CARE EDUCATION/TRAINING PROGRAM

## 2024-01-18 PROCEDURE — 63600175 PHARM REV CODE 636 W HCPCS: Mod: JZ | Performed by: INTERNAL MEDICINE

## 2024-01-18 PROCEDURE — 84100 ASSAY OF PHOSPHORUS: CPT | Performed by: INTERNAL MEDICINE

## 2024-01-18 PROCEDURE — 25000003 PHARM REV CODE 250: Performed by: EMERGENCY MEDICINE

## 2024-01-18 PROCEDURE — 63600175 PHARM REV CODE 636 W HCPCS: Performed by: PHYSICIAN ASSISTANT

## 2024-01-18 PROCEDURE — 90935 HEMODIALYSIS ONE EVALUATION: CPT

## 2024-01-18 PROCEDURE — 27000207 HC ISOLATION

## 2024-01-18 PROCEDURE — 85025 COMPLETE CBC W/AUTO DIFF WBC: CPT | Performed by: PHYSICIAN ASSISTANT

## 2024-01-18 PROCEDURE — 25000003 PHARM REV CODE 250: Performed by: STUDENT IN AN ORGANIZED HEALTH CARE EDUCATION/TRAINING PROGRAM

## 2024-01-18 PROCEDURE — 21400001 HC TELEMETRY ROOM

## 2024-01-18 RX ORDER — POTASSIUM CHLORIDE 20 MEQ/15ML
20 SOLUTION ORAL DAILY
COMMUNITY

## 2024-01-18 RX ORDER — LACTULOSE 10 G/15ML
30 SOLUTION ORAL EVERY 6 HOURS
Status: DISPENSED | OUTPATIENT
Start: 2024-01-18 | End: 2024-01-18

## 2024-01-18 RX ORDER — MIDODRINE HYDROCHLORIDE 5 MG/1
10 TABLET ORAL 2 TIMES DAILY WITH MEALS
COMMUNITY

## 2024-01-18 RX ORDER — FUROSEMIDE 40 MG/1
40 TABLET ORAL 2 TIMES DAILY
COMMUNITY

## 2024-01-18 RX ORDER — MELOXICAM 7.5 MG/1
7.5 TABLET ORAL 2 TIMES DAILY
Status: ON HOLD | COMMUNITY
End: 2024-01-20 | Stop reason: HOSPADM

## 2024-01-18 RX ADMIN — DILTIAZEM HYDROCHLORIDE 5 MG/HR: 5 INJECTION INTRAVENOUS at 09:01

## 2024-01-18 RX ADMIN — ACETAMINOPHEN 650 MG: 325 TABLET, FILM COATED ORAL at 10:01

## 2024-01-18 RX ADMIN — LEVALBUTEROL HYDROCHLORIDE 0.63 MG: 0.63 SOLUTION RESPIRATORY (INHALATION) at 01:01

## 2024-01-18 RX ADMIN — INSULIN ASPART 1 UNITS: 100 INJECTION, SOLUTION INTRAVENOUS; SUBCUTANEOUS at 09:01

## 2024-01-18 RX ADMIN — Medication 1 CAPSULE: at 09:01

## 2024-01-18 RX ADMIN — LACTULOSE 30 G: 10 SOLUTION ORAL at 12:01

## 2024-01-18 RX ADMIN — ERYTHROPOIETIN 20000 UNITS: 10000 INJECTION, SOLUTION INTRAVENOUS; SUBCUTANEOUS at 02:01

## 2024-01-18 RX ADMIN — Medication 1 CAPSULE: at 12:01

## 2024-01-18 RX ADMIN — LEVALBUTEROL HYDROCHLORIDE 0.63 MG: 0.63 SOLUTION RESPIRATORY (INHALATION) at 12:01

## 2024-01-18 RX ADMIN — PANTOPRAZOLE SODIUM 40 MG: 40 TABLET, DELAYED RELEASE ORAL at 09:01

## 2024-01-18 RX ADMIN — HYDROXYZINE PAMOATE 25 MG: 25 CAPSULE ORAL at 09:01

## 2024-01-18 RX ADMIN — LEVALBUTEROL HYDROCHLORIDE 0.63 MG: 0.63 SOLUTION RESPIRATORY (INHALATION) at 08:01

## 2024-01-18 RX ADMIN — HYDROXYZINE PAMOATE 25 MG: 25 CAPSULE ORAL at 02:01

## 2024-01-18 RX ADMIN — LEVALBUTEROL HYDROCHLORIDE 0.63 MG: 0.63 SOLUTION RESPIRATORY (INHALATION) at 04:01

## 2024-01-18 RX ADMIN — LINACLOTIDE 290 MCG: 145 CAPSULE, GELATIN COATED ORAL at 12:01

## 2024-01-18 RX ADMIN — DILTIAZEM HYDROCHLORIDE 10 MG/HR: 5 INJECTION INTRAVENOUS at 10:01

## 2024-01-18 RX ADMIN — LEVOTHYROXINE SODIUM 50 MCG: 50 TABLET ORAL at 05:01

## 2024-01-18 NOTE — PROVIDER PROGRESS NOTES - EMERGENCY DEPT.
Chief complaint: constipation    Interval History:  Pt looks much better today after HD yesterday with 4 L UF, appetite good only c/o constipation.  She notes cough gray sputum    Review of Systems   Constitutional: Negative.    HENT: Negative.     Respiratory:  Positive for cough.    Cardiovascular: Negative.    Gastrointestinal:  Positive for constipation.   Genitourinary: Negative.    Musculoskeletal: Negative.    Neurological:  Positive for weakness.   Psychiatric/Behavioral: Negative.        all else Neg     atorvastatin  40 mg Oral Daily    enoxparin  1 mg/kg (Dosing Weight) Subcutaneous Q24H    fluticasone furoate-vilanteroL  1 puff Inhalation Daily    hydrOXYzine pamoate  25 mg Oral TID    insulin NPH-insulin regular (70/30)  15 Units Subcutaneous BID    Lactobacillus acidophilus  1 capsule Oral TID WM    levalbuterol  0.63 mg Nebulization Q4H    levothyroxine  50 mcg Oral Before breakfast    mupirocin   Nasal BID    oseltamivir  30 mg Oral Q48H    pantoprazole  40 mg Oral Daily       Objective     VITAL SIGNS: 24 HR MIN & MAX LAST    Temp  Min: 97 °F (36.1 °C)  Max: 98.1 °F (36.7 °C)  98.1 °F (36.7 °C)    BP  Min: 73/39  Max: 119/65  (!) 92/57     Pulse  Min: 67  Max: 120  80     Resp  Min: 15  Max: 24  18    SpO2  Min: 86 %  Max: 96 %  96 %      Wt Readings from Last 3 Encounters:   01/17/24 124 kg (273 lb 5.9 oz)   09/23/23 (!) 139.3 kg (307 lb)   08/21/23 134.7 kg (297 lb)       Intake/Output Summary (Last 24 hours) at 1/18/2024 1151  Last data filed at 1/18/2024 0649  Gross per 24 hour   Intake 318.25 ml   Output 4000 ml   Net -3681.75 ml       Physical Exam  Constitutional:       General: She is not in acute distress.  Cardiovascular:      Rate and Rhythm: Normal rate.   Pulmonary:      Effort: Pulmonary effort is normal.      Breath sounds: Normal breath sounds.   Abdominal:      General: Bowel sounds are normal.      Palpations: Abdomen is soft.      Tenderness: There is no abdominal tenderness.    Musculoskeletal:         General: Swelling present.      Cervical back: Normal range of motion.   Neurological:      Mental Status: She is alert and oriented to person, place, and time.   Psychiatric:         Mood and Affect: Mood normal.          Recent Labs     01/17/24  0947 01/18/24  0159   * 133*   K 5.4* 5.0   CHLORIDE 95* 94*   CO2 26 24   BUN 43.0* 33.3*   CREATININE 6.58* 5.01*   GLUCOSE 117* 217*   CALCIUM 9.1 9.3   MG 2.30  --    PHOS  --  5.4*   ALBUMIN 3.7 3.2*      Recent Labs     01/17/24  0947 01/18/24  0159   WBC 9.97 10.62   HGB 10.9* 10.2*   HCT 34.7* 32.7*    140         Assessment & Plan     ESRD, hyperkalemia - she had 4 L UF easily removed on HD yesterday, will perform HD again today for additional fluid removal as tolerated.  Once euvolemic will resume routine MWF HD via [facility]   CHF, chronic hypotension - midodrine resumed, UF as tolerated on HD.  EF normal on echo in November  Influenza - nebs prn, tamiflu renally dosed, steroids as per primary  AF / RVR - cardizem, IV Mg++ given, cardiology consulted  Anemia -Hb down 10.2, give EPO 20K U x 1, monitor  Constipation - linzess, laculose  DM2 - mngt as per primary

## 2024-01-18 NOTE — CONSULTS
Cardiovascular Consultation    Patient Name: Danae Sanchez  Age: 79 y.o.  : 1945  MRN: [id number]  Admission Date: 2024  Primary Cardiologist: Kaitlin Taylor office  ?  Chief Complaint:   Chief Complaint   Patient presents with    Shortness of Breath     EMS reports SOB and cough, initial heart rate 190 given 5 mg Metoprolol and DuoNeb in route.        History of Present Illness:  Danae Sanchez is a 79 y.o. female with past medical history including hypertension, diabetes, ESRD on dialysis, lymphoma, and anemia.      Patient with multiple hospital admissions within the past 6 months.  She was admitted in 2023 with respiratory and heart failure.  Admitted once again in 2023 with altered mental status, RSV, volume overload.    Patient admitted to [facility] on 2024 with complaints of increasing shortness of breath.  She was noted to be in atrial fib with RVR on admission.  She is flu positive.  Troponin with nonspecific and very mild elevation, peak troponin 0.04 that has trended down to 0.01.  Chest x-ray with congestive changes.  She has been started on Tamiflu.  She was given Cardizem bolus for rate control.    Cardiology has been consulted for AFib RVR.  Patient remains in atrial fib at this time with rate control in the 70s.  Remains on supplemental oxygen therapy.  Volume status to be managed per Nephrology with dialysis.  No chest discomfort, dizziness, or palpitations.    Review of Systems:  Review of Systems - 12 point review of systems was performed and reviewed with the patient and was negative except as indicated in the History of Present Illness.    Health Status  Review of patient's allergies indicates:   Allergen Reactions    Codeine Nausea And Vomiting    Cortisone Nausea Only and Nausea And Vomiting     only allergic to oral  only allergic to oral      Lisinopril Nausea And Vomiting and Rash    Morphine Itching       Past Medical History:   Diagnosis  Date    Anxiety disorder, unspecified     Chronic kidney disease, unspecified     Congestive heart failure (CHF)     Depression     Diabetes mellitus     GERD (gastroesophageal reflux disease)     History of anemia due to chronic kidney disease     Hypercholesterolemia     Hypertension     Hypothyroidism, unspecified     Parkinsonism     Paroxysmal atrial fibrillation        Current Facility-Administered Medications   Medication Dose Route Frequency Provider Last Rate Last Admin    acetaminophen tablet 500 mg  500 mg Oral Q6H PRN Lew Garcia MD        acetaminophen tablet 650 mg  650 mg Oral Q8H PRN Demetria North, PA-C        acetaminophen tablet 650 mg  650 mg Oral Q4H PRN Demetria North, PA-C        atorvastatin tablet 40 mg  40 mg Oral Daily Lew Garcia MD        dextrose 10% bolus 125 mL 125 mL  12.5 g Intravenous PRN Demetria North, PA-C        dextrose 10% bolus 125 mL 125 mL  12.5 g Intravenous PRN Demetria North, PA-C        dextrose 10% bolus 250 mL 250 mL  25 g Intravenous PRN Demetria North, PA-C        dextrose 10% bolus 250 mL 250 mL  25 g Intravenous PRN Demetria North, PA-C        diltiaZEM 125 mg in dextrose 5 % 125 mL IVPB (ready to mix) (titrating)  0-15 mg/hr Intravenous Continuous Ashleigh Michael MD 5 mL/hr at 01/18/24 0636 5 mg/hr at 01/18/24 0636    enoxaparin injection 120 mg  1 mg/kg (Dosing Weight) Subcutaneous Q24H Demetria North, PA-C   120 mg at 01/17/24 1626    fluticasone furoate-vilanteroL 200-25 mcg/dose diskus inhaler 1 puff  1 puff Inhalation Daily Lew Garcia MD        glucagon (human recombinant) injection 1 mg  1 mg Intramuscular PRN Demetria North, PA-C        glucagon (human recombinant) injection 1 mg  1 mg Intramuscular PRN Demetria North, PA-C        glucose chewable tablet 16 g  16 g Oral PRN Demetria North, PA-C        glucose chewable tablet 16 g  16 g Oral PRN Demetria North, PA-C        glucose chewable tablet 24 g  24 g Oral PRN  Demetria North PA-C        glucose chewable tablet 24 g  24 g Oral PRN Demetria North PA-C        hydrocortisone suppository 25 mg  25 mg Rectal BID PRN Lew Garcia MD        hydrOXYzine pamoate capsule 25 mg  25 mg Oral TID Lew Garcia MD   25 mg at 01/17/24 2139    insulin aspart U-100 injection 0-10 Units  0-10 Units Subcutaneous QID (AC + HS) PRN Demetria North PA-C   2 Units at 01/17/24 1628    insulin NPH-insulin regular (70/30) injection 15 Units  15 Units Subcutaneous BID Lew Garcia MD        Lactobacillus acidophilus capsule 1 capsule  1 capsule Oral TID WM Lew Garcia MD        levalbuterol nebulizer solution 0.63 mg  0.63 mg Nebulization Q4H Lew Garcia MD   0.63 mg at 01/18/24 0421    levothyroxine tablet 50 mcg  50 mcg Oral Before breakfast Lew Garcia MD   50 mcg at 01/18/24 0500    mupirocin 2 % ointment   Nasal BID Lew Garcia MD        ondansetron injection 4 mg  4 mg Intravenous Q4H PRN Demetria North PA-C        oseltamivir capsule 30 mg  30 mg Oral Q48H Lew Garcia MD   30 mg at 01/17/24 2139    pantoprazole EC tablet 40 mg  40 mg Oral Daily Lew Garcia MD        sodium chloride 0.9% bolus 250 mL 250 mL  250 mL Intravenous PRN Liam Patton MD        sodium chloride 0.9% flush 10 mL  10 mL Intravenous Q12H PRN Demetria North PA-C           Family History   Problem Relation Age of Onset    Hypertension Mother     Heart disease Mother     Hypertension Father     Heart disease Father     Heart disease Sister     Hypertension Sister        Past Surgical History:   Procedure Laterality Date    CHOLECYSTECTOMY      Dialysis shunt Left     HYSTERECTOMY         Social History     Socioeconomic History    Marital status:    Tobacco Use    Smoking status: Never     Passive exposure: Past    Smokeless tobacco: Never   Substance and Sexual Activity    Alcohol use: Not Currently    Drug use: Never     Social Determinants of Health     Financial Resource Strain:  Patient Declined (8/22/2023)    Overall Financial Resource Strain (CARDIA)     Difficulty of Paying Living Expenses: Patient declined   Food Insecurity: Patient Declined (8/22/2023)    Hunger Vital Sign     Worried About Running Out of Food in the Last Year: Patient declined     Ran Out of Food in the Last Year: Patient declined   Transportation Needs: Patient Declined (8/22/2023)    PRAPARE - Transportation     Lack of Transportation (Medical): Patient declined     Lack of Transportation (Non-Medical): Patient declined   Physical Activity: Patient Declined (8/22/2023)    Exercise Vital Sign     Days of Exercise per Week: Patient declined     Minutes of Exercise per Session: Patient declined   Stress: Patient Declined (8/22/2023)    [employer] [place] [employer] - Occupational Stress Questionnaire     Feeling of Stress : Patient declined   Social Connections: Patient Declined (8/22/2023)    Social Connection and Isolation Panel [NHANES]     Frequency of Communication with Friends and Family: Patient declined     Frequency of Social Gatherings with Friends and Family: Patient declined     Attends Jehovah's witness Services: Patient declined     Active Member of Clubs or Organizations: Patient declined     Attends Club or Organization Meetings: Patient declined     Marital Status: Patient declined   Housing Stability: Unknown (8/22/2023)    Housing Stability Vital Sign     Unable to Pay for Housing in the Last Year: Patient refused     Unstable Housing in the Last Year: Patient refused       Physical Examination:  Vital signs:  Temp:  [97 °F (36.1 °C)-98.1 °F (36.7 °C)] 97.3 °F (36.3 °C)  Pulse:  [] 77  Resp:  [15-24] 18  SpO2:  [86 %-100 %] 91 %  BP: ()/(39-70) 87/54  Patient Vitals for the past 8 hrs:   BP Temp Temp src Pulse Resp SpO2   01/18/24 0427 (!) 87/54 97.3 °F (36.3 °C) Oral 77 -- (!) 91 %   01/18/24 0421 -- -- -- 67 18 95 %   01/18/24 0103 98/60 -- -- 75 -- --   01/18/24 0022 -- -- -- 70 18  (!) 92 %   01/17/24 2327 (!) 73/39 97.8 °F (36.6 °C) Oral 71 -- (!) 91 %        Recent Results (from the past 24 hour(s))   COVID/FLU A&B PCR    Collection Time: 01/17/24  9:38 AM   Result Value Ref Range    Influenza A PCR Detected (A) Not Detected    Influenza B PCR Not Detected Not Detected    SARS-CoV-2 PCR Not Detected Not Detected, Negative   Comprehensive metabolic panel    Collection Time: 01/17/24  9:47 AM   Result Value Ref Range    Sodium Level 135 (L) 136 - 145 mmol/L    Potassium Level 5.4 (H) 3.5 - 5.1 mmol/L    Chloride 95 (L) 98 - 107 mmol/L    Carbon Dioxide 26 23 - 31 mmol/L    Glucose Level 117 (H) 82 - 115 mg/dL    Blood Urea Nitrogen 43.0 (H) 9.8 - 20.1 mg/dL    Creatinine 6.58 (H) 0.55 - 1.02 mg/dL    Calcium Level Total 9.1 8.4 - 10.2 mg/dL    Protein Total 6.5 5.8 - 7.6 gm/dL    Albumin Level 3.7 3.4 - 4.8 g/dL    Globulin 2.8 2.4 - 3.5 gm/dL    Albumin/Globulin Ratio 1.3 1.1 - 2.0 ratio    Bilirubin Total 0.9 <=1.5 mg/dL    Alkaline Phosphatase 251 (H) 40 - 150 unit/L    Alanine Aminotransferase 20 0 - 55 unit/L    Aspartate Aminotransferase 26 5 - 34 unit/L    eGFR 6 mls/min/1.73/m2   Brain natriuretic peptide    Collection Time: 01/17/24  9:47 AM   Result Value Ref Range    Natriuretic Peptide 1,040.5 (H) <=100.0 pg/mL   APTT    Collection Time: 01/17/24  9:47 AM   Result Value Ref Range    PTT 45.9 (H) 23.2 - 33.7 seconds   Protime-INR    Collection Time: 01/17/24  9:47 AM   Result Value Ref Range    PT 19.6 (H) 12.5 - 14.5 seconds    INR 1.7 (H) <=1.3   Troponin I    Collection Time: 01/17/24  9:47 AM   Result Value Ref Range    Troponin-I 0.047 (H) 0.000 - 0.045 ng/mL   TSH    Collection Time: 01/17/24  9:47 AM   Result Value Ref Range    TSH 4.050 0.350 - 4.940 uIU/mL   Magnesium    Collection Time: 01/17/24  9:47 AM   Result Value Ref Range    Magnesium Level 2.30 1.60 - 2.60 mg/dL   CBC with Differential    Collection Time: 01/17/24  9:47 AM   Result Value Ref Range    WBC 9.97 4.50  - 11.50 x10(3)/mcL    RBC 3.45 (L) 4.20 - 5.40 x10(6)/mcL    Hgb 10.9 (L) 12.0 - 16.0 g/dL    Hct 34.7 (L) 37.0 - 47.0 %    .6 (H) 80.0 - 94.0 fL    MCH 31.6 (H) 27.0 - 31.0 pg    MCHC 31.4 (L) 33.0 - 36.0 g/dL    RDW 14.8 11.5 - 17.0 %    Platelet 150 130 - 400 x10(3)/mcL    MPV 10.1 7.4 - 10.4 fL    Neut % 67.3 %    Lymph % 18.7 %    Mono % 9.8 %    Eos % 2.5 %    Basophil % 1.2 %    Lymph # 1.86 0.6 - 4.6 x10(3)/mcL    Neut # 6.71 2.1 - 9.2 x10(3)/mcL    Mono # 0.98 0.1 - 1.3 x10(3)/mcL    Eos # 0.25 0 - 0.9 x10(3)/mcL    Baso # 0.12 <=0.2 x10(3)/mcL    IG# 0.05 (H) 0 - 0.04 x10(3)/mcL    IG% 0.5 %    NRBC% 0.0 %   Hepatitis B Surface Antigen    Collection Time: 01/17/24  9:47 AM   Result Value Ref Range    Hepatitis B Surface Antigen Nonreactive Nonreactive   Troponin I    Collection Time: 01/17/24  2:17 PM   Result Value Ref Range    Troponin-I 0.033 0.000 - 0.045 ng/mL   POCT glucose    Collection Time: 01/17/24  4:25 PM   Result Value Ref Range    POCT Glucose 184 (H) 70 - 110 mg/dL   Troponin I    Collection Time: 01/17/24  8:03 PM   Result Value Ref Range    Troponin-I 0.019 0.000 - 0.045 ng/mL   POCT glucose    Collection Time: 01/18/24  1:07 AM   Result Value Ref Range    POCT Glucose 220 (H) 70 - 110 mg/dL   Comprehensive Metabolic Panel (CMP)    Collection Time: 01/18/24  1:59 AM   Result Value Ref Range    Sodium Level 133 (L) 136 - 145 mmol/L    Potassium Level 5.0 3.5 - 5.1 mmol/L    Chloride 94 (L) 98 - 107 mmol/L    Carbon Dioxide 24 23 - 31 mmol/L    Glucose Level 217 (H) 82 - 115 mg/dL    Blood Urea Nitrogen 33.3 (H) 9.8 - 20.1 mg/dL    Creatinine 5.01 (H) 0.55 - 1.02 mg/dL    Calcium Level Total 9.3 8.4 - 10.2 mg/dL    Protein Total 6.6 5.8 - 7.6 gm/dL    Albumin Level 3.2 (L) 3.4 - 4.8 g/dL    Globulin 3.4 2.4 - 3.5 gm/dL    Albumin/Globulin Ratio 0.9 (L) 1.1 - 2.0 ratio    Bilirubin Total 0.8 <=1.5 mg/dL    Alkaline Phosphatase 209 (H) 40 - 150 unit/L    Alanine Aminotransferase 21 0 -  55 unit/L    Aspartate Aminotransferase 24 5 - 34 unit/L    eGFR 8 mls/min/1.73/m2   Troponin I    Collection Time: 01/18/24  1:59 AM   Result Value Ref Range    Troponin-I 0.019 0.000 - 0.045 ng/mL   Phosphorus    Collection Time: 01/18/24  1:59 AM   Result Value Ref Range    Phosphorus Level 5.4 (H) 2.3 - 4.7 mg/dL   CBC with Differential    Collection Time: 01/18/24  1:59 AM   Result Value Ref Range    WBC 10.62 4.50 - 11.50 x10(3)/mcL    RBC 3.26 (L) 4.20 - 5.40 x10(6)/mcL    Hgb 10.2 (L) 12.0 - 16.0 g/dL    Hct 32.7 (L) 37.0 - 47.0 %    .3 (H) 80.0 - 94.0 fL    MCH 31.3 (H) 27.0 - 31.0 pg    MCHC 31.2 (L) 33.0 - 36.0 g/dL    RDW 14.8 11.5 - 17.0 %    Platelet 140 130 - 400 x10(3)/mcL    MPV 10.5 (H) 7.4 - 10.4 fL    Neut % 89.7 %    Lymph % 5.0 %    Mono % 2.9 %    Eos % 1.6 %    Basophil % 0.4 %    Lymph # 0.53 (L) 0.6 - 4.6 x10(3)/mcL    Neut # 9.53 (H) 2.1 - 9.2 x10(3)/mcL    Mono # 0.31 0.1 - 1.3 x10(3)/mcL    Eos # 0.17 0 - 0.9 x10(3)/mcL    Baso # 0.04 <=0.2 x10(3)/mcL    IG# 0.04 0 - 0.04 x10(3)/mcL    IG% 0.4 %    NRBC% 0.0 %     [unfilled]  Wt Readings from Last 3 Encounters:   01/17/24 124 kg (273 lb 5.9 oz)   09/23/23 (!) 139.3 kg (307 lb)   08/21/23 134.7 kg (297 lb)         Physical Exam   Constitutional: Oriented to person, place, and time and well-developed, well-nourished, and in no distress.   Eyes: Conjunctivae and EOM are normal. Pupils are equal, round, and reactive to light.   Neck: Normal range of motion. Neck supple.   Cardiovascular: Normal rate, irregular rhythm and normal heart sounds.  AFib.  Pulmonary/Chest: Effort normal and breath sounds normal.   Abdominal: Soft. Bowel sounds are normal. There is no tenderness.   Musculoskeletal: Normal range of motion.   Neurological: Alert and oriented to person, place, and time. Gait normal.   Skin: Skin is warm and dry.   Psychiatric: Affect normal.       Assessment/Plan:    AFib RVR  -known history   -currently rate controlled  without medical therapy  -hold off on AV ben agents given hypotension  -continue Lovenox 1 milligram/kilogram daily  -close telemetry monitoring    Flu positive  -Tamiflu per internal medicine team   -continue supportive care    Volume overload   -recent echocardiogram November 2023 with normal LVEF   -volume management per Nephrology and dialysis    ESRD, on HD   -per Nephrology    Elevated troponin   -mild and nonspecific troponin elevation with peak 0.04, has trended down to 0.01   -secondary to AFib RVR and flu positive status   -no plans for invasive evaluation   -monitor for angina          *Patient of Dr. Taylor.          Brook Willson, MEGANP-C  [facility]

## 2024-01-18 NOTE — NURSING
Nurses Note -- 4 Eyes      1/18/2024   7:35 AM      Skin assessed during: Admit      [x] No Altered Skin Integrity Present    []Prevention Measures Documented      [] Yes- Altered Skin Integrity Present or Discovered   [] LDA Added if Not in Epic (Describe Wound)   [] New Altered Skin Integrity was Present on Admit and Documented in LDA   [] Wound Image Taken    Wound Care Consulted? No    Attending Nurse:  Shefali Malik RN/Staff Member:   Joana Terry RN

## 2024-01-18 NOTE — PROGRESS NOTES
[facility]  Hospital Medicine Progress Note      Chief Complaint: SOB    HPI: (personally reviewed by me and is documented from initial H&P)    79 y.o. White female with a past medical history of hypertension, hyperlipidemia, paroxysmal atrial fibrillation on Eliquis, congestive heart failure (> 50 % LVEF in November 2023), ESRD on HD MWF, anemia chronic disease, hypothyroidism, diabetes mellitus type 2, anxiety/depression, Parkinson's disease and on CPAP all the time.     The patient presented to [facility] on 1/17/2024 with a primary complaint of shortness of breath for the last since yesterday (01/16/2024).  Patient reports having an intermittent cough with sputum production which is orange.  She denies complaints of abdominal pain, nausea, vomiting, diarrhea and chest pain.  Her last full session of dialysis was on 01/15/2024.     Upon EMS arrival patient's heart rate was 190. In route she received albuterol x3, Atrovent x1, 125 mg of Solu-Medrol, 5 mg of metoprolol and 250 mL of normal saline. Upon presentation to the ED, temperature 97.5° F, heart rate 140, blood pressure 107/44, respiratory rate 22 and SpO2 94% on 8 L. Labs WBC 9.97, H&H 10.9/34.7, .6, potassium 5.4, BUN/creatinine 43/6.58, glucose 117, alkaline phosphatase 251, BNP 1040.5, troponin 0.047.  Influenza A positive.  Influenza B and SARS-CoV-2 PCR negative.  EKG atrial fibrillation with rapid ventricular rate of 136.  In ED patient received albuterol nebulizer, 40 mg of IV Lasix, Tamiflu, 2 g of magnesium sulfate and 25 mg of Cardizem.  Patient was admitted to hospital medicine services for further medical management.    Interval Hx:     No overnight events.  No new complaints.  Patient eager to discharge back to nursing care facility.      __________________________________________________________________________________________________________________________________      Objective/physical exam:  Vital signs  have been personally reviewed by me   Neuro: awake, alert, oriented.     General: Appears comfortable, no acute distress.    Integumentary: Warm, dry, intact.  Musculoskeletal: Purposeful movement noted.     Respiratory: No accessory muscle use. Breath sounds are equal.  Cardiovascular: Regular rate.       VITAL SIGNS: 24 HRS MIN & MAX LAST   Temp  Min: 97 °F (36.1 °C)  Max: 98.1 °F (36.7 °C) 98.1 °F (36.7 °C)   BP  Min: 73/39  Max: 109/56 (!) 92/57   Pulse  Min: 67  Max: 93  85   Resp  Min: 15  Max: 21 18   SpO2  Min: 86 %  Max: 98 % 98 %     X-Ray Chest AP Portable  Narrative: EXAMINATION:  XR CHEST AP PORTABLE    CLINICAL HISTORY:  Shortness of breath    TECHNIQUE:  Single frontal view of the chest was performed.    COMPARISON:  09/23/2023    FINDINGS:  There is pulmonary edema and pulmonary venous congestion bilaterally.  There is no pleural effusion..  The heart is enlarged in size.  Aorta is unremarkable.  The bones and joints show no acute abnormality.  Impression: Increased volume status/CHF    Electronically signed by: Fuad Ramos  Date:    01/17/2024  Time:    10:06    Recent Labs   Lab 01/17/24  0947 01/18/24  0159   WBC 9.97 10.62   RBC 3.45* 3.26*   HGB 10.9* 10.2*   HCT 34.7* 32.7*   .6* 100.3*   MCH 31.6* 31.3*   MCHC 31.4* 31.2*   RDW 14.8 14.8    140   MPV 10.1 10.5*       Recent Labs   Lab 01/17/24  0947 01/18/24  0159   * 133*   K 5.4* 5.0   CO2 26 24   BUN 43.0* 33.3*   CREATININE 6.58* 5.01*   CALCIUM 9.1 9.3   MG 2.30  --    ALBUMIN 3.7 3.2*   ALKPHOS 251* 209*   ALT 20 21   AST 26 24   BILITOT 0.9 0.8          Microbiology Results (last 7 days)       ** No results found for the last 168 hours. **             See below for Radiology    Scheduled Med:   atorvastatin  40 mg Oral Daily    enoxparin  1 mg/kg (Dosing Weight) Subcutaneous Q24H    epoetin louann  20,000 Units Subcutaneous Once    fluticasone furoate-vilanteroL  1 puff Inhalation Daily    hydrOXYzine pamoate  25 mg  Oral TID    insulin NPH-insulin regular (70/30)  15 Units Subcutaneous BID    Lactobacillus acidophilus  1 capsule Oral TID WM    lactulose 10 gram/15 ml  30 g Oral Q6H    levalbuterol  0.63 mg Nebulization Q4H    levothyroxine  50 mcg Oral Before breakfast    mupirocin   Nasal BID    oseltamivir  30 mg Oral Q48H    pantoprazole  40 mg Oral Daily        Continuous Infusions:   dilTIAZem 10 mg/hr (01/18/24 1000)        PRN Meds:  acetaminophen, acetaminophen, acetaminophen, dextrose 10%, dextrose 10%, dextrose 10%, dextrose 10%, glucagon (human recombinant), glucagon (human recombinant), glucose, glucose, glucose, glucose, hydrocortisone, insulin aspart U-100, ondansetron, sodium chloride 0.9%, sodium chloride 0.9%       __________________________________________________________________________________________________________________________________    Assessment/Plan:  Volume overload secondary to ESRD on HD and congestive failure exacerbation  Atrial fibrillation with rapid ventricular rate, on Eliquis   Influenza A  Anemia chronic disease, stable   Hyperkalemia  ESRD on HD MWF  Elevated alkaline phosphatase   NSTEMI type 2 likely secondary to volume overload  hypertension,   hyperlipidemia   hypothyroidism diabetes mellitus type 2   anxiety/depression   Parkinson's disease      Above present on admission   _______________________________________________________________________________________________________________________________  Dialysis to be continue at discretion of nephrologist  Continue to monitor volume status.  Atrial fibrillation with RVR, continue Cardizem drip  Continue Tamiflu    Continue supportive care  Continue checking vital signs q4hrs.   Nurse notified to page me if any changes occur     DVT prophylaxis initiated   Nutrition Status:  Renal diet    Consults:  Nephrologists    I have personally reviewed the specialist documentation and/or have spoken to the specialist with regard to the care of  this patient; recommendations are noted above.     This is a critical care document  Critical Care Diagnosis:  Atrial fibrillation requiring cardioversion  Critical care interventions: hands on evaluation, review of labs/radiographs/records and discussions; assessing and managing the high probability of imminent or life-threatening deterioration of cardiorespiratory status.  Critical care time spent > 40 minutes.     I have spent >30 minutes on the day of the visit; time spent includes face to face time and non-face to face time preparing to see the patient (eg, review of tests), independently reviewing and interpreting medical records, both past and current; documenting clinical information in the electronic or other health record, and communicating results to the patient/family/caregiver and care coordinator and nursing team.      Anticipated discharge and Disposition when medically stable:  Pending.     All diagnosis and differential diagnosis have been reviewed,  interpreted and communicated appropriately to care team. assessment and plan has been documented; I have personally reviewed the labs and test results that are presently available and pertinent to this hospital course; I have reviewed medical records based upon their availability.    All of the patient's questions have been  addressed and answered. Patient's is agreeable to the above stated plan.   I will continue to monitor closely and make adjustments to medical management as needed.    Chhaya Singh, DO   01/18/2024        This note was created with the assistance of Dragon voice recognition software. There may be transcription errors as a result of using this technology however minimal. Effort has been made to assure accuracy of transcription but any obvious errors or omissions should be clarified with the author of the document.

## 2024-01-18 NOTE — PLAN OF CARE
01/18/24 1507   Discharge Assessment   Assessment Type Discharge Planning Assessment   Source of Information patient   Communicated SHAISTA with patient/caregiver Date not available/Unable to determine   Reason For Admission Shortness of Breath   People in Home facility resident   Facility Arrived From: [facility]   Do you expect to return to your current living situation? Yes   Current cognitive status: Alert/Oriented   Who is going to help you get home at discharge? [facility] will provide transportation   How do you get to doctors appointments? other (see comments)  ([facility] provides transportation)   Are you on dialysis? Yes   Dialysis Name and Scheduled days [facility]   Do you take coumadin? No   Discharge Plan A Return to nursing home   Discharge Plan B Return to Nursing Home   Discharge Plan discussed with: Patient   Transition of Care Barriers None   OTHER   Name(s) of People in Home Patient is resident at [facility]

## 2024-01-19 LAB
ALBUMIN SERPL-MCNC: 3.3 G/DL (ref 3.4–4.8)
BASOPHILS # BLD AUTO: 0.03 X10(3)/MCL
BASOPHILS NFR BLD AUTO: 0.2 %
BUN SERPL-MCNC: 32.9 MG/DL (ref 9.8–20.1)
CALCIUM SERPL-MCNC: 9 MG/DL (ref 8.4–10.2)
CHLORIDE SERPL-SCNC: 98 MMOL/L (ref 98–107)
CO2 SERPL-SCNC: 28 MMOL/L (ref 23–31)
CREAT SERPL-MCNC: 4.4 MG/DL (ref 0.55–1.02)
EOSINOPHIL # BLD AUTO: 0 X10(3)/MCL (ref 0–0.9)
EOSINOPHIL NFR BLD AUTO: 0 %
ERYTHROCYTE [DISTWIDTH] IN BLOOD BY AUTOMATED COUNT: 14.8 % (ref 11.5–17)
GFR SERPLBLD CREATININE-BSD FMLA CKD-EPI: 10 MLS/MIN/1.73/M2
GLUCOSE SERPL-MCNC: 127 MG/DL (ref 82–115)
HCT VFR BLD AUTO: 32.3 % (ref 37–47)
HGB BLD-MCNC: 10.4 G/DL (ref 12–16)
IMM GRANULOCYTES # BLD AUTO: 0.09 X10(3)/MCL (ref 0–0.04)
IMM GRANULOCYTES NFR BLD AUTO: 0.6 %
LYMPHOCYTES # BLD AUTO: 0.67 X10(3)/MCL (ref 0.6–4.6)
LYMPHOCYTES NFR BLD AUTO: 4.4 %
MAGNESIUM SERPL-MCNC: 2.4 MG/DL (ref 1.6–2.6)
MCH RBC QN AUTO: 31.4 PG (ref 27–31)
MCHC RBC AUTO-ENTMCNC: 32.2 G/DL (ref 33–36)
MCV RBC AUTO: 97.6 FL (ref 80–94)
MONOCYTES # BLD AUTO: 0.69 X10(3)/MCL (ref 0.1–1.3)
MONOCYTES NFR BLD AUTO: 4.5 %
NEUTROPHILS # BLD AUTO: 13.88 X10(3)/MCL (ref 2.1–9.2)
NEUTROPHILS NFR BLD AUTO: 90.3 %
NRBC BLD AUTO-RTO: 0 %
PHOSPHATE SERPL-MCNC: 4.1 MG/DL (ref 2.3–4.7)
PHOSPHATE SERPL-MCNC: 4.2 MG/DL (ref 2.3–4.7)
PLATELET # BLD AUTO: 186 X10(3)/MCL (ref 130–400)
PMV BLD AUTO: 10.9 FL (ref 7.4–10.4)
POCT GLUCOSE: 143 MG/DL (ref 70–110)
POTASSIUM SERPL-SCNC: 4.8 MMOL/L (ref 3.5–5.1)
RBC # BLD AUTO: 3.31 X10(6)/MCL (ref 4.2–5.4)
SODIUM SERPL-SCNC: 136 MMOL/L (ref 136–145)
WBC # SPEC AUTO: 15.36 X10(3)/MCL (ref 4.5–11.5)

## 2024-01-19 PROCEDURE — 85025 COMPLETE CBC W/AUTO DIFF WBC: CPT | Performed by: INTERNAL MEDICINE

## 2024-01-19 PROCEDURE — 21400001 HC TELEMETRY ROOM

## 2024-01-19 PROCEDURE — 94640 AIRWAY INHALATION TREATMENT: CPT

## 2024-01-19 PROCEDURE — 25000003 PHARM REV CODE 250: Performed by: INTERNAL MEDICINE

## 2024-01-19 PROCEDURE — 83735 ASSAY OF MAGNESIUM: CPT | Performed by: STUDENT IN AN ORGANIZED HEALTH CARE EDUCATION/TRAINING PROGRAM

## 2024-01-19 PROCEDURE — 80100016 HC MAINTENANCE HEMODIALYSIS

## 2024-01-19 PROCEDURE — 27000221 HC OXYGEN, UP TO 24 HOURS

## 2024-01-19 PROCEDURE — 80069 RENAL FUNCTION PANEL: CPT | Performed by: INTERNAL MEDICINE

## 2024-01-19 PROCEDURE — 84100 ASSAY OF PHOSPHORUS: CPT | Performed by: STUDENT IN AN ORGANIZED HEALTH CARE EDUCATION/TRAINING PROGRAM

## 2024-01-19 PROCEDURE — 99900035 HC TECH TIME PER 15 MIN (STAT)

## 2024-01-19 PROCEDURE — 94761 N-INVAS EAR/PLS OXIMETRY MLT: CPT

## 2024-01-19 PROCEDURE — 25000003 PHARM REV CODE 250: Performed by: STUDENT IN AN ORGANIZED HEALTH CARE EDUCATION/TRAINING PROGRAM

## 2024-01-19 PROCEDURE — 25000003 PHARM REV CODE 250: Performed by: EMERGENCY MEDICINE

## 2024-01-19 PROCEDURE — 25000242 PHARM REV CODE 250 ALT 637 W/ HCPCS: Performed by: STUDENT IN AN ORGANIZED HEALTH CARE EDUCATION/TRAINING PROGRAM

## 2024-01-19 PROCEDURE — 63600175 PHARM REV CODE 636 W HCPCS: Performed by: PHYSICIAN ASSISTANT

## 2024-01-19 PROCEDURE — 27000207 HC ISOLATION

## 2024-01-19 RX ORDER — BENZONATATE 100 MG/1
100 CAPSULE ORAL 3 TIMES DAILY PRN
Status: DISCONTINUED | OUTPATIENT
Start: 2024-01-19 | End: 2024-01-20 | Stop reason: HOSPADM

## 2024-01-19 RX ORDER — METOPROLOL SUCCINATE 25 MG/1
25 TABLET, EXTENDED RELEASE ORAL DAILY
Status: DISCONTINUED | OUTPATIENT
Start: 2024-01-19 | End: 2024-01-20

## 2024-01-19 RX ADMIN — ATORVASTATIN CALCIUM 40 MG: 40 TABLET, FILM COATED ORAL at 04:01

## 2024-01-19 RX ADMIN — Medication 1 CAPSULE: at 11:01

## 2024-01-19 RX ADMIN — HYDROXYZINE PAMOATE 25 MG: 25 CAPSULE ORAL at 08:01

## 2024-01-19 RX ADMIN — OSELTAMIVIR PHOSPHATE 30 MG: 30 CAPSULE ORAL at 08:01

## 2024-01-19 RX ADMIN — ENOXAPARIN SODIUM 120 MG: 120 INJECTION SUBCUTANEOUS at 04:01

## 2024-01-19 RX ADMIN — FLUTICASONE FUROATE AND VILANTEROL TRIFENATATE 1 PUFF: 200; 25 POWDER RESPIRATORY (INHALATION) at 11:01

## 2024-01-19 RX ADMIN — HYDROXYZINE PAMOATE 25 MG: 25 CAPSULE ORAL at 04:01

## 2024-01-19 RX ADMIN — LEVALBUTEROL HYDROCHLORIDE 0.63 MG: 0.63 SOLUTION RESPIRATORY (INHALATION) at 08:01

## 2024-01-19 RX ADMIN — MUPIROCIN: 20 OINTMENT TOPICAL at 09:01

## 2024-01-19 RX ADMIN — DILTIAZEM HYDROCHLORIDE 10 MG/HR: 5 INJECTION INTRAVENOUS at 11:01

## 2024-01-19 RX ADMIN — Medication 1 CAPSULE: at 04:01

## 2024-01-19 RX ADMIN — LEVALBUTEROL HYDROCHLORIDE 0.63 MG: 0.63 SOLUTION RESPIRATORY (INHALATION) at 11:01

## 2024-01-19 RX ADMIN — PANTOPRAZOLE SODIUM 40 MG: 40 TABLET, DELAYED RELEASE ORAL at 11:01

## 2024-01-19 RX ADMIN — METOPROLOL SUCCINATE 25 MG: 25 TABLET, EXTENDED RELEASE ORAL at 04:01

## 2024-01-19 RX ADMIN — HYDROXYZINE PAMOATE 25 MG: 25 CAPSULE ORAL at 11:01

## 2024-01-19 NOTE — PROGRESS NOTES
"[facility]  Hospital Medicine Progress Note      Chief Complaint: SOB    HPI: (personally reviewed by me and is documented from initial H&P)    79 y.o. White female with a past medical history of hypertension, hyperlipidemia, paroxysmal atrial fibrillation on Eliquis, congestive heart failure (> 50 % LVEF in November 2023), ESRD on HD MWF, anemia chronic disease, hypothyroidism, diabetes mellitus type 2, anxiety/depression, Parkinson's disease and on CPAP all the time.     The patient presented to [facility] on 1/17/2024 with a primary complaint of shortness of breath for the last since yesterday (01/16/2024).  Patient reports having an intermittent cough with sputum production which is orange.  She denies complaints of abdominal pain, nausea, vomiting, diarrhea and chest pain.  Her last full session of dialysis was on 01/15/2024.     Upon EMS arrival patient's heart rate was 190. In route she received albuterol x3, Atrovent x1, 125 mg of Solu-Medrol, 5 mg of metoprolol and 250 mL of normal saline. Upon presentation to the ED, temperature 97.5° F, heart rate 140, blood pressure 107/44, respiratory rate 22 and SpO2 94% on 8 L. Labs WBC 9.97, H&H 10.9/34.7, .6, potassium 5.4, BUN/creatinine 43/6.58, glucose 117, alkaline phosphatase 251, BNP 1040.5, troponin 0.047.  Influenza A positive.  Influenza B and SARS-CoV-2 PCR negative.  EKG atrial fibrillation with rapid ventricular rate of 136.  In ED patient received albuterol nebulizer, 40 mg of IV Lasix, Tamiflu, 2 g of magnesium sulfate and 25 mg of Cardizem.  Patient was admitted to hospital medicine services for further medical management.    Interval Hx:   Patient was not given some of her medications on yesterday, including Lovenox reason being "patient unavailable"; today's nurse is notified and instructed to be certain patient receives all scheduled medications.    Patient complains that she has not ate in several days however on " yesterday, patient was eating shrimp stew with vegetables during my encounter with her.   She is upset that she is not being discharged.   No overnight events.  No new complaints.    Patient eager to discharge back to nursing care facility.      __________________________________________________________________________________________________________________________________      Objective/physical exam:  Vital signs have been personally reviewed by me  Disgruntled.    Neuro: awake, alert, oriented.     General: Appears comfortable, no acute distress.    Integumentary: Warm, dry, intact.  Musculoskeletal: Purposeful movement noted.     Respiratory: No accessory muscle use. Breath sounds are equal.  Cardiovascular: Regular rate.       VITAL SIGNS: 24 HRS MIN & MAX LAST   Temp  Min: 97.3 °F (36.3 °C)  Max: 98.8 °F (37.1 °C) 97.3 °F (36.3 °C)   BP  Min: 90/57  Max: 114/72 (!) 105/56   Pulse  Min: 80  Max: 91  91   Resp  Min: 18  Max: 18 18   SpO2  Min: 93 %  Max: 99 % (!) 93 %     X-Ray Chest AP Portable  Narrative: EXAMINATION:  XR CHEST AP PORTABLE    CLINICAL HISTORY:  Shortness of breath    TECHNIQUE:  Single frontal view of the chest was performed.    COMPARISON:  09/23/2023    FINDINGS:  There is pulmonary edema and pulmonary venous congestion bilaterally.  There is no pleural effusion..  The heart is enlarged in size.  Aorta is unremarkable.  The bones and joints show no acute abnormality.  Impression: Increased volume status/CHF    Electronically signed by: Fuad Ramos  Date:    01/17/2024  Time:    10:06    Recent Labs   Lab 01/17/24  0947 01/18/24  0159 01/19/24  0723   WBC 9.97 10.62 15.36*   RBC 3.45* 3.26* 3.31*   HGB 10.9* 10.2* 10.4*   HCT 34.7* 32.7* 32.3*   .6* 100.3* 97.6*   MCH 31.6* 31.3* 31.4*   MCHC 31.4* 31.2* 32.2*   RDW 14.8 14.8 14.8    140 186   MPV 10.1 10.5* 10.9*       Recent Labs   Lab 01/17/24  0947 01/18/24  0159 01/19/24  0723   * 133* 136   K 5.4* 5.0 4.8   CO2 26  24 28   BUN 43.0* 33.3* 32.9*   CREATININE 6.58* 5.01* 4.40*   CALCIUM 9.1 9.3 9.0   MG 2.30  --  2.40   ALBUMIN 3.7 3.2* 3.3*   ALKPHOS 251* 209*  --    ALT 20 21  --    AST 26 24  --    BILITOT 0.9 0.8  --           Microbiology Results (last 7 days)       ** No results found for the last 168 hours. **             See below for Radiology    Scheduled Med:   atorvastatin  40 mg Oral Daily    enoxparin  1 mg/kg (Dosing Weight) Subcutaneous Q24H    fluticasone furoate-vilanteroL  1 puff Inhalation Daily    hydrOXYzine pamoate  25 mg Oral TID    insulin NPH-insulin regular (70/30)  15 Units Subcutaneous BID    Lactobacillus acidophilus  1 capsule Oral TID WM    levalbuterol  0.63 mg Nebulization Q4H    levothyroxine  50 mcg Oral Before breakfast    mupirocin   Nasal BID    oseltamivir  30 mg Oral Q48H    pantoprazole  40 mg Oral Daily        Continuous Infusions:   dilTIAZem 10 mg/hr (01/18/24 2237)        PRN Meds:  acetaminophen, acetaminophen, acetaminophen, dextrose 10%, dextrose 10%, dextrose 10%, dextrose 10%, glucagon (human recombinant), glucagon (human recombinant), glucose, glucose, glucose, glucose, hydrocortisone, insulin aspart U-100, ondansetron, sodium chloride 0.9%, sodium chloride 0.9%       __________________________________________________________________________________________________________________________________    Assessment/Plan:  Volume overload secondary to ESRD on HD and congestive failure exacerbation  Atrial fibrillation with rapid ventricular rate, on Eliquis   Influenza A  Anemia chronic disease, stable   Hyperkalemia  ESRD on HD MWF  Elevated alkaline phosphatase   NSTEMI type 2 likely secondary to volume overload  hypertension,   hyperlipidemia   hypothyroidism diabetes mellitus type 2   anxiety/depression   Parkinson's disease      Above present on admission    _______________________________________________________________________________________________________________________________  Dialysis to be continue at discretion of nephrologist  Continue to monitor volume status.  Atrial fibrillation with RVR, continue Cardizem drip.  Anticipate transition to oral agent and possible discharge back to the NH tomorrow; followup with cardiologist.     Continue Tamiflu  Decrease insulin to 15 units at bedtime, once daily  Continue supportive care  Continue checking vital signs q4hrs.   Nurse notified to page me if any changes occur     DVT prophylaxis initiated   Nutrition Status:  Renal diet    Consults:  Nephrologists    I have personally reviewed the specialist documentation and/or have spoken to the specialist with regard to the care of this patient; recommendations are noted above.     This is a critical care document  Critical Care Diagnosis:  Atrial fibrillation requiring cardioversion  Critical care interventions: hands on evaluation, review of labs/radiographs/records and discussions; assessing and managing the high probability of imminent or life-threatening deterioration of cardiorespiratory status.  Critical care time spent > 40 minutes.     I have spent >30 minutes on the day of the visit; time spent includes face to face time and non-face to face time preparing to see the patient (eg, review of tests), independently reviewing and interpreting medical records, both past and current; documenting clinical information in the electronic or other health record, and communicating results to the patient/family/caregiver and care coordinator and nursing team.      Anticipated discharge and Disposition when medically stable:  Pending.     All diagnosis and differential diagnosis have been reviewed,  interpreted and communicated appropriately to care team. assessment and plan has been documented; I have personally reviewed the labs and test results that are presently  available and pertinent to this hospital course; I have reviewed medical records based upon their availability.    All of the patient's questions have been  addressed and answered. Patient's is agreeable to the above stated plan.   I will continue to monitor closely and make adjustments to medical management as needed.    Chhaya Singh, DO   01/19/2024        This note was created with the assistance of Dragon voice recognition software. There may be transcription errors as a result of using this technology however minimal. Effort has been made to assure accuracy of transcription but any obvious errors or omissions should be clarified with the author of the document.

## 2024-01-19 NOTE — PROGRESS NOTES
Cardiology Daily Progress Note    Patient Name: Danae Sanchez  Age: 79 y.o.  : 1945  MRN: [id number]  Admission Date: 2024      Subjective: No acute cardiac events overnight. Heart rate and BP are better this morning.       Review of Systems   General ROS: negative.  Respiratory ROS: no cough, shortness of breath, or wheezing.  Cardiovascular ROS: no chest pain or dyspnea on exertion.  Gastrointestinal ROS: no abdominal pain, change in bowel habits, or black or bloody stools.  Genito-Urinary ROS: no dysuria, trouble voiding, or hematuria.  Musculoskeletal ROS: negative.  Neurological ROS: negative.      Health Status:  Review of patient's allergies indicates:   Allergen Reactions    Codeine Nausea And Vomiting    Cortisone Nausea Only and Nausea And Vomiting     only allergic to oral  only allergic to oral      Lisinopril Nausea And Vomiting and Rash    Morphine Itching       Current Facility-Administered Medications   Medication Dose Route Frequency Provider Last Rate Last Admin    acetaminophen tablet 500 mg  500 mg Oral Q6H PRN Lew Garcia MD        acetaminophen tablet 650 mg  650 mg Oral Q8H PRN Demetria North, PA-C        acetaminophen tablet 650 mg  650 mg Oral Q4H PRN Demetria North, PA-C   650 mg at 24 1045    atorvastatin tablet 40 mg  40 mg Oral Daily Lew Garcia MD        dextrose 10% bolus 125 mL 125 mL  12.5 g Intravenous PRN Demetria North, PA-C        dextrose 10% bolus 125 mL 125 mL  12.5 g Intravenous PRN Demetria North, PA-C        dextrose 10% bolus 250 mL 250 mL  25 g Intravenous PRN Demetria North, PA-C        dextrose 10% bolus 250 mL 250 mL  25 g Intravenous PRN Demetria North, PA-EDUARDO        diltiaZEM 125 mg in dextrose 5 % 125 mL IVPB (ready to mix) (titrating)  0-15 mg/hr Intravenous Continuous Ashleigh Michael MD 10 mL/hr at 24 10 mg/hr at 24    enoxaparin injection 120 mg  1 mg/kg (Dosing Weight) Subcutaneous Q24H Demetria North  DARELL, PA-C   120 mg at 01/17/24 1626    fluticasone furoate-vilanteroL 200-25 mcg/dose diskus inhaler 1 puff  1 puff Inhalation Daily Lew Garcia MD        glucagon (human recombinant) injection 1 mg  1 mg Intramuscular PRN Demetria North, PA-C        glucagon (human recombinant) injection 1 mg  1 mg Intramuscular PRN Demetria North, PA-EDUARDO        glucose chewable tablet 16 g  16 g Oral PRN Demetria North, PA-C        glucose chewable tablet 16 g  16 g Oral PRN Demetria North, PA-C        glucose chewable tablet 24 g  24 g Oral PRN Demetria North, PA-C        glucose chewable tablet 24 g  24 g Oral PRN Demetria North, PA-C        hydrocortisone suppository 25 mg  25 mg Rectal BID PRN Lew Garcia MD        hydrOXYzine pamoate capsule 25 mg  25 mg Oral TID Lew Garcia MD   25 mg at 01/18/24 2140    insulin aspart U-100 injection 0-10 Units  0-10 Units Subcutaneous QID (AC + HS) PRDemetria Logan PA-C   1 Units at 01/18/24 2140    insulin NPH-insulin regular (70/30) injection 15 Units  15 Units Subcutaneous BID Lew Garcia MD        Lactobacillus acidophilus capsule 1 capsule  1 capsule Oral TID WM Lew Garcia MD   1 capsule at 01/18/24 1231    levalbuterol nebulizer solution 0.63 mg  0.63 mg Nebulization Q4H Lew Garcia MD   0.63 mg at 01/18/24 1317    levothyroxine tablet 50 mcg  50 mcg Oral Before breakfast Lew Garcia MD   50 mcg at 01/18/24 0500    mupirocin 2 % ointment   Nasal BID Lew Garcia MD        ondansetron injection 4 mg  4 mg Intravenous Q4H PRDemetria Logan PA-C        oseltamivir capsule 30 mg  30 mg Oral Q48H Lew Garcia MD   30 mg at 01/17/24 2139    pantoprazole EC tablet 40 mg  40 mg Oral Daily Lew Garcia MD   40 mg at 01/18/24 0910    sodium chloride 0.9% bolus 250 mL 250 mL  250 mL Intravenous PRN Liam Patton MD        sodium chloride 0.9% flush 10 mL  10 mL Intravenous Q12H PRN Demetria North PA-C           Objective:  Patient Vitals for the  past 24 hrs:   BP Temp Temp src Pulse Resp SpO2   01/19/24 0404 (!) 105/56 97.3 °F (36.3 °C) Oral 91 18 (!) 93 %   01/18/24 2335 (!) 103/59 98.8 °F (37.1 °C) Oral 84 18 (!) 93 %   01/18/24 2014 114/72 97.6 °F (36.4 °C) Oral 88 -- 99 %   01/18/24 1547 (!) 90/57 98.1 °F (36.7 °C) Oral 86 -- 97 %   01/18/24 1317 -- -- -- 85 18 98 %   01/18/24 1144 (!) 92/57 98.1 °F (36.7 °C) Oral 80 -- 96 %     Recent Results (from the past 24 hour(s))   POCT glucose    Collection Time: 01/18/24 11:43 AM   Result Value Ref Range    POCT Glucose 197 (H) 70 - 110 mg/dL   Phosphorus    Collection Time: 01/19/24  1:06 AM   Result Value Ref Range    Phosphorus Level 4.1 2.3 - 4.7 mg/dL   Renal Function Panel    Collection Time: 01/19/24  7:23 AM   Result Value Ref Range    Sodium Level 136 136 - 145 mmol/L    Potassium Level 4.8 3.5 - 5.1 mmol/L    Chloride 98 98 - 107 mmol/L    Carbon Dioxide 28 23 - 31 mmol/L    Glucose Level 127 (H) 82 - 115 mg/dL    Blood Urea Nitrogen 32.9 (H) 9.8 - 20.1 mg/dL    Creatinine 4.40 (H) 0.55 - 1.02 mg/dL    Calcium Level Total 9.0 8.4 - 10.2 mg/dL    Albumin Level 3.3 (L) 3.4 - 4.8 g/dL    Phosphorus Level 4.2 2.3 - 4.7 mg/dL    eGFR 10 mls/min/1.73/m2   Magnesium    Collection Time: 01/19/24  7:23 AM   Result Value Ref Range    Magnesium Level 2.40 1.60 - 2.60 mg/dL   CBC with Differential    Collection Time: 01/19/24  7:23 AM   Result Value Ref Range    WBC 15.36 (H) 4.50 - 11.50 x10(3)/mcL    RBC 3.31 (L) 4.20 - 5.40 x10(6)/mcL    Hgb 10.4 (L) 12.0 - 16.0 g/dL    Hct 32.3 (L) 37.0 - 47.0 %    MCV 97.6 (H) 80.0 - 94.0 fL    MCH 31.4 (H) 27.0 - 31.0 pg    MCHC 32.2 (L) 33.0 - 36.0 g/dL    RDW 14.8 11.5 - 17.0 %    Platelet 186 130 - 400 x10(3)/mcL    MPV 10.9 (H) 7.4 - 10.4 fL    Neut % 90.3 %    Lymph % 4.4 %    Mono % 4.5 %    Eos % 0.0 %    Basophil % 0.2 %    Lymph # 0.67 0.6 - 4.6 x10(3)/mcL    Neut # 13.88 (H) 2.1 - 9.2 x10(3)/mcL    Mono # 0.69 0.1 - 1.3 x10(3)/mcL    Eos # 0.00 0 - 0.9 x10(3)/mcL     Baso # 0.03 <=0.2 x10(3)/mcL    IG# 0.09 (H) 0 - 0.04 x10(3)/mcL    IG% 0.6 %    NRBC% 0.0 %     [unfilled]  Wt Readings from Last 3 Encounters:   01/17/24 124 kg (273 lb 5.9 oz)   09/23/23 (!) 139.3 kg (307 lb)   08/21/23 134.7 kg (297 lb)       Physical Exam:  General: Alert and oriented, no acute distress.  Neck: No carotid bruit, no jugular venous distention.  Respiratory: Breath sounds are equal, symmetrical chest wall expansion. Breath sounds are diminished, on NC .  Cardiovascular: Normal rate, regular rhythm. No murmur. No gallop. No edema noted. Patient is on tele.  Integumentary: Clean, warm, dry, and intact.  Neurologic: Alert and oriented.   Psychiatric: Cooperative, appropriate mood and affect.        Assessment/Plan:    AFib RVR  -known history   -currently rate controlled without medical therapy  -hold off on AV ben agents given borderline hypotension - may be able to start over the wweekend  -continue Lovenox 1 milligram/kilogram daily  -close telemetry monitoring     Flu positive  -Tamiflu per internal medicine team   -continue supportive care     Volume overload   -recent echocardiogram November 2023 with normal LVEF   -volume management per Nephrology and dialysis     ESRD, on HD   -per Nephrology     Elevated troponin   -mild and nonspecific troponin elevation with peak 0.04, has trended down to 0.01   -secondary to AFib RVR and flu positive status   -no plans for invasive evaluation   -monitor for angina           *Patient of Dr. Brandon Caldwell-BRANDYN Delgadillo-C  [facility]

## 2024-01-19 NOTE — PT/OT/SLP PROGRESS
Pt has no interest in PT in the hospital. Pt just wants to d/c back to her NH. Will d/c at this time.

## 2024-01-19 NOTE — PROGRESS NOTES
01/19/24 1036        Hemodialysis AV Fistula Left upper arm   No placement date or time found.   Present Prior to Hospital Arrival?: Yes  Location: Left upper arm   Needle Size 15ga   Site Assessment Clean;Dry;Intact;No redness;No swelling   Patency Present;Thrill;Bruit   Status Deaccessed   Flows Good   Dressing Status Clean;Dry;Intact   Site Condition No complications   Dressing Gauze   Post-Hemodialysis Assessment   Rinseback Volume (mL) 250 mL   Blood Volume Processed (Liters) 63 L   Dialyzer Clearance Lightly streaked   Duration of Treatment 180 minutes   Additional Fluid Intake (mL) 250 mL   Total UF (mL) 4000 mL   Net Fluid Removal 3500   Patient Response to Treatment tolerated well   Post-Hemodialysis Comments completed 3hr HD tx without complications. Removed 3.5L Net appropriately using critline. No meds with HD.

## 2024-01-19 NOTE — PROGRESS NOTES
Chief complaint: none    Interval History:  Pt had HD this morning with additional 3.5 L UF, feels much better asking to go home    Review of Systems   Constitutional: Negative.    HENT: Negative.     Respiratory:  Positive for cough and wheezing.    Cardiovascular: Negative.    Gastrointestinal: Negative.    Genitourinary: Negative.    Musculoskeletal: Negative.    Neurological: Negative.    Psychiatric/Behavioral: Negative.        all else Neg     atorvastatin  40 mg Oral Daily    enoxparin  1 mg/kg (Dosing Weight) Subcutaneous Q24H    fluticasone furoate-vilanteroL  1 puff Inhalation Daily    hydrOXYzine pamoate  25 mg Oral TID    [START ON 1/20/2024] insulin NPH-insulin regular (70/30)  15 Units Subcutaneous QHS    Lactobacillus acidophilus  1 capsule Oral TID WM    levalbuterol  0.63 mg Nebulization Q4H    levothyroxine  50 mcg Oral Before breakfast    mupirocin   Nasal BID    oseltamivir  30 mg Oral Q48H    pantoprazole  40 mg Oral Daily       Objective     VITAL SIGNS: 24 HR MIN & MAX LAST    Temp  Min: 97.3 °F (36.3 °C)  Max: 98.8 °F (37.1 °C)  98.3 °F (36.8 °C)    BP  Min: 90/57  Max: 114/72  99/63     Pulse  Min: 84  Max: 92  92     Resp  Min: 18  Max: 18  18    SpO2  Min: 93 %  Max: 99 %  99 %      Wt Readings from Last 3 Encounters:   01/17/24 124 kg (273 lb 5.9 oz)   09/23/23 (!) 139.3 kg (307 lb)   08/21/23 134.7 kg (297 lb)       Intake/Output Summary (Last 24 hours) at 1/19/2024 1213  Last data filed at 1/19/2024 1036  Gross per 24 hour   Intake 550 ml   Output 4000 ml   Net -3450 ml       Physical Exam  Constitutional:       General: She is not in acute distress.  HENT:      Mouth/Throat:      Pharynx: Oropharynx is clear.   Cardiovascular:      Rate and Rhythm: Normal rate. Rhythm irregular.   Pulmonary:      Breath sounds: Wheezing present.   Abdominal:      General: Bowel sounds are normal.      Palpations: Abdomen is soft.      Tenderness: There is no abdominal tenderness.   Musculoskeletal:          General: No swelling.      Cervical back: Normal range of motion.   Neurological:      Mental Status: She is alert and oriented to person, place, and time.   Psychiatric:         Mood and Affect: Mood normal.          Recent Labs     01/17/24  0947 01/18/24  0159 01/19/24  0106 01/19/24  0723   * 133*  --  136   K 5.4* 5.0  --  4.8   CHLORIDE 95* 94*  --  98   CO2 26 24  --  28   BUN 43.0* 33.3*  --  32.9*   CREATININE 6.58* 5.01*  --  4.40*   GLUCOSE 117* 217*  --  127*   CALCIUM 9.1 9.3  --  9.0   MG 2.30  --   --  2.40   PHOS  --  5.4*   < > 4.2   ALBUMIN 3.7 3.2*  --  3.3*    < > = values in this interval not displayed.      Recent Labs     01/17/24  0947 01/18/24  0159 01/19/24  0723   WBC 9.97 10.62 15.36*   HGB 10.9* 10.2* 10.4*   HCT 34.7* 32.7* 32.3*    140 186         Assessment & Plan     ESRD - she had 4 L UF easily removed on HD Wednesday, with additional 3.5 L UF today, appears euvolemic, ok to resume routine MWF HD via LUE AVF.  OK for d/c home from renal perspective  CHF, chronic hypotension - midodrine resumed,  EF normal on echo in November, she appears euvolemic p HD today  Influenza - nebs prn, tamiflu renally dosed, steroids as per primary  AF / RVR - cardizem, IV Mg++ given, cardiology consulted, on lovenox  Anemia -Hb stable 10.4, EPO 20K U given  Constipation - resolved p linzess, lactulose  DM2 - mngt as per primary    Will f/u Monday if she remains inpt, please call for any issues over the weekend

## 2024-01-20 VITALS
DIASTOLIC BLOOD PRESSURE: 57 MMHG | BODY MASS INDEX: 42.91 KG/M2 | OXYGEN SATURATION: 92 % | TEMPERATURE: 98 F | SYSTOLIC BLOOD PRESSURE: 111 MMHG | HEART RATE: 86 BPM | RESPIRATION RATE: 18 BRPM | HEIGHT: 67 IN | WEIGHT: 273.38 LBS

## 2024-01-20 LAB
POCT GLUCOSE: 113 MG/DL (ref 70–110)
POCT GLUCOSE: 123 MG/DL (ref 70–110)
POCT GLUCOSE: 191 MG/DL (ref 70–110)
POCT GLUCOSE: 96 MG/DL (ref 70–110)

## 2024-01-20 PROCEDURE — 94761 N-INVAS EAR/PLS OXIMETRY MLT: CPT

## 2024-01-20 PROCEDURE — 25000003 PHARM REV CODE 250: Performed by: STUDENT IN AN ORGANIZED HEALTH CARE EDUCATION/TRAINING PROGRAM

## 2024-01-20 PROCEDURE — 25000003 PHARM REV CODE 250: Performed by: EMERGENCY MEDICINE

## 2024-01-20 PROCEDURE — 25000242 PHARM REV CODE 250 ALT 637 W/ HCPCS: Performed by: STUDENT IN AN ORGANIZED HEALTH CARE EDUCATION/TRAINING PROGRAM

## 2024-01-20 PROCEDURE — 27000221 HC OXYGEN, UP TO 24 HOURS

## 2024-01-20 PROCEDURE — 99900035 HC TECH TIME PER 15 MIN (STAT)

## 2024-01-20 PROCEDURE — 25000003 PHARM REV CODE 250

## 2024-01-20 PROCEDURE — 94640 AIRWAY INHALATION TREATMENT: CPT

## 2024-01-20 RX ORDER — METOPROLOL SUCCINATE 50 MG/1
50 TABLET, EXTENDED RELEASE ORAL DAILY
Qty: 30 TABLET | Refills: 0
Start: 2024-01-20

## 2024-01-20 RX ORDER — OSELTAMIVIR PHOSPHATE 30 MG/1
30 CAPSULE ORAL
Qty: 1 CAPSULE | Refills: 0
Start: 2024-01-21 | End: 2024-01-23

## 2024-01-20 RX ORDER — LEVALBUTEROL INHALATION SOLUTION 0.63 MG/3ML
0.63 SOLUTION RESPIRATORY (INHALATION)
Status: DISCONTINUED | OUTPATIENT
Start: 2024-01-20 | End: 2024-01-20 | Stop reason: HOSPADM

## 2024-01-20 RX ORDER — METOPROLOL SUCCINATE 50 MG/1
50 TABLET, EXTENDED RELEASE ORAL DAILY
Status: DISCONTINUED | OUTPATIENT
Start: 2024-01-20 | End: 2024-01-20 | Stop reason: HOSPADM

## 2024-01-20 RX ORDER — METOPROLOL SUCCINATE 25 MG/1
25 TABLET, EXTENDED RELEASE ORAL DAILY
Status: DISCONTINUED | OUTPATIENT
Start: 2024-01-20 | End: 2024-01-20

## 2024-01-20 RX ORDER — DILTIAZEM HYDROCHLORIDE 240 MG/1
240 CAPSULE, COATED, EXTENDED RELEASE ORAL DAILY
Status: DISCONTINUED | OUTPATIENT
Start: 2024-01-20 | End: 2024-01-20

## 2024-01-20 RX ORDER — METOPROLOL TARTRATE 50 MG/1
50 TABLET ORAL 2 TIMES DAILY
Status: DISCONTINUED | OUTPATIENT
Start: 2024-01-20 | End: 2024-01-20

## 2024-01-20 RX ADMIN — MUPIROCIN: 20 OINTMENT TOPICAL at 09:01

## 2024-01-20 RX ADMIN — PANTOPRAZOLE SODIUM 40 MG: 40 TABLET, DELAYED RELEASE ORAL at 09:01

## 2024-01-20 RX ADMIN — LEVOTHYROXINE SODIUM 50 MCG: 50 TABLET ORAL at 05:01

## 2024-01-20 RX ADMIN — HYDROXYZINE PAMOATE 25 MG: 25 CAPSULE ORAL at 09:01

## 2024-01-20 RX ADMIN — DILTIAZEM HYDROCHLORIDE 10 MG/HR: 5 INJECTION INTRAVENOUS at 02:01

## 2024-01-20 RX ADMIN — FLUTICASONE FUROATE AND VILANTEROL TRIFENATATE 1 PUFF: 200; 25 POWDER RESPIRATORY (INHALATION) at 09:01

## 2024-01-20 RX ADMIN — Medication 1 CAPSULE: at 09:01

## 2024-01-20 RX ADMIN — APIXABAN 5 MG: 5 TABLET, FILM COATED ORAL at 09:01

## 2024-01-20 RX ADMIN — LEVALBUTEROL HYDROCHLORIDE 0.63 MG: 0.63 SOLUTION RESPIRATORY (INHALATION) at 08:01

## 2024-01-20 RX ADMIN — METOPROLOL SUCCINATE 50 MG: 50 TABLET, EXTENDED RELEASE ORAL at 09:01

## 2024-01-20 NOTE — PLAN OF CARE
Spoke to Amina at NH they will provide transportation sent clinicals and orders via Fax to facility. Also sent in [facility]

## 2024-01-20 NOTE — PLAN OF CARE
Problem: Adult Inpatient Plan of Care  Goal: Plan of Care Review  Outcome: Ongoing, Progressing  Goal: Patient-Specific Goal (Individualized)  Outcome: Ongoing, Progressing  Goal: Absence of Hospital-Acquired Illness or Injury  Outcome: Ongoing, Progressing  Goal: Optimal Comfort and Wellbeing  Outcome: Ongoing, Progressing     Problem: Skin Injury Risk Increased  Goal: Skin Health and Integrity  Outcome: Ongoing, Progressing     Problem: Infection  Goal: Absence of Infection Signs and Symptoms  Outcome: Ongoing, Progressing

## 2024-01-20 NOTE — AI DETERIORATION ALERT
Artificial Intelligence Notification  [facility]  [address]  [place] [address]  Phone: [phone]    This documentation was triggered by an Artificial Intelligence Notification:    Admit Date: 2024   LOS: 2  Code Status: Full Code  : 1945  Age: 79 y.o.  Weight:   Wt Readings from Last 1 Encounters:   24 124 kg (273 lb 5.9 oz)        Sex: female  Bed: 405/405 A  MRN: [id number]  Attending Physician: Chhaya Singh DO     Date of Alert: 2024  Time AI Alert Received:             Vitals:    24   BP:    Pulse: 87   Resp: 20   Temp:      SpO2: 96 %      Artificial Intelligence alert discussed with Provider:     Name: conner jerome   Date/Time of Provider Notification:       Patient Condition: BP rechecked and now in normal range. Chronic hypotension on midodrine. No interventions needed.

## 2024-01-20 NOTE — PLAN OF CARE
Problem: Adult Inpatient Plan of Care  Goal: Plan of Care Review  Outcome: Met  Goal: Patient-Specific Goal (Individualized)  Outcome: Met  Goal: Absence of Hospital-Acquired Illness or Injury  Outcome: Met  Goal: Optimal Comfort and Wellbeing  Outcome: Met  Goal: Readiness for Transition of Care  Outcome: Met     Problem: Bariatric Environmental Safety  Goal: Safety Maintained with Care  Outcome: Met     Problem: Device-Related Complication Risk (Hemodialysis)  Goal: Safe, Effective Therapy Delivery  Outcome: Met     Problem: Hemodynamic Instability (Hemodialysis)  Goal: Effective Tissue Perfusion  Outcome: Met     Problem: Infection (Hemodialysis)  Goal: Absence of Infection Signs and Symptoms  Outcome: Met     Problem: Fall Injury Risk  Goal: Absence of Fall and Fall-Related Injury  Outcome: Met     Problem: Skin Injury Risk Increased  Goal: Skin Health and Integrity  Outcome: Met     Problem: Infection  Goal: Absence of Infection Signs and Symptoms  Outcome: Met

## 2024-01-25 ENCOUNTER — PATIENT OUTREACH (OUTPATIENT)
Dept: ADMINISTRATIVE | Facility: CLINIC | Age: 79
End: 2024-01-25
Payer: MEDICARE

## 2024-02-05 NOTE — DISCHARGE SUMMARY
[facility] Medicine Discharge Summary    Admit Date: 1/17/2024  Discharge Date 01/20/2024  Admitting Physician:  Team  Discharging Physician: Chhaya Singh DO.  Primary Care Physician: Ludin Velasco MD    Discharge Diagnoses:  Volume overload secondary to ESRD on HD and congestive failure exacerbation  Atrial fibrillation with rapid ventricular rate, on Eliquis   Influenza A  Anemia chronic disease, stable   Hyperkalemia  ESRD on HD MWF  Elevated alkaline phosphatase   NSTEMI type 2 likely secondary to volume overload  hypertension,   hyperlipidemia   hypothyroidism diabetes mellitus type 2   anxiety/depression   Parkinson's disease      Above present on admission     Hospital Course:   Chief Complaint: SOB     HPI: (personally reviewed by me and is documented from initial H&P)    79 y.o. White female with a past medical history of hypertension, hyperlipidemia, paroxysmal atrial fibrillation on Eliquis, congestive heart failure (> 50 % LVEF in November 2023), ESRD on HD MWF, anemia chronic disease, hypothyroidism, diabetes mellitus type 2, anxiety/depression, Parkinson's disease and on CPAP all the time.      The patient presented to [facility] on 1/17/2024 with a primary complaint of shortness of breath for the last since yesterday (01/16/2024).  Patient reports having an intermittent cough with sputum production which is orange.  She denies complaints of abdominal pain, nausea, vomiting, diarrhea and chest pain.  Her last full session of dialysis was on 01/15/2024.     Upon EMS arrival patient's heart rate was 190. In route she received albuterol x3, Atrovent x1, 125 mg of Solu-Medrol, 5 mg of metoprolol and 250 mL of normal saline. Upon presentation to the ED, temperature 97.5° F, heart rate 140, blood pressure 107/44, respiratory rate 22 and SpO2 94% on 8 L. Labs WBC 9.97, H&H 10.9/34.7, .6, potassium 5.4, BUN/creatinine 43/6.58, glucose 117, alkaline  "phosphatase 251, BNP 1040.5, troponin 0.047.  Influenza A positive.  Influenza B and SARS-CoV-2 PCR negative.  EKG atrial fibrillation with rapid ventricular rate of 136.  In ED patient received albuterol nebulizer, 40 mg of IV Lasix, Tamiflu, 2 g of magnesium sulfate and 25 mg of Cardizem.  Patient was admitted to hospital medicine services for further medical management.     Interval Hx:   Patient was not given some of her medications on yesterday, including Lovenox reason being "patient unavailable"; today's nurse is notified and instructed to be certain patient receives all scheduled medications.     Patient complains that she has not ate in several days however on yesterday, patient was eating shrimp stew with vegetables during my encounter with her.   She is upset that she is not being discharged.   No overnight events.  No new complaints.    Patient eager to discharge back to nursing care facility.      Nursing home has accepted patient, she will discharge appropriately.  Patient to continue dialysis in outpatient setting and AFib medications have been prescribed appropriately.      Hospital course and discharge care plan has been discussed with patient, patient voices understanding and agreement with plan. All questions have been answered to the best of my ability. Patient is advised to return to ED or call 911 in case of emergency and or if symptoms worsen.        Vitals:  VITAL SIGNS: 24 HRS MIN & MAX LAST   No data recorded 98.1 °F (36.7 °C)   No data recorded (!) 111/57   No data recorded  86   No data recorded 18   No data recorded (!) 92 %       Physical Exam:    General: Appears comfortable, no acute distress.  Integumentary: Warm, dry, intact.  Musculoskeletal: Purposeful movement noted.   Respiratory: No accessory muscle use. Breath sounds are equal.  Cardiovascular: Regular rate. No peripheral edema.      Procedures Performed: No admission procedures for hospital encounter.     Significant " "Diagnostic Studies: See Full reports for all details    No results for input(s): "WBC", "RBC", "HGB", "HCT", "MCV", "MCH", "MCHC", "RDW", "PLT", "MPV", "GRAN", "LYMPH", "MONO", "BASO", "NRBC" in the last 168 hours.    No results for input(s): "NA", "K", "CL", "CO2", "ANIONGAP", "BUN", "CREATININE", "GLU", "CALCIUM", "PH", "MG", "ALBUMIN", "PROT", "ALKPHOS", "ALT", "AST", "BILITOT" in the last 168 hours.     Microbiology Results (last 7 days)       ** No results found for the last 168 hours. **             X-Ray Chest AP Portable  Narrative: EXAMINATION:  XR CHEST AP PORTABLE    CLINICAL HISTORY:  Shortness of breath    TECHNIQUE:  Single frontal view of the chest was performed.    COMPARISON:  09/23/2023    FINDINGS:  There is pulmonary edema and pulmonary venous congestion bilaterally.  There is no pleural effusion..  The heart is enlarged in size.  Aorta is unremarkable.  The bones and joints show no acute abnormality.  Impression: Increased volume status/CHF    Electronically signed by: Fuad Ramos  Date:    01/17/2024  Time:    10:06         Medication List        CHANGE how you take these medications      apixaban 5 mg Tab  Commonly known as: ELIQUIS  Take 1 tablet (5 mg total) by mouth 2 (two) times daily.  What changed:   medication strength  how much to take     metoprolol succinate 50 MG 24 hr tablet  Commonly known as: TOPROL-XL  Take 1 tablet (50 mg total) by mouth once daily.  What changed:   medication strength  how much to take  when to take this  additional instructions            CONTINUE taking these medications      acetaminophen 500 MG tablet  Commonly known as: TYLENOL  Take 1 tablet (500 mg total) by mouth every 6 (six) hours as needed.     albuterol-ipratropium 2.5 mg-0.5 mg/3 mL nebulizer solution  Commonly known as: DUO-NEB  Take 3 mLs by nebulization every 6 (six) hours while awake. Rescue     * ANUSOL-HC 25 mg suppository  Generic drug: hydrocortisone     * hydrocortisone 25 mg " suppository  Commonly known as: ANUSOL-HC     atorvastatin 40 MG tablet  Commonly known as: LIPITOR     bacitracin 500 unit/gram ointment     BIOTENE DRY MOUTH MM     BIOTENE DRY MOUTH ORAL RINSE MM     budesonide 0.5 mg/2 mL nebulizer solution  Commonly known as: PULMICORT  Take 2 mLs (0.5 mg total) by nebulization every 12 (twelve) hours. Controller     fluticasone propionate 50 mcg/actuation nasal spray  Commonly known as: FLONASE     fluticasone-salmeterol 250-50 mcg/dose 250-50 mcg/dose diskus inhaler  Commonly known as: ADVAIR     furosemide 40 MG tablet  Commonly known as: LASIX     gabapentin 300 MG capsule  Commonly known as: NEURONTIN     glycerin adult suppository     HumuLIN 70/30 U-100 Insulin 100 unit/mL (70-30) injection  Generic drug: insulin NPH-insulin regular (70/30)     * insulin regular 100 unit/mL injection     * insulin regular human 100 unit/mL (3 mL) Inpn pen  Commonly known as: HumuLIN R     lactobacillus acidophilus & bulgar 100 million cell packet  Commonly known as: LACTINEX     Lactobacillus acidophilus 500 million cell Cap     * levothyroxine 200 MCG tablet  Commonly known as: SYNTHROID     * levothyroxine 50 MCG tablet  Commonly known as: SYNTHROID     loratadine 10 mg tablet  Commonly known as: CLARITIN     midodrine 5 MG Tab  Commonly known as: PROAMATINE     multivitamin with folic acid 400 mcg Tab     nystatin 100,000 unit/mL suspension  Commonly known as: MYCOSTATIN     ondansetron 4 MG tablet  Commonly known as: ZOFRAN     pantoprazole 40 MG tablet  Commonly known as: PROTONIX     potassium chloride 10% 20 mEq/15 mL oral solution  Commonly known as: KAYCIEL     PREVIDENT 5000 PLUS 1.1 % Crea  Generic drug: fluoride (sodium)     REPLENS VAGL     traMADoL 50 mg tablet  Commonly known as: ULTRAM     VISTARIL ORAL     zaleplon 10 MG capsule  Commonly known as: SONATA           * This list has 6 medication(s) that are the same as other medications prescribed for you. Read the  directions carefully, and ask your doctor or other care provider to review them with you.                STOP taking these medications      diazePAM 5 MG tablet  Commonly known as: VALIUM     meloxicam 7.5 MG tablet  Commonly known as: MOBIC     metOLazone 5 MG tablet  Commonly known as: ZAROXOLYN     torsemide 20 MG Tab  Commonly known as: DEMADEX            ASK your doctor about these medications      oseltamivir 30 MG capsule  Commonly known as: TAMIFLU  Take 1 capsule (30 mg total) by mouth every 48 hours. for 2 days  Ask about: Should I take this medication?               Where to Get Your Medications        Information about where to get these medications is not yet available    Ask your nurse or doctor about these medications  apixaban 5 mg Tab  metoprolol succinate 50 MG 24 hr tablet  oseltamivir 30 MG capsule          Explained in detail to the patient about the discharge plan, medications, and follow-up visits. Pt understands and agrees with the treatment plan  Discharge Disposition: Home or Self Care   Discharged Condition: stable  Diet-    Medications Per DC med rec  Activities as tolerated    For further questions contact hospitalist office    Discharge time 33 minutes    For worsening symptoms, chest pain, shortness of breath, increased abdominal pain, high grade fever, stroke or stroke like symptoms, immediately go to the nearest Emergency Room or call 911 as soon as possible.      Chhaya Xavier M.D, on 2/5/2024. at 3:03 PM.